# Patient Record
Sex: MALE | Race: WHITE | Employment: FULL TIME | URBAN - METROPOLITAN AREA
[De-identification: names, ages, dates, MRNs, and addresses within clinical notes are randomized per-mention and may not be internally consistent; named-entity substitution may affect disease eponyms.]

---

## 2017-01-03 RX ORDER — LORATADINE AND PSEUDOEPHEDRINE 10; 240 MG/1; MG/1
1 TABLET, EXTENDED RELEASE ORAL DAILY
Qty: 90 TABLET | Refills: 1 | Status: SHIPPED | OUTPATIENT
Start: 2017-01-03 | End: 2017-07-06 | Stop reason: SDUPTHER

## 2017-03-10 ENCOUNTER — OFFICE VISIT (OUTPATIENT)
Dept: FAMILY MEDICINE CLINIC | Age: 62
End: 2017-03-10

## 2017-03-10 VITALS
TEMPERATURE: 100.5 F | OXYGEN SATURATION: 99 % | WEIGHT: 181.25 LBS | BODY MASS INDEX: 29.13 KG/M2 | SYSTOLIC BLOOD PRESSURE: 131 MMHG | DIASTOLIC BLOOD PRESSURE: 79 MMHG | HEART RATE: 102 BPM

## 2017-03-10 DIAGNOSIS — R34 SCANTY URINE: ICD-10-CM

## 2017-03-10 DIAGNOSIS — N30.00 ACUTE CYSTITIS WITHOUT HEMATURIA: Primary | ICD-10-CM

## 2017-03-10 LAB
BILIRUBIN, POC: NEGATIVE
BLOOD URINE, POC: ABNORMAL
CLARITY, POC: ABNORMAL
COLOR, POC: YELLOW
GLUCOSE URINE, POC: NEGATIVE
KETONES, POC: NEGATIVE
LEUKOCYTE EST, POC: ABNORMAL
NITRITE, POC: POSITIVE
PH, POC: >=9
PROTEIN, POC: ABNORMAL
SPECIFIC GRAVITY, POC: 1.01
UROBILINOGEN, POC: 0.2

## 2017-03-10 PROCEDURE — 99213 OFFICE O/P EST LOW 20 MIN: CPT | Performed by: FAMILY MEDICINE

## 2017-03-10 PROCEDURE — 81003 URINALYSIS AUTO W/O SCOPE: CPT | Performed by: FAMILY MEDICINE

## 2017-03-10 RX ORDER — SULFAMETHOXAZOLE AND TRIMETHOPRIM 800; 160 MG/1; MG/1
1 TABLET ORAL 2 TIMES DAILY
Qty: 14 TABLET | Refills: 0 | Status: SHIPPED | OUTPATIENT
Start: 2017-03-10 | End: 2017-03-13 | Stop reason: CLARIF

## 2017-03-12 LAB
ORGANISM: ABNORMAL
URINE CULTURE, ROUTINE: ABNORMAL

## 2017-03-13 RX ORDER — CIPROFLOXACIN 250 MG/1
250 TABLET, FILM COATED ORAL 2 TIMES DAILY
Qty: 14 TABLET | Refills: 0 | Status: SHIPPED | OUTPATIENT
Start: 2017-03-13 | End: 2017-03-16 | Stop reason: ALTCHOICE

## 2017-03-16 ENCOUNTER — TELEPHONE (OUTPATIENT)
Dept: FAMILY MEDICINE CLINIC | Age: 62
End: 2017-03-16

## 2017-03-16 RX ORDER — CIPROFLOXACIN 500 MG/1
500 TABLET, FILM COATED ORAL 2 TIMES DAILY
Qty: 20 TABLET | Refills: 0 | Status: SHIPPED | OUTPATIENT
Start: 2017-03-16 | End: 2017-03-26

## 2017-03-30 ENCOUNTER — HOSPITAL ENCOUNTER (OUTPATIENT)
Dept: OTHER | Age: 62
Discharge: HOME | End: 2017-03-30
Attending: UROLOGY | Admitting: UROLOGY

## 2017-03-30 LAB — PROSTATE SPECIFIC ANTIGEN: <0.01 NG/ML (ref 0–4)

## 2017-04-28 ENCOUNTER — OFFICE VISIT (OUTPATIENT)
Dept: FAMILY MEDICINE CLINIC | Age: 62
End: 2017-04-28

## 2017-04-28 VITALS
HEART RATE: 88 BPM | TEMPERATURE: 97.9 F | WEIGHT: 186 LBS | HEIGHT: 66 IN | DIASTOLIC BLOOD PRESSURE: 80 MMHG | BODY MASS INDEX: 29.89 KG/M2 | OXYGEN SATURATION: 98 % | SYSTOLIC BLOOD PRESSURE: 139 MMHG

## 2017-04-28 DIAGNOSIS — R41.3 MEMORY DISTURBANCE: ICD-10-CM

## 2017-04-28 DIAGNOSIS — C61 PROSTATE CANCER (HCC): Primary | ICD-10-CM

## 2017-04-28 DIAGNOSIS — M19.90 ARTHRITIS: ICD-10-CM

## 2017-04-28 DIAGNOSIS — Z79.818 USE OF LEUPROLIDE ACETATE (LUPRON): ICD-10-CM

## 2017-04-28 DIAGNOSIS — L57.0 AK (ACTINIC KERATOSIS): ICD-10-CM

## 2017-04-28 DIAGNOSIS — Z87.828 HISTORY OF MENISCAL TEAR: ICD-10-CM

## 2017-04-28 PROBLEM — Z82.49 FAMILY HISTORY OF VALVULAR HEART DISEASE: Status: ACTIVE | Noted: 2017-04-28

## 2017-04-28 PROCEDURE — 99214 OFFICE O/P EST MOD 30 MIN: CPT | Performed by: FAMILY MEDICINE

## 2017-04-28 RX ORDER — DONEPEZIL HYDROCHLORIDE 5 MG/1
5 TABLET, FILM COATED ORAL NIGHTLY
Qty: 90 TABLET | Refills: 3 | Status: SHIPPED | OUTPATIENT
Start: 2017-04-28 | End: 2018-04-02 | Stop reason: SDUPTHER

## 2017-04-28 RX ORDER — MELOXICAM 15 MG/1
15 TABLET ORAL DAILY
Qty: 90 TABLET | Refills: 3 | Status: SHIPPED | OUTPATIENT
Start: 2017-04-28 | End: 2018-04-02 | Stop reason: SDUPTHER

## 2017-05-18 ENCOUNTER — OFFICE VISIT (OUTPATIENT)
Dept: FAMILY MEDICINE CLINIC | Age: 62
End: 2017-05-18

## 2017-05-18 VITALS
BODY MASS INDEX: 29.89 KG/M2 | SYSTOLIC BLOOD PRESSURE: 124 MMHG | WEIGHT: 186 LBS | TEMPERATURE: 98.3 F | OXYGEN SATURATION: 98 % | HEART RATE: 101 BPM | HEIGHT: 66 IN | DIASTOLIC BLOOD PRESSURE: 76 MMHG

## 2017-05-18 DIAGNOSIS — R39.15 URGENCY OF URINATION: Primary | ICD-10-CM

## 2017-05-18 LAB
BILIRUBIN, POC: NORMAL
BLOOD URINE, POC: NORMAL
CLARITY, POC: NORMAL
COLOR, POC: YELLOW
GLUCOSE URINE, POC: NORMAL
KETONES, POC: NORMAL
LEUKOCYTE EST, POC: NORMAL
NITRITE, POC: NORMAL
PH, POC: 7
PROTEIN, POC: 30
SPECIFIC GRAVITY, POC: 1.01
UROBILINOGEN, POC: 0.2

## 2017-05-18 PROCEDURE — 99213 OFFICE O/P EST LOW 20 MIN: CPT | Performed by: NURSE PRACTITIONER

## 2017-05-18 PROCEDURE — 81002 URINALYSIS NONAUTO W/O SCOPE: CPT | Performed by: NURSE PRACTITIONER

## 2017-05-18 RX ORDER — CIPROFLOXACIN 500 MG/1
500 TABLET, FILM COATED ORAL 2 TIMES DAILY
Qty: 20 TABLET | Refills: 0 | Status: SHIPPED | OUTPATIENT
Start: 2017-05-18 | End: 2017-05-28

## 2017-05-18 ASSESSMENT — ENCOUNTER SYMPTOMS
EYES NEGATIVE: 1
GASTROINTESTINAL NEGATIVE: 1
RESPIRATORY NEGATIVE: 1
BACK PAIN: 1

## 2017-05-20 LAB
ORGANISM: ABNORMAL
URINE CULTURE, ROUTINE: ABNORMAL

## 2017-05-22 ENCOUNTER — EMPLOYEE WELLNESS (OUTPATIENT)
Dept: OTHER | Age: 62
End: 2017-05-22

## 2017-05-22 LAB
CHOLESTEROL, TOTAL: 178 MG/DL (ref 0–199)
GLUCOSE BLD-MCNC: 100 MG/DL (ref 70–99)
HDLC SERPL-MCNC: 12 MG/DL (ref 40–60)
LDL CHOLESTEROL CALCULATED: 136 MG/DL
TRIGL SERPL-MCNC: 150 MG/DL (ref 0–150)

## 2017-07-06 ENCOUNTER — OFFICE VISIT (OUTPATIENT)
Dept: FAMILY MEDICINE CLINIC | Age: 62
End: 2017-07-06

## 2017-07-06 VITALS
HEART RATE: 76 BPM | DIASTOLIC BLOOD PRESSURE: 82 MMHG | HEIGHT: 66 IN | BODY MASS INDEX: 28.22 KG/M2 | WEIGHT: 175.6 LBS | SYSTOLIC BLOOD PRESSURE: 136 MMHG | OXYGEN SATURATION: 98 %

## 2017-07-06 DIAGNOSIS — C61 PROSTATE CANCER (HCC): Primary | ICD-10-CM

## 2017-07-06 DIAGNOSIS — E78.89 LIPIDS ABNORMAL: ICD-10-CM

## 2017-07-06 DIAGNOSIS — M19.90 ARTHRITIS: ICD-10-CM

## 2017-07-06 DIAGNOSIS — R41.3 MEMORY DISTURBANCE: ICD-10-CM

## 2017-07-06 DIAGNOSIS — Z11.59 NEED FOR HEPATITIS C SCREENING TEST: ICD-10-CM

## 2017-07-06 DIAGNOSIS — J30.1 SEASONAL ALLERGIC RHINITIS DUE TO POLLEN: ICD-10-CM

## 2017-07-06 PROCEDURE — 99214 OFFICE O/P EST MOD 30 MIN: CPT | Performed by: FAMILY MEDICINE

## 2017-07-06 RX ORDER — LORATADINE AND PSEUDOEPHEDRINE 10; 240 MG/1; MG/1
1 TABLET, EXTENDED RELEASE ORAL DAILY
Qty: 90 TABLET | Refills: 3 | Status: SHIPPED | OUTPATIENT
Start: 2017-07-06 | End: 2018-07-05 | Stop reason: SDUPTHER

## 2017-07-06 ASSESSMENT — ENCOUNTER SYMPTOMS
CONSTIPATION: 0
DIARRHEA: 0
BLOOD IN STOOL: 0
SHORTNESS OF BREATH: 0

## 2017-08-03 ENCOUNTER — HOSPITAL ENCOUNTER (OUTPATIENT)
Dept: OTHER | Age: 62
Discharge: HOME | End: 2017-08-03
Attending: FAMILY MEDICINE | Admitting: FAMILY MEDICINE

## 2017-08-03 ENCOUNTER — HOSPITAL ENCOUNTER (OUTPATIENT)
Dept: OTHER | Age: 62
Discharge: HOME | End: 2017-08-03
Attending: UROLOGY | Admitting: UROLOGY

## 2017-08-03 LAB
A/G RATIO: 1.4 (ref 1.1–2.2)
ALBUMIN SERPL-MCNC: 4 G/DL (ref 3.4–5)
ALP BLD-CCNC: 98 U/L (ref 40–129)
ALT SERPL-CCNC: 20 U/L (ref 10–40)
ANION GAP SERPL CALCULATED.3IONS-SCNC: 10 MMOL/L (ref 3–16)
AST SERPL-CCNC: 23 U/L (ref 15–37)
BILIRUB SERPL-MCNC: 0.4 MG/DL (ref 0–1)
BUN BLDV-MCNC: 19 MG/DL (ref 7–20)
CALCIUM SERPL-MCNC: 10 MG/DL (ref 8.3–10.6)
CHLORIDE BLD-SCNC: 105 MMOL/L (ref 99–110)
CHOLESTEROL, FASTING: 189 MG/DL (ref 0–199)
CO2: 26 MMOL/L (ref 21–32)
CREAT SERPL-MCNC: 0.8 MG/DL (ref 0.8–1.3)
GFR AFRICAN AMERICAN: >60
GFR NON-AFRICAN AMERICAN: >60
GLOBULIN: 2.8 G/DL
GLUCOSE FASTING: 118 MG/DL (ref 70–99)
HDLC SERPL-MCNC: 67 MG/DL (ref 40–60)
HEPATITIS C ANTIBODY INTERPRETATION: NORMAL
LDL CHOLESTEROL CALCULATED: 103 MG/DL
POTASSIUM SERPL-SCNC: 4.9 MMOL/L (ref 3.5–5.1)
PROSTATE SPECIFIC ANTIGEN: <0.01 NG/ML (ref 0–4)
SODIUM BLD-SCNC: 141 MMOL/L (ref 136–145)
TOTAL PROTEIN: 6.8 G/DL (ref 6.4–8.2)
TRIGLYCERIDE, FASTING: 94 MG/DL (ref 0–150)
VLDLC SERPL CALC-MCNC: 19 MG/DL

## 2017-11-25 ENCOUNTER — HOSPITAL ENCOUNTER (OUTPATIENT)
Dept: OTHER | Age: 62
Discharge: HOME | End: 2017-11-25
Attending: UROLOGY | Admitting: UROLOGY

## 2017-11-26 LAB — PROSTATE SPECIFIC ANTIGEN: <0.01 NG/ML (ref 0–4)

## 2017-11-28 ENCOUNTER — HOSPITAL ENCOUNTER (OUTPATIENT)
Dept: GENERAL RADIOLOGY | Age: 62
Discharge: HOME | End: 2017-11-28
Attending: UROLOGY | Admitting: UROLOGY

## 2017-11-28 DIAGNOSIS — C61 MALIGNANT NEOPLASM OF PROSTATE (HCC): ICD-10-CM

## 2017-11-28 DIAGNOSIS — C61 PROSTATE CANCER (HCC): ICD-10-CM

## 2018-01-15 ENCOUNTER — OFFICE VISIT (OUTPATIENT)
Dept: DERMATOLOGY | Age: 63
End: 2018-01-15

## 2018-01-15 DIAGNOSIS — D48.5 NEOPLASM OF UNCERTAIN BEHAVIOR OF SKIN: ICD-10-CM

## 2018-01-15 DIAGNOSIS — Z12.83 SCREENING EXAM FOR SKIN CANCER: ICD-10-CM

## 2018-01-15 DIAGNOSIS — L57.0 ACTINIC KERATOSES: Primary | ICD-10-CM

## 2018-01-15 PROCEDURE — 99213 OFFICE O/P EST LOW 20 MIN: CPT | Performed by: DERMATOLOGY

## 2018-01-15 PROCEDURE — 17000 DESTRUCT PREMALG LESION: CPT | Performed by: DERMATOLOGY

## 2018-01-15 PROCEDURE — 11100 PR BIOPSY OF SKIN LESION: CPT | Performed by: DERMATOLOGY

## 2018-01-15 PROCEDURE — 17003 DESTRUCT PREMALG LES 2-14: CPT | Performed by: DERMATOLOGY

## 2018-01-15 NOTE — PROGRESS NOTES
3    meloxicam (MOBIC) 15 MG tablet Take 1 tablet by mouth daily 90 tablet 3    fluticasone (FLONASE) 50 MCG/ACT nasal spray 2 sprays bilaterally qd 3 Bottle 3    Calcium Carb-Cholecalciferol 600-500 MG-UNIT CAPS Take 1,000 Units by mouth daily      Ascorbic Acid (VITAMIN C) 500 MG tablet Take 1,000 mg by mouth daily      leuprolide (LUPRON) 11.25 MG injection Inject 11.25 mg into the muscle once      acetaminophen (TYLENOL) 500 MG tablet Take 1 tablet by mouth every 6 hours as needed for Pain 120 tablet      Social History: Jazmin Quiroga. Wife is RN at [employer]. Physical Examination     The following were examined and determined to be normal: Psych/Neuro, Scalp/hair, Conjunctivae/eyelids, Gums/teeth/lips, Neck, Breast/axilla/chest, Abdomen, Back, RUE, LUE, LLE, Nails/digits and Genitalia/groin/buttocks. The following were examined and determined to be abnormal: Head/face and RLE. -General: Well-appearing, NAD  1. L lateral cheek 2 - ill-defined irregularly-shaped roughly-scaling thin pink macules/papules   2. R mid anterior lower leg - 1.3cm focally eroded dark pink patch       Assessment and Plan     1. Actinic keratosis(es)  -Edu re: relationship with chronic cumulative sun exposure, low premalignant potential.   -2 lesion(s) treated w/ liquid nitrogen x 2 cycles - L cheek. Edu re: risk of blister formation, discomfort, scar, hypopigmentation. Discussed wound care. -Reviewed sun protective behavior -- sun avoidance during the peak hours of the day, sun-protective clothing (including hat and sunglasses), sunscreen use (water resistant, broad spectrum, SPF at least 30, need for reapplication every 2 to 3 hours). -Return for full skin exam in 1 year (sooner if indicated)         2. Neoplasm of uncertain behavior of skin - R/o BCC, R lower leg  -Discussed possible diagnosis. Patient agreeable to biopsy.  Verbal consent obtained after risks (infection, bleeding, scar), benefits and alternatives explained. -Area(s) to be biopsied were marked with a surgical pen. Site(s) were cleansed with alcohol. Local anesthesia achieved with 1% lidocaine with epinephrine/sodium bicarbonate. Shave biopsy performed with a razor blade. Hemostasis was achieved with aluminum chloride. The wound(s) were dressed with petrolatum and covered with a bandage. Specimen(s) sent to pathology. Pt educated re: risk of bleeding, infection, scar and wound care instructions.

## 2018-01-19 ENCOUNTER — TELEPHONE (OUTPATIENT)
Dept: DERMATOLOGY | Age: 63
End: 2018-01-19

## 2018-02-22 ENCOUNTER — PROCEDURE VISIT (OUTPATIENT)
Dept: DERMATOLOGY | Age: 63
End: 2018-02-22

## 2018-02-22 DIAGNOSIS — L57.0 ACTINIC KERATOSES: Primary | ICD-10-CM

## 2018-02-22 PROCEDURE — 17000 DESTRUCT PREMALG LESION: CPT | Performed by: DERMATOLOGY

## 2018-02-22 NOTE — PROGRESS NOTES
[facility]) Dermatology  Daphne Kaur MD  [phone]      Vamsi Gardner  1955    57 y.o. male     Date of Visit: 2/22/2018    Last Visit: 1mo    Chief Complaint: AK    History of Present Illness:  1. Here for treatment of biopsy-confirmed AK of R mid anterior lower leg. No concerns since biopsy. Review of Systems: None    Past Medical History, Surgical History, Medications and Allergies reviewed.      Past Medical History:   Diagnosis Date    Actinic keratoses     Allergic rhinitis     Arthritis     Environmental allergies     Prostate cancer ([facility] [address]     PROSTATE     Past Surgical History:   Procedure Laterality Date    COLONOSCOPY  2-16-16    KNEE ARTHROSCOPY Left 4/26/16    partial medial and lateral meniscectomy, chondroplasty    KNEE SURGERY Right 10/15/13    RT KNEE PARTIAL MEDIAL, LATERAL MENISECTOMY AND CHONDROPLASTY WITH REMOVAL OF LOSSE HARDWARE    KNEE SURGERY Right 10/15/13    RT KNEE ARTHROSCOPY PARTIAL LATERAL AND MEDIAL MENISECTOMY WITH CHONDROPLASTY AND REMOVAL OF LOOSE BODIES    PROSTATECTOMY  07/17/15    DaVinci prostatectomy    SHOULDER SURGERY         Allergies   Allergen Reactions    Cephalexin Hives     Outpatient Prescriptions Marked as Taking for the 2/22/18 encounter (Procedure visit) with Daphne Kaur MD   Medication Sig Dispense Refill    vitamin D 1000 units CAPS Take by mouth      aspirin 81 MG tablet Take 81 mg by mouth daily      Sennosides (SENNA LAXATIVE PO) Take by mouth      MAGNESIUM PO Take by mouth      loratadine-pseudoephedrine (CLARITIN-D 24 HOUR)  MG per extended release tablet Take 1 tablet by mouth daily 90 tablet 3    donepezil (ARICEPT) 5 MG tablet Take 1 tablet by mouth nightly 90 tablet 3    meloxicam (MOBIC) 15 MG tablet Take 1 tablet by mouth daily 90 tablet 3    fluticasone (FLONASE) 50 MCG/ACT nasal spray 2 sprays bilaterally qd 3 Bottle 3    Calcium Carb-Cholecalciferol 600-500 MG-UNIT CAPS Take 1,000 Units by mouth daily      Ascorbic Acid (VITAMIN C) 500 MG tablet Take 1,000 mg by mouth daily      leuprolide (LUPRON) 11.25 MG injection Inject 11.25 mg into the muscle once      acetaminophen (TYLENOL) 500 MG tablet Take 1 tablet by mouth every 6 hours as needed for Pain 120 tablet        Physical Examination     The following were examined and determined to be normal: Psych/Neuro. The following were examined and determined to be abnormal: RLE.     -General: Well-appearing, NAD  1. R mid anterior lower leg 1 - irregularly-shaped roughly-scaling thin pink papule     Assessment and Plan     1. Actinic keratosis(es)  -Edu re: relationship with chronic cumulative sun exposure, low premalignant potential.   -1 lesion(s) treated w/ liquid nitrogen x 2 cycles - R lower leg. Edu re: risk of blister formation, discomfort, scar, hypopigmentation. Discussed wound care.

## 2018-02-26 ENCOUNTER — OFFICE VISIT (OUTPATIENT)
Dept: FAMILY MEDICINE CLINIC | Age: 63
End: 2018-02-26

## 2018-02-26 VITALS
OXYGEN SATURATION: 97 % | SYSTOLIC BLOOD PRESSURE: 140 MMHG | WEIGHT: 185.2 LBS | HEART RATE: 89 BPM | DIASTOLIC BLOOD PRESSURE: 82 MMHG | TEMPERATURE: 97.9 F | HEIGHT: 66 IN | BODY MASS INDEX: 29.77 KG/M2

## 2018-02-26 DIAGNOSIS — N39.0 URINARY TRACT INFECTION WITHOUT HEMATURIA, SITE UNSPECIFIED: Primary | ICD-10-CM

## 2018-02-26 DIAGNOSIS — L08.9 PUSTULE: ICD-10-CM

## 2018-02-26 DIAGNOSIS — R39.15 URINARY URGENCY: ICD-10-CM

## 2018-02-26 DIAGNOSIS — R50.9 FEVER, UNSPECIFIED FEVER CAUSE: ICD-10-CM

## 2018-02-26 LAB
BILIRUBIN, POC: NORMAL
BLOOD URINE, POC: NORMAL
CLARITY, POC: NORMAL
COLOR, POC: YELLOW
GLUCOSE URINE, POC: NORMAL
KETONES, POC: NORMAL
LEUKOCYTE EST, POC: NORMAL
NITRITE, POC: NORMAL
PH, POC: 6.5
PROTEIN, POC: 30
SPECIFIC GRAVITY, POC: 1.01
UROBILINOGEN, POC: 0.2

## 2018-02-26 PROCEDURE — 99214 OFFICE O/P EST MOD 30 MIN: CPT | Performed by: FAMILY MEDICINE

## 2018-02-26 PROCEDURE — 81002 URINALYSIS NONAUTO W/O SCOPE: CPT | Performed by: FAMILY MEDICINE

## 2018-02-26 RX ORDER — CIPROFLOXACIN 500 MG/1
500 TABLET, FILM COATED ORAL 2 TIMES DAILY
Qty: 20 TABLET | Refills: 0 | Status: SHIPPED | OUTPATIENT
Start: 2018-02-26 | End: 2018-03-08

## 2018-02-26 ASSESSMENT — PATIENT HEALTH QUESTIONNAIRE - PHQ9
2. FEELING DOWN, DEPRESSED OR HOPELESS: 0
SUM OF ALL RESPONSES TO PHQ9 QUESTIONS 1 & 2: 0
1. LITTLE INTEREST OR PLEASURE IN DOING THINGS: 0
SUM OF ALL RESPONSES TO PHQ QUESTIONS 1-9: 0

## 2018-02-26 ASSESSMENT — ENCOUNTER SYMPTOMS: ABDOMINAL PAIN: 0

## 2018-02-28 LAB
ORGANISM: ABNORMAL
URINE CULTURE, ROUTINE: ABNORMAL
URINE CULTURE, ROUTINE: ABNORMAL

## 2018-03-20 ENCOUNTER — TELEPHONE (OUTPATIENT)
Dept: FAMILY MEDICINE CLINIC | Age: 63
End: 2018-03-20

## 2018-03-20 VITALS — BODY MASS INDEX: 29.83 KG/M2 | WEIGHT: 182 LBS

## 2018-03-20 DIAGNOSIS — R73.09 ABNORMAL GLUCOSE: Primary | ICD-10-CM

## 2018-03-30 ENCOUNTER — HOSPITAL ENCOUNTER (OUTPATIENT)
Dept: OTHER | Age: 63
Discharge: HOME | End: 2018-03-30
Attending: UROLOGY | Admitting: UROLOGY

## 2018-03-31 LAB — PROSTATE SPECIFIC ANTIGEN: <0.01 NG/ML (ref 0–4)

## 2018-04-02 ENCOUNTER — OFFICE VISIT (OUTPATIENT)
Dept: FAMILY MEDICINE CLINIC | Age: 63
End: 2018-04-02

## 2018-04-02 VITALS
DIASTOLIC BLOOD PRESSURE: 86 MMHG | HEIGHT: 66 IN | WEIGHT: 184.4 LBS | SYSTOLIC BLOOD PRESSURE: 140 MMHG | HEART RATE: 74 BPM | OXYGEN SATURATION: 97 % | BODY MASS INDEX: 29.63 KG/M2

## 2018-04-02 DIAGNOSIS — Z00.00 WELL ADULT EXAM: Primary | ICD-10-CM

## 2018-04-02 DIAGNOSIS — R73.09 ABNORMAL GLUCOSE: ICD-10-CM

## 2018-04-02 DIAGNOSIS — J30.1 CHRONIC SEASONAL ALLERGIC RHINITIS DUE TO POLLEN: ICD-10-CM

## 2018-04-02 DIAGNOSIS — R41.3 MEMORY DISTURBANCE: ICD-10-CM

## 2018-04-02 DIAGNOSIS — C61 PROSTATE CANCER (HCC): ICD-10-CM

## 2018-04-02 DIAGNOSIS — M19.90 ARTHRITIS: ICD-10-CM

## 2018-04-02 LAB
A/G RATIO: 2.3 (ref 1.1–2.2)
ALBUMIN SERPL-MCNC: 4.5 G/DL (ref 3.4–5)
ALP BLD-CCNC: 94 U/L (ref 40–129)
ALT SERPL-CCNC: 17 U/L (ref 10–40)
ANION GAP SERPL CALCULATED.3IONS-SCNC: 13 MMOL/L (ref 3–16)
AST SERPL-CCNC: 20 U/L (ref 15–37)
BILIRUB SERPL-MCNC: 0.3 MG/DL (ref 0–1)
BUN BLDV-MCNC: 21 MG/DL (ref 7–20)
CALCIUM SERPL-MCNC: 9.6 MG/DL (ref 8.3–10.6)
CHLORIDE BLD-SCNC: 102 MMOL/L (ref 99–110)
CHOLESTEROL, TOTAL: 200 MG/DL (ref 0–199)
CO2: 27 MMOL/L (ref 21–32)
CREAT SERPL-MCNC: 0.8 MG/DL (ref 0.8–1.3)
GFR AFRICAN AMERICAN: >60
GFR NON-AFRICAN AMERICAN: >60
GLOBULIN: 2 G/DL
GLUCOSE BLD-MCNC: 117 MG/DL (ref 70–99)
HDLC SERPL-MCNC: 69 MG/DL (ref 40–60)
LDL CHOLESTEROL CALCULATED: 108 MG/DL
POTASSIUM SERPL-SCNC: 4.7 MMOL/L (ref 3.5–5.1)
SODIUM BLD-SCNC: 142 MMOL/L (ref 136–145)
TOTAL PROTEIN: 6.5 G/DL (ref 6.4–8.2)
TRIGL SERPL-MCNC: 116 MG/DL (ref 0–150)
VLDLC SERPL CALC-MCNC: 23 MG/DL

## 2018-04-02 PROCEDURE — 99396 PREV VISIT EST AGE 40-64: CPT | Performed by: FAMILY MEDICINE

## 2018-04-02 RX ORDER — DONEPEZIL HYDROCHLORIDE 5 MG/1
5 TABLET, FILM COATED ORAL NIGHTLY
Qty: 90 TABLET | Refills: 3 | Status: SHIPPED | OUTPATIENT
Start: 2018-04-02 | End: 2018-08-20 | Stop reason: SDUPTHER

## 2018-04-02 RX ORDER — FLUTICASONE PROPIONATE 50 MCG
SPRAY, SUSPENSION (ML) NASAL
Qty: 3 BOTTLE | Refills: 3 | Status: SHIPPED | OUTPATIENT
Start: 2018-04-02

## 2018-04-02 RX ORDER — MELOXICAM 15 MG/1
15 TABLET ORAL DAILY
Qty: 90 TABLET | Refills: 3 | Status: SHIPPED | OUTPATIENT
Start: 2018-04-02 | End: 2018-08-20 | Stop reason: SDUPTHER

## 2018-04-02 ASSESSMENT — ENCOUNTER SYMPTOMS
DIARRHEA: 0
SHORTNESS OF BREATH: 0
NAUSEA: 0
CONSTIPATION: 0
CHEST TIGHTNESS: 0

## 2018-04-03 ENCOUNTER — TELEPHONE (OUTPATIENT)
Dept: FAMILY MEDICINE CLINIC | Age: 63
End: 2018-04-03

## 2018-04-03 LAB
ESTIMATED AVERAGE GLUCOSE: 122.6 MG/DL
HBA1C MFR BLD: 5.9 %

## 2018-04-09 ENCOUNTER — PATIENT MESSAGE (OUTPATIENT)
Dept: FAMILY MEDICINE CLINIC | Age: 63
End: 2018-04-09

## 2018-04-09 DIAGNOSIS — Z86.73 PERSONAL HISTORY OF TIA (TRANSIENT ISCHEMIC ATTACK): Primary | ICD-10-CM

## 2018-04-19 ENCOUNTER — HOSPITAL ENCOUNTER (OUTPATIENT)
Dept: VASCULAR LAB | Age: 63
Discharge: HOME | End: 2018-04-19
Attending: FAMILY MEDICINE | Admitting: FAMILY MEDICINE

## 2018-04-19 DIAGNOSIS — Z86.73 PERSONAL HISTORY OF TRANSIENT ISCHEMIC ATTACK (TIA), AND CEREBRAL INFARCTION WITHOUT RESIDUAL DEFICITS: ICD-10-CM

## 2018-05-11 ENCOUNTER — HOSPITAL ENCOUNTER (OUTPATIENT)
Dept: NON INVASIVE DIAGNOSTICS | Age: 63
Discharge: HOME | End: 2018-05-11
Attending: FAMILY MEDICINE | Admitting: FAMILY MEDICINE

## 2018-05-11 DIAGNOSIS — Z86.73 PERSONAL HISTORY OF TRANSIENT ISCHEMIC ATTACK (TIA), AND CEREBRAL INFARCTION WITHOUT RESIDUAL DEFICITS: ICD-10-CM

## 2018-05-11 LAB
LV EF: 58 %
LVEF MODALITY: NORMAL

## 2018-05-14 ENCOUNTER — OFFICE VISIT (OUTPATIENT)
Dept: FAMILY MEDICINE CLINIC | Age: 63
End: 2018-05-14

## 2018-05-14 VITALS
DIASTOLIC BLOOD PRESSURE: 68 MMHG | SYSTOLIC BLOOD PRESSURE: 120 MMHG | OXYGEN SATURATION: 98 % | HEART RATE: 74 BPM | WEIGHT: 180.8 LBS | HEIGHT: 66 IN | BODY MASS INDEX: 29.06 KG/M2

## 2018-05-14 DIAGNOSIS — N39.0 RECURRENT UTI: ICD-10-CM

## 2018-05-14 DIAGNOSIS — Z01.818 PRE-OP EXAM: Primary | ICD-10-CM

## 2018-05-14 PROCEDURE — G8427 DOCREV CUR MEDS BY ELIG CLIN: HCPCS | Performed by: FAMILY MEDICINE

## 2018-05-14 PROCEDURE — 99213 OFFICE O/P EST LOW 20 MIN: CPT | Performed by: FAMILY MEDICINE

## 2018-05-14 PROCEDURE — 1036F TOBACCO NON-USER: CPT | Performed by: FAMILY MEDICINE

## 2018-05-14 PROCEDURE — 1123F ACP DISCUSS/DSCN MKR DOCD: CPT | Performed by: FAMILY MEDICINE

## 2018-05-14 PROCEDURE — G8417 CALC BMI ABV UP PARAM F/U: HCPCS | Performed by: FAMILY MEDICINE

## 2018-05-14 PROCEDURE — 3017F COLORECTAL CA SCREEN DOC REV: CPT | Performed by: FAMILY MEDICINE

## 2018-05-14 PROCEDURE — 93000 ELECTROCARDIOGRAM COMPLETE: CPT | Performed by: FAMILY MEDICINE

## 2018-05-14 PROCEDURE — 4040F PNEUMOC VAC/ADMIN/RCVD: CPT | Performed by: FAMILY MEDICINE

## 2018-05-14 RX ORDER — CIPROFLOXACIN 500 MG/1
500 TABLET, FILM COATED ORAL 2 TIMES DAILY
COMMUNITY
End: 2019-01-14 | Stop reason: ALTCHOICE

## 2018-05-14 ASSESSMENT — ENCOUNTER SYMPTOMS
NAUSEA: 0
BLOOD IN STOOL: 0
CONSTIPATION: 0
SHORTNESS OF BREATH: 0
CHEST TIGHTNESS: 0
DIARRHEA: 0

## 2018-05-23 ENCOUNTER — HOSPITAL ENCOUNTER (OUTPATIENT)
Dept: OTHER | Age: 63
Discharge: HOME | End: 2018-05-23
Attending: EMERGENCY MEDICINE | Admitting: EMERGENCY MEDICINE

## 2018-05-23 DIAGNOSIS — N20.0 CALCULUS OF KIDNEY: ICD-10-CM

## 2018-07-05 DIAGNOSIS — J30.1 SEASONAL ALLERGIC RHINITIS DUE TO POLLEN: ICD-10-CM

## 2018-07-05 RX ORDER — LORATADINE AND PSEUDOEPHEDRINE SULFATE 10; 240 MG/1; MG/1
TABLET, FILM COATED, EXTENDED RELEASE ORAL
Qty: 90 TABLET | Refills: 3 | Status: SHIPPED | OUTPATIENT
Start: 2018-07-05 | End: 2019-07-03 | Stop reason: SDUPTHER

## 2018-08-01 ENCOUNTER — HOSPITAL ENCOUNTER (OUTPATIENT)
Age: 63
Discharge: HOME | End: 2018-08-01
Payer: MEDICARE

## 2018-08-01 PROCEDURE — 36415 COLL VENOUS BLD VENIPUNCTURE: CPT

## 2018-08-01 PROCEDURE — 84153 ASSAY OF PSA TOTAL: CPT

## 2018-08-02 LAB — PROSTATE SPECIFIC ANTIGEN: <0.01 NG/ML (ref 0–4)

## 2018-08-06 ENCOUNTER — OFFICE VISIT (OUTPATIENT)
Dept: FAMILY MEDICINE CLINIC | Age: 63
End: 2018-08-06

## 2018-08-06 VITALS
BODY MASS INDEX: 30.32 KG/M2 | TEMPERATURE: 98 F | WEIGHT: 182 LBS | OXYGEN SATURATION: 98 % | HEIGHT: 65 IN | SYSTOLIC BLOOD PRESSURE: 136 MMHG | DIASTOLIC BLOOD PRESSURE: 82 MMHG | HEART RATE: 72 BPM

## 2018-08-06 DIAGNOSIS — Z01.818 PRE-OP EXAM: Primary | ICD-10-CM

## 2018-08-06 PROCEDURE — 4040F PNEUMOC VAC/ADMIN/RCVD: CPT | Performed by: NURSE PRACTITIONER

## 2018-08-06 PROCEDURE — G8427 DOCREV CUR MEDS BY ELIG CLIN: HCPCS | Performed by: NURSE PRACTITIONER

## 2018-08-06 PROCEDURE — 99213 OFFICE O/P EST LOW 20 MIN: CPT | Performed by: NURSE PRACTITIONER

## 2018-08-06 PROCEDURE — G8417 CALC BMI ABV UP PARAM F/U: HCPCS | Performed by: NURSE PRACTITIONER

## 2018-08-06 PROCEDURE — 1123F ACP DISCUSS/DSCN MKR DOCD: CPT | Performed by: NURSE PRACTITIONER

## 2018-08-06 PROCEDURE — 3017F COLORECTAL CA SCREEN DOC REV: CPT | Performed by: NURSE PRACTITIONER

## 2018-08-06 PROCEDURE — 1101F PT FALLS ASSESS-DOCD LE1/YR: CPT | Performed by: NURSE PRACTITIONER

## 2018-08-06 PROCEDURE — 93000 ELECTROCARDIOGRAM COMPLETE: CPT | Performed by: NURSE PRACTITIONER

## 2018-08-06 PROCEDURE — 1036F TOBACCO NON-USER: CPT | Performed by: NURSE PRACTITIONER

## 2018-08-06 ASSESSMENT — ENCOUNTER SYMPTOMS
RESPIRATORY NEGATIVE: 1
GASTROINTESTINAL NEGATIVE: 1
EYES NEGATIVE: 1

## 2018-08-06 NOTE — PROGRESS NOTES
Subjective:      Patient ID: Rachel Norman is a 70 y.o. male. HPI  Chief Complaint   Patient presents with   Bobo Macho     Cystoscopy Dr Regino Pack at [facility] 8/9/18     Here for a pre-op physical for a cystoscopy with  Stent placement in the Right kidney for a kidney stone. Currently feeling well, denies any current issues. Review of Systems   Constitutional: Negative. HENT: Negative. Eyes: Negative. Respiratory: Negative. Cardiovascular: Negative. Gastrointestinal: Negative. Endocrine: Negative. Genitourinary: Negative. See HPI   Musculoskeletal: Negative. Skin: Negative. Neurological: Negative. Hematological: Negative. Psychiatric/Behavioral: Negative. Objective:   Physical Exam   Constitutional: He is oriented to person, place, and time. He appears well-developed and well-nourished. No distress. HENT:   Head: Normocephalic and atraumatic. Right Ear: Hearing, tympanic membrane, external ear and ear canal normal.   Left Ear: Hearing, tympanic membrane, external ear and ear canal normal.   Nose: Nose normal.   Mouth/Throat: Oropharynx is clear and moist. No oropharyngeal exudate. Eyes: Conjunctivae and EOM are normal. Pupils are equal, round, and reactive to light. Right eye exhibits no discharge. Left eye exhibits no discharge. No scleral icterus. Fundoscopic exam:       The right eye shows no arteriolar narrowing, no AV nicking, no exudate, no hemorrhage and no papilledema. The left eye shows no arteriolar narrowing, no AV nicking, no exudate, no hemorrhage and no papilledema. Neck: Normal range of motion. Neck supple. Carotid bruit is not present. No thyromegaly present. Cardiovascular: Normal rate, regular rhythm, normal heart sounds and intact distal pulses. No murmur heard. Pulmonary/Chest: Effort normal and breath sounds normal. No respiratory distress. Abdominal: Soft.  Bowel sounds are normal. He exhibits no

## 2018-08-20 DIAGNOSIS — R41.3 MEMORY DISTURBANCE: ICD-10-CM

## 2018-08-20 DIAGNOSIS — M19.90 ARTHRITIS: ICD-10-CM

## 2018-08-20 RX ORDER — DONEPEZIL HYDROCHLORIDE 5 MG/1
5 TABLET, FILM COATED ORAL NIGHTLY
Qty: 90 TABLET | Refills: 3 | Status: SHIPPED | OUTPATIENT
Start: 2018-08-20 | End: 2019-08-26 | Stop reason: SDUPTHER

## 2018-08-20 RX ORDER — MELOXICAM 15 MG/1
15 TABLET ORAL DAILY
Qty: 90 TABLET | Refills: 3 | Status: SHIPPED | OUTPATIENT
Start: 2018-08-20 | End: 2019-08-26 | Stop reason: SDUPTHER

## 2018-09-12 ENCOUNTER — HOSPITAL ENCOUNTER (OUTPATIENT)
Age: 63
Discharge: HOME | End: 2018-09-12
Payer: MEDICARE

## 2018-09-12 ENCOUNTER — HOSPITAL ENCOUNTER (OUTPATIENT)
Dept: GENERAL RADIOLOGY | Age: 63
Discharge: HOME | End: 2018-09-12
Payer: MEDICARE

## 2018-09-12 DIAGNOSIS — N20.0 CALCULUS, RENAL: ICD-10-CM

## 2018-09-12 PROCEDURE — 74018 RADEX ABDOMEN 1 VIEW: CPT

## 2018-12-10 ENCOUNTER — HOSPITAL ENCOUNTER (OUTPATIENT)
Age: 63
Discharge: HOME | End: 2018-12-10
Payer: MEDICARE

## 2018-12-10 ENCOUNTER — HOSPITAL ENCOUNTER (OUTPATIENT)
Dept: GENERAL RADIOLOGY | Age: 63
Discharge: HOME | End: 2018-12-10
Payer: MEDICARE

## 2018-12-10 DIAGNOSIS — N20.0 URIC ACID NEPHROLITHIASIS: ICD-10-CM

## 2018-12-10 PROCEDURE — 36415 COLL VENOUS BLD VENIPUNCTURE: CPT

## 2018-12-10 PROCEDURE — 74018 RADEX ABDOMEN 1 VIEW: CPT

## 2018-12-10 PROCEDURE — 84153 ASSAY OF PSA TOTAL: CPT

## 2018-12-10 PROCEDURE — 84403 ASSAY OF TOTAL TESTOSTERONE: CPT

## 2018-12-11 LAB — PROSTATE SPECIFIC ANTIGEN: <0.01 NG/ML (ref 0–4)

## 2018-12-13 LAB — TESTOSTERONE TOTAL: <3 NG/DL (ref 220–1000)

## 2019-01-09 ENCOUNTER — OFFICE VISIT (OUTPATIENT)
Dept: FAMILY MEDICINE CLINIC | Age: 64
End: 2019-01-09
Payer: MEDICARE

## 2019-01-09 VITALS
WEIGHT: 187.2 LBS | HEIGHT: 66 IN | HEART RATE: 73 BPM | SYSTOLIC BLOOD PRESSURE: 130 MMHG | DIASTOLIC BLOOD PRESSURE: 80 MMHG | BODY MASS INDEX: 30.08 KG/M2 | OXYGEN SATURATION: 96 %

## 2019-01-09 DIAGNOSIS — M79.604 RIGHT LEG PAIN: Primary | ICD-10-CM
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
## 2019-01-09 DIAGNOSIS — I73.9 CLAUDICATION (HCC): ICD-10-CM

## 2019-01-09 LAB
A/G RATIO: 2 (ref 1.1–2.2)
ALBUMIN SERPL-MCNC: 4.5 G/DL (ref 3.4–5)
ALP BLD-CCNC: 102 U/L (ref 40–129)
ALT SERPL-CCNC: 13 U/L (ref 10–40)
ANION GAP SERPL CALCULATED.3IONS-SCNC: 15 MMOL/L (ref 3–16)
AST SERPL-CCNC: 18 U/L (ref 15–37)
BILIRUB SERPL-MCNC: 0.4 MG/DL (ref 0–1)
BUN BLDV-MCNC: 19 MG/DL (ref 7–20)
CALCIUM SERPL-MCNC: 9.7 MG/DL (ref 8.3–10.6)
CHLORIDE BLD-SCNC: 104 MMOL/L (ref 99–110)
CO2: 24 MMOL/L (ref 21–32)
CREAT SERPL-MCNC: 0.9 MG/DL (ref 0.8–1.3)
GFR AFRICAN AMERICAN: >60
GFR NON-AFRICAN AMERICAN: >60
GLOBULIN: 2.3 G/DL
GLUCOSE BLD-MCNC: 107 MG/DL (ref 70–99)
MAGNESIUM: 2.4 MG/DL (ref 1.8–2.4)
POTASSIUM SERPL-SCNC: 5.2 MMOL/L (ref 3.5–5.1)
SODIUM BLD-SCNC: 143 MMOL/L (ref 136–145)
TOTAL PROTEIN: 6.8 G/DL (ref 6.4–8.2)

## 2019-01-09 PROCEDURE — G8417 CALC BMI ABV UP PARAM F/U: HCPCS | Performed by: FAMILY MEDICINE

## 2019-01-09 PROCEDURE — 36415 COLL VENOUS BLD VENIPUNCTURE: CPT | Performed by: FAMILY MEDICINE

## 2019-01-09 PROCEDURE — G8427 DOCREV CUR MEDS BY ELIG CLIN: HCPCS | Performed by: FAMILY MEDICINE

## 2019-01-09 PROCEDURE — 4040F PNEUMOC VAC/ADMIN/RCVD: CPT | Performed by: FAMILY MEDICINE

## 2019-01-09 PROCEDURE — G8482 FLU IMMUNIZE ORDER/ADMIN: HCPCS | Performed by: FAMILY MEDICINE

## 2019-01-09 PROCEDURE — 99214 OFFICE O/P EST MOD 30 MIN: CPT | Performed by: FAMILY MEDICINE

## 2019-01-09 PROCEDURE — 1123F ACP DISCUSS/DSCN MKR DOCD: CPT | Performed by: FAMILY MEDICINE

## 2019-01-09 PROCEDURE — 1036F TOBACCO NON-USER: CPT | Performed by: FAMILY MEDICINE

## 2019-01-09 PROCEDURE — 1101F PT FALLS ASSESS-DOCD LE1/YR: CPT | Performed by: FAMILY MEDICINE

## 2019-01-09 PROCEDURE — 3017F COLORECTAL CA SCREEN DOC REV: CPT | Performed by: FAMILY MEDICINE

## 2019-01-09 ASSESSMENT — ENCOUNTER SYMPTOMS
BLOOD IN STOOL: 0
CONSTIPATION: 0
CHEST TIGHTNESS: 0
SHORTNESS OF BREATH: 0
DIARRHEA: 0

## 2019-01-11 LAB — LYME, EIA: 0.22 LIV (ref 0–1.2)

## 2019-01-14 ENCOUNTER — OFFICE VISIT (OUTPATIENT)
Dept: DERMATOLOGY | Age: 64
End: 2019-01-14
Payer: MEDICARE

## 2019-01-14 DIAGNOSIS — M79.604 PAIN IN BOTH LOWER EXTREMITIES: Primary | ICD-10-CM

## 2019-01-14 DIAGNOSIS — Z12.83 SCREENING EXAM FOR SKIN CANCER: ICD-10-CM

## 2019-01-14 DIAGNOSIS — L57.0 ACTINIC KERATOSES: Primary | ICD-10-CM

## 2019-01-14 DIAGNOSIS — L90.5 SCAR: ICD-10-CM

## 2019-01-14 DIAGNOSIS — I73.9 CLAUDICATION (HCC): ICD-10-CM

## 2019-01-14 DIAGNOSIS — M79.605 PAIN IN BOTH LOWER EXTREMITIES: Primary | ICD-10-CM

## 2019-01-14 PROCEDURE — 99213 OFFICE O/P EST LOW 20 MIN: CPT | Performed by: DERMATOLOGY

## 2019-01-14 PROCEDURE — G8417 CALC BMI ABV UP PARAM F/U: HCPCS | Performed by: DERMATOLOGY

## 2019-01-14 PROCEDURE — 1036F TOBACCO NON-USER: CPT | Performed by: DERMATOLOGY

## 2019-01-14 PROCEDURE — G8482 FLU IMMUNIZE ORDER/ADMIN: HCPCS | Performed by: DERMATOLOGY

## 2019-01-14 PROCEDURE — 1123F ACP DISCUSS/DSCN MKR DOCD: CPT | Performed by: DERMATOLOGY

## 2019-01-14 PROCEDURE — 1101F PT FALLS ASSESS-DOCD LE1/YR: CPT | Performed by: DERMATOLOGY

## 2019-01-14 PROCEDURE — 3017F COLORECTAL CA SCREEN DOC REV: CPT | Performed by: DERMATOLOGY

## 2019-01-14 PROCEDURE — 4040F PNEUMOC VAC/ADMIN/RCVD: CPT | Performed by: DERMATOLOGY

## 2019-01-14 PROCEDURE — 17003 DESTRUCT PREMALG LES 2-14: CPT | Performed by: DERMATOLOGY

## 2019-01-14 PROCEDURE — G8427 DOCREV CUR MEDS BY ELIG CLIN: HCPCS | Performed by: DERMATOLOGY

## 2019-01-14 PROCEDURE — 17000 DESTRUCT PREMALG LESION: CPT | Performed by: DERMATOLOGY

## 2019-01-23 ENCOUNTER — HOSPITAL ENCOUNTER (OUTPATIENT)
Dept: VASCULAR LAB | Age: 64
Discharge: HOME | End: 2019-01-23
Payer: MEDICARE

## 2019-01-23 DIAGNOSIS — M79.604 PAIN IN BOTH LOWER EXTREMITIES: ICD-10-CM

## 2019-01-23 DIAGNOSIS — M79.605 PAIN IN BOTH LOWER EXTREMITIES: ICD-10-CM

## 2019-01-23 DIAGNOSIS — I73.9 CLAUDICATION (HCC): ICD-10-CM

## 2019-01-23 PROCEDURE — 93923 UPR/LXTR ART STDY 3+ LVLS: CPT

## 2019-01-31 ENCOUNTER — OFFICE VISIT (OUTPATIENT)
Dept: ORTHOPEDIC SURGERY | Age: 64
End: 2019-01-31
Payer: MEDICARE

## 2019-01-31 VITALS
WEIGHT: 187.17 LBS | BODY MASS INDEX: 30.08 KG/M2 | HEART RATE: 92 BPM | HEIGHT: 66 IN | DIASTOLIC BLOOD PRESSURE: 78 MMHG | SYSTOLIC BLOOD PRESSURE: 134 MMHG

## 2019-01-31 DIAGNOSIS — M54.16 LUMBAR RADICULITIS: ICD-10-CM

## 2019-01-31 DIAGNOSIS — M54.5 LOW BACK PAIN, UNSPECIFIED BACK PAIN LATERALITY, UNSPECIFIED CHRONICITY, WITH SCIATICA PRESENCE UNSPECIFIED: Primary | ICD-10-CM

## 2019-01-31 DIAGNOSIS — M51.36 DDD (DEGENERATIVE DISC DISEASE), LUMBAR: ICD-10-CM

## 2019-01-31 PROCEDURE — G8427 DOCREV CUR MEDS BY ELIG CLIN: HCPCS | Performed by: PHYSICAL MEDICINE & REHABILITATION

## 2019-01-31 PROCEDURE — G8482 FLU IMMUNIZE ORDER/ADMIN: HCPCS | Performed by: PHYSICAL MEDICINE & REHABILITATION

## 2019-01-31 PROCEDURE — 1101F PT FALLS ASSESS-DOCD LE1/YR: CPT | Performed by: PHYSICAL MEDICINE & REHABILITATION

## 2019-01-31 PROCEDURE — 1036F TOBACCO NON-USER: CPT | Performed by: PHYSICAL MEDICINE & REHABILITATION

## 2019-01-31 PROCEDURE — G8417 CALC BMI ABV UP PARAM F/U: HCPCS | Performed by: PHYSICAL MEDICINE & REHABILITATION

## 2019-01-31 PROCEDURE — 4040F PNEUMOC VAC/ADMIN/RCVD: CPT | Performed by: PHYSICAL MEDICINE & REHABILITATION

## 2019-01-31 PROCEDURE — 3017F COLORECTAL CA SCREEN DOC REV: CPT | Performed by: PHYSICAL MEDICINE & REHABILITATION

## 2019-01-31 PROCEDURE — 99203 OFFICE O/P NEW LOW 30 MIN: CPT | Performed by: PHYSICAL MEDICINE & REHABILITATION

## 2019-01-31 PROCEDURE — 1123F ACP DISCUSS/DSCN MKR DOCD: CPT | Performed by: PHYSICAL MEDICINE & REHABILITATION

## 2019-02-06 ENCOUNTER — HOSPITAL ENCOUNTER (OUTPATIENT)
Dept: MRI IMAGING | Age: 64
Discharge: HOME | End: 2019-02-06
Payer: MEDICARE

## 2019-02-06 DIAGNOSIS — M51.36 DDD (DEGENERATIVE DISC DISEASE), LUMBAR: ICD-10-CM

## 2019-02-06 DIAGNOSIS — M54.5 LOW BACK PAIN, UNSPECIFIED BACK PAIN LATERALITY, UNSPECIFIED CHRONICITY, WITH SCIATICA PRESENCE UNSPECIFIED: ICD-10-CM

## 2019-02-06 DIAGNOSIS — M54.16 LUMBAR RADICULITIS: ICD-10-CM

## 2019-02-06 PROCEDURE — 6360000004 HC RX CONTRAST MEDICATION: Performed by: PHYSICAL MEDICINE & REHABILITATION

## 2019-02-06 PROCEDURE — A9579 GAD-BASE MR CONTRAST NOS,1ML: HCPCS | Performed by: PHYSICAL MEDICINE & REHABILITATION

## 2019-02-06 PROCEDURE — 72158 MRI LUMBAR SPINE W/O & W/DYE: CPT

## 2019-02-06 RX ADMIN — GADOTERIDOL 17 ML: 279.3 INJECTION, SOLUTION INTRAVENOUS at 09:54

## 2019-02-14 ENCOUNTER — OFFICE VISIT (OUTPATIENT)
Dept: ORTHOPEDIC SURGERY | Age: 64
End: 2019-02-14

## 2019-02-14 VITALS — HEIGHT: 66 IN | BODY MASS INDEX: 30.08 KG/M2 | WEIGHT: 187.17 LBS

## 2019-02-14 DIAGNOSIS — M48.062 SPINAL STENOSIS, LUMBAR REGION, WITH NEUROGENIC CLAUDICATION: Primary | ICD-10-CM

## 2019-02-15 ENCOUNTER — TELEPHONE (OUTPATIENT)
Dept: ORTHOPEDIC SURGERY | Age: 64
End: 2019-02-15

## 2019-02-26 ENCOUNTER — HOSPITAL ENCOUNTER (OUTPATIENT)
Age: 64
Setting detail: OUTPATIENT SURGERY
Discharge: HOME | End: 2019-02-26
Attending: PHYSICAL MEDICINE & REHABILITATION | Admitting: PHYSICAL MEDICINE & REHABILITATION
Payer: MEDICARE

## 2019-02-26 VITALS
HEIGHT: 65 IN | OXYGEN SATURATION: 96 % | BODY MASS INDEX: 31.16 KG/M2 | HEART RATE: 71 BPM | RESPIRATION RATE: 16 BRPM | SYSTOLIC BLOOD PRESSURE: 162 MMHG | DIASTOLIC BLOOD PRESSURE: 86 MMHG | TEMPERATURE: 97.6 F | WEIGHT: 187 LBS

## 2019-02-26 PROCEDURE — 2500000003 HC RX 250 WO HCPCS: Performed by: PHYSICAL MEDICINE & REHABILITATION

## 2019-02-26 PROCEDURE — 6360000002 HC RX W HCPCS: Performed by: PHYSICAL MEDICINE & REHABILITATION

## 2019-02-26 PROCEDURE — 7100000010 HC PHASE II RECOVERY - FIRST 15 MIN: Performed by: PHYSICAL MEDICINE & REHABILITATION

## 2019-02-26 PROCEDURE — 6360000004 HC RX CONTRAST MEDICATION: Performed by: PHYSICAL MEDICINE & REHABILITATION

## 2019-02-26 PROCEDURE — 2709999900 HC NON-CHARGEABLE SUPPLY: Performed by: PHYSICAL MEDICINE & REHABILITATION

## 2019-02-26 PROCEDURE — 7100000011 HC PHASE II RECOVERY - ADDTL 15 MIN: Performed by: PHYSICAL MEDICINE & REHABILITATION

## 2019-02-26 PROCEDURE — 3600000012 HC SURGERY LEVEL 2 ADDTL 15MIN: Performed by: PHYSICAL MEDICINE & REHABILITATION

## 2019-02-26 PROCEDURE — 3600000002 HC SURGERY LEVEL 2 BASE: Performed by: PHYSICAL MEDICINE & REHABILITATION

## 2019-02-26 RX ORDER — LIDOCAINE HYDROCHLORIDE 10 MG/ML
INJECTION, SOLUTION EPIDURAL; INFILTRATION; INTRACAUDAL; PERINEURAL PRN
Status: DISCONTINUED | OUTPATIENT
Start: 2019-02-26 | End: 2019-02-26 | Stop reason: ALTCHOICE

## 2019-02-26 ASSESSMENT — PAIN DESCRIPTION - DESCRIPTORS: DESCRIPTORS: SHARP;ACHING

## 2019-02-26 ASSESSMENT — PAIN DESCRIPTION - LOCATION: LOCATION: BACK

## 2019-02-26 ASSESSMENT — PAIN SCALES - GENERAL: PAINLEVEL_OUTOF10: 0

## 2019-02-26 ASSESSMENT — PAIN - FUNCTIONAL ASSESSMENT
PAIN_FUNCTIONAL_ASSESSMENT: 0-10
PAIN_FUNCTIONAL_ASSESSMENT: PREVENTS OR INTERFERES SOME ACTIVE ACTIVITIES AND ADLS

## 2019-02-26 ASSESSMENT — PAIN DESCRIPTION - PAIN TYPE: TYPE: CHRONIC PAIN

## 2019-03-12 ENCOUNTER — OFFICE VISIT (OUTPATIENT)
Dept: ORTHOPEDIC SURGERY | Age: 64
End: 2019-03-12
Payer: MEDICARE

## 2019-03-12 VITALS
SYSTOLIC BLOOD PRESSURE: 161 MMHG | HEIGHT: 65 IN | WEIGHT: 186.95 LBS | BODY MASS INDEX: 31.15 KG/M2 | HEART RATE: 70 BPM | DIASTOLIC BLOOD PRESSURE: 91 MMHG

## 2019-03-12 DIAGNOSIS — M48.061 SPINAL STENOSIS OF LUMBAR REGION, UNSPECIFIED WHETHER NEUROGENIC CLAUDICATION PRESENT: Primary | ICD-10-CM

## 2019-03-12 DIAGNOSIS — M54.16 LUMBAR RADICULITIS: ICD-10-CM

## 2019-03-12 DIAGNOSIS — M51.36 DDD (DEGENERATIVE DISC DISEASE), LUMBAR: ICD-10-CM

## 2019-03-12 PROCEDURE — 1036F TOBACCO NON-USER: CPT | Performed by: PHYSICIAN ASSISTANT

## 2019-03-12 PROCEDURE — G8417 CALC BMI ABV UP PARAM F/U: HCPCS | Performed by: PHYSICIAN ASSISTANT

## 2019-03-12 PROCEDURE — 99213 OFFICE O/P EST LOW 20 MIN: CPT | Performed by: PHYSICIAN ASSISTANT

## 2019-03-12 PROCEDURE — 1101F PT FALLS ASSESS-DOCD LE1/YR: CPT | Performed by: PHYSICIAN ASSISTANT

## 2019-03-12 PROCEDURE — 3017F COLORECTAL CA SCREEN DOC REV: CPT | Performed by: PHYSICIAN ASSISTANT

## 2019-03-12 PROCEDURE — 1123F ACP DISCUSS/DSCN MKR DOCD: CPT | Performed by: PHYSICIAN ASSISTANT

## 2019-03-12 PROCEDURE — G8482 FLU IMMUNIZE ORDER/ADMIN: HCPCS | Performed by: PHYSICIAN ASSISTANT

## 2019-03-12 PROCEDURE — 4040F PNEUMOC VAC/ADMIN/RCVD: CPT | Performed by: PHYSICIAN ASSISTANT

## 2019-03-12 PROCEDURE — G8427 DOCREV CUR MEDS BY ELIG CLIN: HCPCS | Performed by: PHYSICIAN ASSISTANT

## 2019-04-08 ENCOUNTER — OFFICE VISIT (OUTPATIENT)
Dept: FAMILY MEDICINE CLINIC | Age: 64
End: 2019-04-08
Payer: MEDICARE

## 2019-04-08 VITALS
WEIGHT: 188 LBS | DIASTOLIC BLOOD PRESSURE: 90 MMHG | SYSTOLIC BLOOD PRESSURE: 138 MMHG | BODY MASS INDEX: 31.28 KG/M2 | HEART RATE: 68 BPM | OXYGEN SATURATION: 98 %

## 2019-04-08 DIAGNOSIS — Z00.00 ROUTINE GENERAL MEDICAL EXAMINATION AT A HEALTH CARE FACILITY: Primary | ICD-10-CM

## 2019-04-08 DIAGNOSIS — R73.09 ABNORMAL GLUCOSE: ICD-10-CM

## 2019-04-08 DIAGNOSIS — M48.061 SPINAL STENOSIS OF LUMBAR REGION, UNSPECIFIED WHETHER NEUROGENIC CLAUDICATION PRESENT: ICD-10-CM

## 2019-04-08 DIAGNOSIS — C61 PROSTATE CANCER (HCC): ICD-10-CM

## 2019-04-08 PROCEDURE — 4040F PNEUMOC VAC/ADMIN/RCVD: CPT | Performed by: FAMILY MEDICINE

## 2019-04-08 PROCEDURE — G0402 INITIAL PREVENTIVE EXAM: HCPCS | Performed by: FAMILY MEDICINE

## 2019-04-08 ASSESSMENT — ANXIETY QUESTIONNAIRES: GAD7 TOTAL SCORE: 0

## 2019-04-08 ASSESSMENT — LIFESTYLE VARIABLES
HOW OFTEN DO YOU HAVE A DRINK CONTAINING ALCOHOL: 4
HAVE YOU OR SOMEONE ELSE BEEN INJURED AS A RESULT OF YOUR DRINKING: 0
HOW MANY STANDARD DRINKS CONTAINING ALCOHOL DO YOU HAVE ON A TYPICAL DAY: 0
HAS A RELATIVE, FRIEND, DOCTOR, OR ANOTHER HEALTH PROFESSIONAL EXPRESSED CONCERN ABOUT YOUR DRINKING OR SUGGESTED YOU CUT DOWN: 0
HOW OFTEN DURING THE LAST YEAR HAVE YOU FOUND THAT YOU WERE NOT ABLE TO STOP DRINKING ONCE YOU HAD STARTED: 0
HOW OFTEN DURING THE LAST YEAR HAVE YOU FAILED TO DO WHAT WAS NORMALLY EXPECTED FROM YOU BECAUSE OF DRINKING: 0
HOW OFTEN DURING THE LAST YEAR HAVE YOU HAD A FEELING OF GUILT OR REMORSE AFTER DRINKING: 0
HOW OFTEN DURING THE LAST YEAR HAVE YOU NEEDED AN ALCOHOLIC DRINK FIRST THING IN THE MORNING TO GET YOURSELF GOING AFTER A NIGHT OF HEAVY DRINKING: 0
HOW OFTEN DURING THE LAST YEAR HAVE YOU BEEN UNABLE TO REMEMBER WHAT HAPPENED THE NIGHT BEFORE BECAUSE YOU HAD BEEN DRINKING: 0

## 2019-04-08 ASSESSMENT — PATIENT HEALTH QUESTIONNAIRE - PHQ9
SUM OF ALL RESPONSES TO PHQ QUESTIONS 1-9: 0
SUM OF ALL RESPONSES TO PHQ QUESTIONS 1-9: 0

## 2019-04-08 NOTE — PATIENT INSTRUCTIONS
Patient Education        Pneumococcal Conjugate Vaccine (PCV13): What You Need to Know  Why get vaccinated? Vaccination can protect both children and adults from pneumococcal disease. Pneumococcal disease is caused by bacteria that can spread from person to person through close contact. It can cause ear infections, and it can also lead to more serious infections of the:  · Lungs (pneumonia). · Blood (bacteremia). · Covering of the brain and spinal cord (meningitis). Pneumococcal pneumonia is most common among adults. Pneumococcal meningitis can cause deafness and brain damage, and it kills about 1 child in 10 who get it. Anyone can get pneumococcal disease, but children under 3years of age and adults 72 years and older, people with certain medical conditions, and cigarette smokers are at the highest risk. Before there was a vaccine, the [facility] saw the following in children under 5 each year from pneumococcal disease:  · More than 700 cases of meningitis  · About 13,000 blood infections  · About 5 million ear infections  · About 200 deaths  Since the vaccine became available, severe pneumococcal disease in these children has fallen by 88%. About 18,000 older adults die of pneumococcal disease each year in the [place]. Treatment of pneumococcal infections with penicillin and other drugs is not as effective as it used to be, because some strains of the disease have become resistant to these drugs. This makes prevention of the disease through vaccination even more important. PCV13 vaccine  Pneumococcal conjugate vaccine (called PCV13) protects against 13 types of pneumococcal bacteria. PCV13 is routinely given to children at 2, 4, 6, and 1515 months of age. It is also recommended for children and adults 3to 59years of age with certain health conditions, and for all adults 72years of age and older. Your doctor can give you details.   Some people should not get this vaccine  Anyone who has ever had a life-threatening allergic reaction to a dose of this vaccine, to an earlier pneumococcal vaccine called PCV7, or to any vaccine containing diphtheria toxoid (for example, DTaP), should not get PCV13. Anyone with a severe allergy to any component of PCV13 should not get the vaccine. Tell your doctor if the person being vaccinated has any severe allergies. If the person scheduled for vaccination is not feeling well, your healthcare provider might decide to reschedule the shot on another day. Risks of a vaccine reaction  With any medicine, including vaccines, there is a chance of reactions. These are usually mild and go away on their own, but serious reactions are also possible. Problems reported following PCV13 varied by age and dose in the series. The most common problems reported among children were:  · About half became drowsy after the shot, had a temporary loss of appetite, or had redness or tenderness where the shot was given. · About 1 out of 3 had swelling where the shot was given. · About 1 out of 3 had a mild fever, and about 1 in 20 had a fever over 102.2°F.  · Up to about 8 out of 10 became fussy or irritable. Adults have reported pain, redness, and swelling where the shot was given; also mild fever, fatigue, headache, chills, or muscle pain. Amish Martinez children who get PCV13 along with inactivated flu vaccine at the same time may be at increased risk for seizures caused by fever. Ask your doctor for more information. Problems that could happen after any vaccine:  · People sometimes faint after a medical procedure, including vaccination. Sitting or lying down for about 15 minutes can help prevent fainting and the injuries caused by a fall. Tell your doctor if you feel dizzy or have vision changes or ringing in the ears. · Some older children and adults get severe pain in the shoulder and have difficulty moving the arm where a shot was given. This happens very rarely.   · Any medication can cause a severe allergic reaction. Such reactions from a vaccine are very rare, estimated at about 1 in a million doses, and would happen within a few minutes to a few hours after the vaccination. As with any medicine, there is a very small chance of a vaccine causing a serious injury or death. The safety of vaccines is always being monitored. For more information, visit: www.cdc.gov/vaccinesafety. What if there is a serious reaction? What should I look for? · Look for anything that concerns you, such as signs of a severe allergic reaction, very high fever, or unusual behavior. Signs of a severe allergic reaction can include hives, swelling of the face and throat, difficulty breathing, a fast heartbeat, dizziness, and weakness, usually within a few minutes to a few hours after the vaccination. What should I do? · If you think it is a severe allergic reaction or other emergency that can't wait, call 911 or get the person to the nearest hospital. Otherwise, call your doctor. · Reactions should be reported to the Vaccine Adverse Event Reporting System (VAERS). Your doctor should file this report, or you can do it yourself through the VAERS website at www.vaers. [facility].gov, or by calling 9-154.591.6835. [employer] does not give medical advice. The National Vaccine Injury Compensation Program  The National Vaccine Injury Compensation Program (VICP) is a federal program that was created to compensate people who may have been injured by certain vaccines. Persons who believe they may have been injured by a vaccine can learn about the program and about filing a claim by calling 9-834.905.9617 or visiting the [address] website at www.[facility].gov/vaccinecompensation. There is a time limit to file a claim for compensation. How can I learn more? · Ask your healthcare provider. He or she can give you the vaccine package insert or suggest other sources of information. · Call your local or state health department.   · Contact the [facility] Disease Control and Prevention (CDC):  ? Call 2-916.415.3354 (1-800-CDC-INFO) or  ? Visit CDC's website at www.cdc.gov/vaccines  Vaccine Information Statement  PCV13 Vaccine  11/5/2015  42 QIAN [address] [id number]-32  Department of Health and Human Services  Centers for Disease Control and Prevention  Many Vaccine Information Statements are available in [place] and other languages. See www.immunize.org/vis. Muchas hojas de información sobre vacunas están disponibles en español y en otros idiomas. Visite www.immunize.org/vis. Care instructions adapted under license by [facility] ([facility]). If you have questions about a medical condition or this instruction, always ask your healthcare professional. [address] any warranty or liability for your use of this information. Personalized Preventive Plan for Val Rivera - 4/8/2019  Medicare offers a range of preventive health benefits. Some of the tests and screenings are paid in full while other may be subject to a deductible, co-insurance, and/or copay. Some of these benefits include a comprehensive review of your medical history including lifestyle, illnesses that may run in your family, and various assessments and screenings as appropriate. After reviewing your medical record and screening and assessments performed today your provider may have ordered immunizations, labs, imaging, and/or referrals for you. A list of these orders (if applicable) as well as your Preventive Care list are included within your After Visit Summary for your review. Other Preventive Recommendations:    · A preventive eye exam performed by an eye specialist is recommended every 1-2 years to screen for glaucoma; cataracts, macular degeneration, and other eye disorders. · A preventive dental visit is recommended every 6 months. · Try to get at least 150 minutes of exercise per week or 10,000 steps per day on a pedometer .   · Order or download the FREE \"Exercise & Physical Activity: Your Everyday Guide\" from The [employer]. Call 1-774.691.7940 or search The [employer] online. · You need 1236-4261 mg of calcium and 2472-4860 IU of vitamin D per day. It is possible to meet your calcium requirement with diet alone, but a vitamin D supplement is usually necessary to meet this goal.  · When exposed to the sun, use a sunscreen that protects against both UVA and UVB radiation with an SPF of 30 or greater. Reapply every 2 to 3 hours or after sweating, drying off with a towel, or swimming. · Always wear a seat belt when traveling in a car. Always wear a helmet when riding a bicycle or motorcycle.

## 2019-04-10 ENCOUNTER — HOSPITAL ENCOUNTER (OUTPATIENT)
Age: 64
Discharge: HOME | End: 2019-04-10
Payer: MEDICARE

## 2019-04-10 DIAGNOSIS — R73.09 ABNORMAL GLUCOSE: ICD-10-CM

## 2019-04-10 DIAGNOSIS — M54.16 LUMBAR RADICULITIS: ICD-10-CM

## 2019-04-10 DIAGNOSIS — M54.5 LOW BACK PAIN, UNSPECIFIED BACK PAIN LATERALITY, UNSPECIFIED CHRONICITY, WITH SCIATICA PRESENCE UNSPECIFIED: ICD-10-CM

## 2019-04-10 DIAGNOSIS — M51.36 DDD (DEGENERATIVE DISC DISEASE), LUMBAR: ICD-10-CM

## 2019-04-10 DIAGNOSIS — Z00.00 ROUTINE GENERAL MEDICAL EXAMINATION AT A HEALTH CARE FACILITY: ICD-10-CM

## 2019-04-10 LAB
A/G RATIO: 1.7 (ref 1.1–2.2)
ALBUMIN SERPL-MCNC: 4.5 G/DL (ref 3.4–5)
ALP BLD-CCNC: 98 U/L (ref 40–129)
ALT SERPL-CCNC: 17 U/L (ref 10–40)
ANION GAP SERPL CALCULATED.3IONS-SCNC: 11 MMOL/L (ref 3–16)
AST SERPL-CCNC: 21 U/L (ref 15–37)
BILIRUB SERPL-MCNC: 0.3 MG/DL (ref 0–1)
BUN BLDV-MCNC: 22 MG/DL (ref 7–20)
CALCIUM SERPL-MCNC: 10.4 MG/DL (ref 8.3–10.6)
CHLORIDE BLD-SCNC: 101 MMOL/L (ref 99–110)
CHOLESTEROL, TOTAL: 242 MG/DL (ref 0–199)
CO2: 27 MMOL/L (ref 21–32)
CREAT SERPL-MCNC: 1.2 MG/DL (ref 0.8–1.3)
GFR AFRICAN AMERICAN: >60
GFR NON-AFRICAN AMERICAN: >60
GLOBULIN: 2.7 G/DL
GLUCOSE BLD-MCNC: 97 MG/DL (ref 70–99)
HDLC SERPL-MCNC: 70 MG/DL (ref 40–60)
LDL CHOLESTEROL CALCULATED: 133 MG/DL
POTASSIUM SERPL-SCNC: 5.1 MMOL/L (ref 3.5–5.1)
PROSTATE SPECIFIC ANTIGEN: <0.01 NG/ML (ref 0–4)
SODIUM BLD-SCNC: 139 MMOL/L (ref 136–145)
TOTAL PROTEIN: 7.2 G/DL (ref 6.4–8.2)
TRIGL SERPL-MCNC: 197 MG/DL (ref 0–150)
VLDLC SERPL CALC-MCNC: 39 MG/DL

## 2019-04-10 PROCEDURE — 80061 LIPID PANEL: CPT

## 2019-04-10 PROCEDURE — 83036 HEMOGLOBIN GLYCOSYLATED A1C: CPT

## 2019-04-10 PROCEDURE — 36415 COLL VENOUS BLD VENIPUNCTURE: CPT

## 2019-04-10 PROCEDURE — 84153 ASSAY OF PSA TOTAL: CPT

## 2019-04-10 PROCEDURE — 80053 COMPREHEN METABOLIC PANEL: CPT

## 2019-04-10 PROCEDURE — 84403 ASSAY OF TOTAL TESTOSTERONE: CPT

## 2019-04-11 LAB
ESTIMATED AVERAGE GLUCOSE: 131.2 MG/DL
HBA1C MFR BLD: 6.2 %

## 2019-04-13 LAB — TESTOSTERONE TOTAL: <3 NG/DL (ref 220–1000)

## 2019-07-03 DIAGNOSIS — J30.1 SEASONAL ALLERGIC RHINITIS DUE TO POLLEN: ICD-10-CM

## 2019-07-03 RX ORDER — LORATADINE AND PSEUDOEPHEDRINE 10; 240 MG/1; MG/1
TABLET, EXTENDED RELEASE ORAL
Qty: 90 TABLET | Refills: 3 | Status: SHIPPED | OUTPATIENT
Start: 2019-07-03 | End: 2020-07-21 | Stop reason: SDUPTHER

## 2019-07-22 ENCOUNTER — OFFICE VISIT (OUTPATIENT)
Dept: ORTHOPEDIC SURGERY | Age: 64
End: 2019-07-22
Payer: MEDICARE

## 2019-07-22 VITALS
BODY MASS INDEX: 31.33 KG/M2 | DIASTOLIC BLOOD PRESSURE: 84 MMHG | HEIGHT: 65 IN | SYSTOLIC BLOOD PRESSURE: 153 MMHG | TEMPERATURE: 90 F | WEIGHT: 188.05 LBS

## 2019-07-22 DIAGNOSIS — M51.36 DDD (DEGENERATIVE DISC DISEASE), LUMBAR: ICD-10-CM

## 2019-07-22 DIAGNOSIS — M54.16 LUMBAR RADICULITIS: ICD-10-CM

## 2019-07-22 DIAGNOSIS — M48.061 SPINAL STENOSIS OF LUMBAR REGION, UNSPECIFIED WHETHER NEUROGENIC CLAUDICATION PRESENT: Primary | ICD-10-CM

## 2019-07-22 PROCEDURE — G8417 CALC BMI ABV UP PARAM F/U: HCPCS | Performed by: PHYSICIAN ASSISTANT

## 2019-07-22 PROCEDURE — 1123F ACP DISCUSS/DSCN MKR DOCD: CPT | Performed by: PHYSICIAN ASSISTANT

## 2019-07-22 PROCEDURE — 4040F PNEUMOC VAC/ADMIN/RCVD: CPT | Performed by: PHYSICIAN ASSISTANT

## 2019-07-22 PROCEDURE — 3017F COLORECTAL CA SCREEN DOC REV: CPT | Performed by: PHYSICIAN ASSISTANT

## 2019-07-22 PROCEDURE — G8427 DOCREV CUR MEDS BY ELIG CLIN: HCPCS | Performed by: PHYSICIAN ASSISTANT

## 2019-07-22 PROCEDURE — 99214 OFFICE O/P EST MOD 30 MIN: CPT | Performed by: PHYSICIAN ASSISTANT

## 2019-07-22 PROCEDURE — 1036F TOBACCO NON-USER: CPT | Performed by: PHYSICIAN ASSISTANT

## 2019-07-22 NOTE — LETTER
 Sennosides (SENNA LAXATIVE PO) Take by mouth      MAGNESIUM PO Take by mouth      Calcium Carb-Cholecalciferol 600-500 MG-UNIT CAPS Take 1,000 Units by mouth daily      leuprolide (LUPRON) 11.25 MG injection Inject 11.25 mg into the muscle once      acetaminophen (TYLENOL) 500 MG tablet Take 1 tablet by mouth every 6 hours as needed for Pain 120 tablet      No current facility-administered medications for this visit. [address]                     ______________________________________________________________________      [address] [place]      1. Admit to preop. 2. Start IV 1000 ml LR at [facility] or _____ml/hr for planned conscious sedation     3. May inject 1 % Lidocaine 0.1 ml Intradermal to numb IV site     4. Protime/INR if patient is on Coumadin     5. Urine Pregnancy Test (females only) - 12 -50 years     6. Accu Check Glucose if diabetic. Notify physician if <80 or >250.      7. Sedate all neurotomies          ______________________________________________________________________    POST-OPERATIVE ORDERS - DR. CHANG      1. Admit to Post Op Phase 2     2. Implement Standards of Care for Phase 2 Post Op     3. Check Site - May discharge when site is free of bleeding     4. Discharge to home after meets Phase 2 criteria     5. Discharge cervical patients after 30 minutes and when meets Phase 2 criteria. 6. Give discharge instruction sheet     7. For Diabetic patient, if blood sugar less than 80 in preop,          Recheck blood sugar in Post Op. 8. Discontinue IV     9.  For Nausea may give Zofran 4 MG IV/IM/ODT           ______________________________________________________________________    Jose Avalos     1955 7/22/19 3:35 PM

## 2019-07-22 NOTE — PROGRESS NOTES
symmetrically 1+ at the patellar and ankle tendons; exception of right patellar trace with reinforcement. Clonus absent bilaterally at the feet. · Gait & station: Mildly antalgic unassisted  · Additional Examinations:   · RIGHT LOWER EXTREMITY: Inspection/examination of the right lower extremity does not show any tenderness, deformity or injury. Range of motion is full. There is no gross instability. There are no rashes, ulcerations or lesions. Strength and tone are normal. LEFT LOWER EXTREMITY:  Inspection/examination of the left lower extremity does not show any tenderness, deformity or injury. Range of motion is full. There is no gross instability. There are no rashes, ulcerations or lesions. Strength and tone are normal.    Diagnostic Testin-6-19 MRI independently reviewed showing multilevel scoliosis, ddd, spondylosis, varying mostly mild lumbar central narrowing-moderate L3-4. Leg symptoms clinically from lumbar spine. 2 views lumbar spine 2019 shows scoliosis with multilevel at least moderate DDD/spondylosis and facet arthropathy. Lateral view is skewed due to scoliotic curve. Arterial dopplers 19  Conclusions        Summary        1. Normal bilateral lower extremity arterial segmental evaluation. No    evidence for resting arterial insufficiency.    2. The patient's vessels suggest possible non-compliance with    non-compressible areas noted.            Impression:  1) Chronic LBP, bilateral leg pain, neurogenic medication  2) Scoliosis, L3 4 central stenosis  3) H/o prostate cancer  4) Negative vascular eval     Plan:   1) Midline L5-S1 LESI #2, procedure risk and benefits discussed  2) Cont HEP  3) F/u after 19 [address] [facility]

## 2019-07-23 ENCOUNTER — TELEPHONE (OUTPATIENT)
Dept: ORTHOPEDIC SURGERY | Age: 64
End: 2019-07-23

## 2019-07-30 ENCOUNTER — HOSPITAL ENCOUNTER (OUTPATIENT)
Age: 64
Setting detail: OUTPATIENT SURGERY
Discharge: HOME | End: 2019-07-30
Attending: PHYSICAL MEDICINE & REHABILITATION | Admitting: PHYSICAL MEDICINE & REHABILITATION
Payer: MEDICARE

## 2019-07-30 VITALS
TEMPERATURE: 97.3 F | HEART RATE: 72 BPM | BODY MASS INDEX: 30.22 KG/M2 | HEIGHT: 66 IN | SYSTOLIC BLOOD PRESSURE: 175 MMHG | OXYGEN SATURATION: 100 % | DIASTOLIC BLOOD PRESSURE: 104 MMHG | WEIGHT: 188 LBS | RESPIRATION RATE: 16 BRPM

## 2019-07-30 PROCEDURE — 7100000010 HC PHASE II RECOVERY - FIRST 15 MIN: Performed by: PHYSICAL MEDICINE & REHABILITATION

## 2019-07-30 PROCEDURE — 2500000003 HC RX 250 WO HCPCS: Performed by: PHYSICAL MEDICINE & REHABILITATION

## 2019-07-30 PROCEDURE — 6360000002 HC RX W HCPCS: Performed by: PHYSICAL MEDICINE & REHABILITATION

## 2019-07-30 PROCEDURE — 6360000004 HC RX CONTRAST MEDICATION: Performed by: PHYSICAL MEDICINE & REHABILITATION

## 2019-07-30 PROCEDURE — 2709999900 HC NON-CHARGEABLE SUPPLY: Performed by: PHYSICAL MEDICINE & REHABILITATION

## 2019-07-30 PROCEDURE — 3600000002 HC SURGERY LEVEL 2 BASE: Performed by: PHYSICAL MEDICINE & REHABILITATION

## 2019-07-30 RX ORDER — LIDOCAINE HYDROCHLORIDE 10 MG/ML
INJECTION, SOLUTION EPIDURAL; INFILTRATION; INTRACAUDAL; PERINEURAL PRN
Status: DISCONTINUED | OUTPATIENT
Start: 2019-07-30 | End: 2019-07-30 | Stop reason: ALTCHOICE

## 2019-07-30 RX ORDER — TURMERIC ROOT EXTRACT 500 MG
500 TABLET ORAL 2 TIMES DAILY
COMMUNITY

## 2019-07-30 RX ORDER — UBIDECARENONE 50 MG
600 CAPSULE ORAL 2 TIMES DAILY
COMMUNITY

## 2019-07-30 RX ORDER — CHLORAL HYDRATE 500 MG
1000 CAPSULE ORAL 2 TIMES DAILY
COMMUNITY

## 2019-07-30 RX ORDER — AMPICILLIN TRIHYDRATE 250 MG
500 CAPSULE ORAL 2 TIMES DAILY
COMMUNITY

## 2019-07-30 ASSESSMENT — PAIN - FUNCTIONAL ASSESSMENT
PAIN_FUNCTIONAL_ASSESSMENT: PREVENTS OR INTERFERES SOME ACTIVE ACTIVITIES AND ADLS
PAIN_FUNCTIONAL_ASSESSMENT: 0-10

## 2019-07-30 ASSESSMENT — PAIN SCALES - GENERAL: PAINLEVEL_OUTOF10: 0

## 2019-07-30 ASSESSMENT — PAIN DESCRIPTION - DESCRIPTORS: DESCRIPTORS: ACHING

## 2019-08-05 ENCOUNTER — HOSPITAL ENCOUNTER (OUTPATIENT)
Age: 64
Discharge: HOME | End: 2019-08-05
Payer: MEDICARE

## 2019-08-05 PROCEDURE — 84403 ASSAY OF TOTAL TESTOSTERONE: CPT

## 2019-08-05 PROCEDURE — 36415 COLL VENOUS BLD VENIPUNCTURE: CPT

## 2019-08-05 PROCEDURE — 84153 ASSAY OF PSA TOTAL: CPT

## 2019-08-06 LAB — PROSTATE SPECIFIC ANTIGEN: <0.01 NG/ML (ref 0–4)

## 2019-08-08 LAB — TESTOSTERONE TOTAL: <3 NG/DL (ref 220–1000)

## 2019-08-13 ENCOUNTER — OFFICE VISIT (OUTPATIENT)
Dept: ORTHOPEDIC SURGERY | Age: 64
End: 2019-08-13
Payer: MEDICARE

## 2019-08-13 VITALS
BODY MASS INDEX: 30.22 KG/M2 | DIASTOLIC BLOOD PRESSURE: 90 MMHG | HEIGHT: 66 IN | HEART RATE: 80 BPM | WEIGHT: 188.05 LBS | SYSTOLIC BLOOD PRESSURE: 144 MMHG

## 2019-08-13 DIAGNOSIS — M48.061 SPINAL STENOSIS OF LUMBAR REGION, UNSPECIFIED WHETHER NEUROGENIC CLAUDICATION PRESENT: Primary | ICD-10-CM

## 2019-08-13 DIAGNOSIS — M51.36 DDD (DEGENERATIVE DISC DISEASE), LUMBAR: ICD-10-CM

## 2019-08-13 DIAGNOSIS — M54.16 LUMBAR RADICULITIS: ICD-10-CM

## 2019-08-13 PROCEDURE — 1036F TOBACCO NON-USER: CPT | Performed by: PHYSICIAN ASSISTANT

## 2019-08-13 PROCEDURE — 4040F PNEUMOC VAC/ADMIN/RCVD: CPT | Performed by: PHYSICIAN ASSISTANT

## 2019-08-13 PROCEDURE — 99213 OFFICE O/P EST LOW 20 MIN: CPT | Performed by: PHYSICIAN ASSISTANT

## 2019-08-13 PROCEDURE — 1123F ACP DISCUSS/DSCN MKR DOCD: CPT | Performed by: PHYSICIAN ASSISTANT

## 2019-08-13 PROCEDURE — G8417 CALC BMI ABV UP PARAM F/U: HCPCS | Performed by: PHYSICIAN ASSISTANT

## 2019-08-13 PROCEDURE — 3017F COLORECTAL CA SCREEN DOC REV: CPT | Performed by: PHYSICIAN ASSISTANT

## 2019-08-13 PROCEDURE — G8427 DOCREV CUR MEDS BY ELIG CLIN: HCPCS | Performed by: PHYSICIAN ASSISTANT

## 2019-08-13 NOTE — PROGRESS NOTES
tab  Past Medical History:   Diagnosis Date    Actinic keratoses     Allergic rhinitis     Arthritis     Environmental allergies     Prostate cancer ([facility] [address]     PROSTATE      Past Surgical History:     Past Surgical History:   Procedure Laterality Date    COLONOSCOPY  2-16-16    EPIDURAL STEROID INJECTION N/A 2/26/2019    MIDLINE LUMBAR FIVE SACRAL ONE EPIDURAL STEROID INJECTION SITE CONFIRMED BY FLUOROSCOPY performed by Katie Martin MD at [facility] N/A 7/30/2019    MIDLINE LUMBAR FIVE SACRAL ONE EPIDURAL STEROID INJECTION SITE CONFIRMED BY FLUOROSCOPY performed by Katie Martin MD at [facility] [address] ARTHROSCOPY Left 4/26/16    partial medial and lateral meniscectomy, chondroplasty    KNEE SURGERY Right 10/15/13    RT KNEE PARTIAL MEDIAL, LATERAL MENISECTOMY AND CHONDROPLASTY WITH REMOVAL OF LOSSE HARDWARE    KNEE SURGERY Right 10/15/13    RT KNEE ARTHROSCOPY PARTIAL LATERAL AND MEDIAL MENISECTOMY WITH CHONDROPLASTY AND REMOVAL OF LOOSE BODIES    PROSTATECTOMY  07/17/15    DaVinci prostatectomy    SHOULDER SURGERY       Current Medications:     Current Outpatient Medications:     Red Yeast Rice 600 MG TABS, Take 600 mg by mouth 2 times daily, Disp: , Rfl:     Turmeric 500 MG TABS, Take 500 mg by mouth 2 times daily, Disp: , Rfl:     Cinnamon 500 MG CAPS, Take 500 mg by mouth 2 times daily, Disp: , Rfl:     Omega-3 Fatty Acids (FISH OIL) 1000 MG CAPS, Take 2,000 mg by mouth 2 times daily, Disp: , Rfl:     loratadine-pseudoephedrine (ALLERGY RELIEF D-24)  MG per extended release tablet, TAKE ONE TABLET BY MOUTH DAILY, Disp: 90 tablet, Rfl: 3    potassium chloride (KLOR-CON) 20 MEQ packet, Take 20 mEq by mouth 2 times daily, Disp: , Rfl:     meloxicam (MOBIC) 15 MG tablet, Take 1 tablet by mouth daily, Disp: 90 tablet, Rfl: 3    donepezil (ARICEPT) 5 MG tablet, Take 1 tablet by mouth nightly, Disp: 90 tablet, Rfl: 3    fluticasone

## 2019-08-26 DIAGNOSIS — M19.90 ARTHRITIS: ICD-10-CM

## 2019-08-26 DIAGNOSIS — R41.3 MEMORY DISTURBANCE: ICD-10-CM

## 2019-08-26 RX ORDER — MELOXICAM 15 MG/1
TABLET ORAL
Qty: 90 TABLET | Refills: 3 | Status: SHIPPED | OUTPATIENT
Start: 2019-08-26 | End: 2019-10-08 | Stop reason: SDUPTHER

## 2019-08-26 RX ORDER — DONEPEZIL HYDROCHLORIDE 5 MG/1
5 TABLET, FILM COATED ORAL NIGHTLY
Qty: 90 TABLET | Refills: 3 | Status: SHIPPED | OUTPATIENT
Start: 2019-08-26 | End: 2020-04-13 | Stop reason: SDUPTHER

## 2019-09-16 ENCOUNTER — HOSPITAL ENCOUNTER (OUTPATIENT)
Age: 64
Discharge: HOME | End: 2019-09-16
Payer: MEDICARE

## 2019-09-16 ENCOUNTER — OFFICE VISIT (OUTPATIENT)
Dept: FAMILY MEDICINE CLINIC | Age: 64
End: 2019-09-16
Payer: MEDICARE

## 2019-09-16 ENCOUNTER — HOSPITAL ENCOUNTER (OUTPATIENT)
Dept: GENERAL RADIOLOGY | Age: 64
Discharge: HOME | End: 2019-09-16
Payer: MEDICARE

## 2019-09-16 VITALS
DIASTOLIC BLOOD PRESSURE: 88 MMHG | HEART RATE: 72 BPM | SYSTOLIC BLOOD PRESSURE: 136 MMHG | BODY MASS INDEX: 31.34 KG/M2 | OXYGEN SATURATION: 96 % | HEIGHT: 66 IN | WEIGHT: 195 LBS

## 2019-09-16 DIAGNOSIS — R73.09 ABNORMAL GLUCOSE: ICD-10-CM

## 2019-09-16 DIAGNOSIS — E78.5 HYPERLIPIDEMIA, UNSPECIFIED HYPERLIPIDEMIA TYPE: ICD-10-CM

## 2019-09-16 DIAGNOSIS — M25.532 LEFT WRIST PAIN: Primary | ICD-10-CM

## 2019-09-16 DIAGNOSIS — M25.532 LEFT WRIST PAIN: ICD-10-CM

## 2019-09-16 PROCEDURE — G8417 CALC BMI ABV UP PARAM F/U: HCPCS | Performed by: FAMILY MEDICINE

## 2019-09-16 PROCEDURE — 3017F COLORECTAL CA SCREEN DOC REV: CPT | Performed by: FAMILY MEDICINE

## 2019-09-16 PROCEDURE — 1123F ACP DISCUSS/DSCN MKR DOCD: CPT | Performed by: FAMILY MEDICINE

## 2019-09-16 PROCEDURE — 73110 X-RAY EXAM OF WRIST: CPT

## 2019-09-16 PROCEDURE — G8427 DOCREV CUR MEDS BY ELIG CLIN: HCPCS | Performed by: FAMILY MEDICINE

## 2019-09-16 PROCEDURE — 99213 OFFICE O/P EST LOW 20 MIN: CPT | Performed by: FAMILY MEDICINE

## 2019-09-16 PROCEDURE — 1036F TOBACCO NON-USER: CPT | Performed by: FAMILY MEDICINE

## 2019-09-16 PROCEDURE — 4040F PNEUMOC VAC/ADMIN/RCVD: CPT | Performed by: FAMILY MEDICINE

## 2019-09-16 ASSESSMENT — ENCOUNTER SYMPTOMS: SHORTNESS OF BREATH: 0

## 2019-10-08 ENCOUNTER — PATIENT MESSAGE (OUTPATIENT)
Dept: FAMILY MEDICINE CLINIC | Age: 64
End: 2019-10-08

## 2019-10-08 DIAGNOSIS — M19.90 ARTHRITIS: ICD-10-CM

## 2019-10-08 RX ORDER — MELOXICAM 15 MG/1
TABLET ORAL
Qty: 45 TABLET | Refills: 0 | Status: SHIPPED | OUTPATIENT
Start: 2019-10-08 | End: 2020-12-23

## 2019-10-22 ENCOUNTER — HOSPITAL ENCOUNTER (OUTPATIENT)
Age: 64
Setting detail: OUTPATIENT SURGERY
Discharge: HOME | End: 2019-10-22
Attending: PHYSICAL MEDICINE & REHABILITATION | Admitting: PHYSICAL MEDICINE & REHABILITATION
Payer: MEDICARE

## 2019-10-22 VITALS
HEART RATE: 66 BPM | BODY MASS INDEX: 29.57 KG/M2 | HEIGHT: 66 IN | WEIGHT: 184 LBS | TEMPERATURE: 97.6 F | OXYGEN SATURATION: 99 % | RESPIRATION RATE: 16 BRPM | SYSTOLIC BLOOD PRESSURE: 136 MMHG | DIASTOLIC BLOOD PRESSURE: 88 MMHG

## 2019-10-22 PROCEDURE — 3600000002 HC SURGERY LEVEL 2 BASE: Performed by: PHYSICAL MEDICINE & REHABILITATION

## 2019-10-22 PROCEDURE — 7100000010 HC PHASE II RECOVERY - FIRST 15 MIN: Performed by: PHYSICAL MEDICINE & REHABILITATION

## 2019-10-22 PROCEDURE — 6360000004 HC RX CONTRAST MEDICATION: Performed by: PHYSICAL MEDICINE & REHABILITATION

## 2019-10-22 PROCEDURE — 6360000002 HC RX W HCPCS: Performed by: PHYSICAL MEDICINE & REHABILITATION

## 2019-10-22 PROCEDURE — 2709999900 HC NON-CHARGEABLE SUPPLY: Performed by: PHYSICAL MEDICINE & REHABILITATION

## 2019-10-22 PROCEDURE — 2500000003 HC RX 250 WO HCPCS: Performed by: PHYSICAL MEDICINE & REHABILITATION

## 2019-10-22 ASSESSMENT — PAIN DESCRIPTION - DESCRIPTORS: DESCRIPTORS: ACHING;SHARP

## 2019-10-22 ASSESSMENT — PAIN SCALES - GENERAL: PAINLEVEL_OUTOF10: 0

## 2019-11-07 ENCOUNTER — OFFICE VISIT (OUTPATIENT)
Dept: ORTHOPEDIC SURGERY | Age: 64
End: 2019-11-07
Payer: MEDICARE

## 2019-11-07 VITALS — HEIGHT: 66 IN | WEIGHT: 184.08 LBS | BODY MASS INDEX: 29.58 KG/M2

## 2019-11-07 DIAGNOSIS — M51.36 DDD (DEGENERATIVE DISC DISEASE), LUMBAR: ICD-10-CM

## 2019-11-07 DIAGNOSIS — M48.061 SPINAL STENOSIS OF LUMBAR REGION, UNSPECIFIED WHETHER NEUROGENIC CLAUDICATION PRESENT: Primary | ICD-10-CM

## 2019-11-07 DIAGNOSIS — M54.16 LUMBAR RADICULITIS: ICD-10-CM

## 2019-11-07 PROCEDURE — G8484 FLU IMMUNIZE NO ADMIN: HCPCS | Performed by: PHYSICIAN ASSISTANT

## 2019-11-07 PROCEDURE — G8427 DOCREV CUR MEDS BY ELIG CLIN: HCPCS | Performed by: PHYSICIAN ASSISTANT

## 2019-11-07 PROCEDURE — 1036F TOBACCO NON-USER: CPT | Performed by: PHYSICIAN ASSISTANT

## 2019-11-07 PROCEDURE — G8417 CALC BMI ABV UP PARAM F/U: HCPCS | Performed by: PHYSICIAN ASSISTANT

## 2019-11-07 PROCEDURE — 99212 OFFICE O/P EST SF 10 MIN: CPT | Performed by: PHYSICIAN ASSISTANT

## 2019-11-07 PROCEDURE — 1123F ACP DISCUSS/DSCN MKR DOCD: CPT | Performed by: PHYSICIAN ASSISTANT

## 2019-11-07 PROCEDURE — 4040F PNEUMOC VAC/ADMIN/RCVD: CPT | Performed by: PHYSICIAN ASSISTANT

## 2019-11-07 PROCEDURE — 3017F COLORECTAL CA SCREEN DOC REV: CPT | Performed by: PHYSICIAN ASSISTANT

## 2019-12-03 ENCOUNTER — HOSPITAL ENCOUNTER (OUTPATIENT)
Dept: GENERAL RADIOLOGY | Age: 64
Discharge: HOME | End: 2019-12-03
Payer: MEDICARE

## 2019-12-03 DIAGNOSIS — C61 MALIGNANT NEOPLASM OF PROSTATE (HCC): ICD-10-CM

## 2019-12-03 PROCEDURE — 77080 DXA BONE DENSITY AXIAL: CPT

## 2019-12-04 ENCOUNTER — HOSPITAL ENCOUNTER (OUTPATIENT)
Age: 64
Discharge: HOME | End: 2019-12-04
Payer: MEDICARE

## 2019-12-04 DIAGNOSIS — R73.09 ABNORMAL GLUCOSE: ICD-10-CM

## 2019-12-04 DIAGNOSIS — E78.5 HYPERLIPIDEMIA, UNSPECIFIED HYPERLIPIDEMIA TYPE: ICD-10-CM

## 2019-12-04 DIAGNOSIS — M25.532 LEFT WRIST PAIN: ICD-10-CM

## 2019-12-04 LAB
A/G RATIO: 1.4 (ref 1.1–2.2)
ALBUMIN SERPL-MCNC: 4.6 G/DL (ref 3.4–5)
ALP BLD-CCNC: 101 U/L (ref 40–129)
ALT SERPL-CCNC: 17 U/L (ref 10–40)
ANION GAP SERPL CALCULATED.3IONS-SCNC: 14 MMOL/L (ref 3–16)
AST SERPL-CCNC: 22 U/L (ref 15–37)
BILIRUB SERPL-MCNC: 0.4 MG/DL (ref 0–1)
BUN BLDV-MCNC: 21 MG/DL (ref 7–20)
CALCIUM SERPL-MCNC: 9.9 MG/DL (ref 8.3–10.6)
CHLORIDE BLD-SCNC: 104 MMOL/L (ref 99–110)
CHOLESTEROL, TOTAL: 217 MG/DL (ref 0–199)
CO2: 24 MMOL/L (ref 21–32)
CREAT SERPL-MCNC: 1 MG/DL (ref 0.8–1.3)
GFR AFRICAN AMERICAN: >60
GFR NON-AFRICAN AMERICAN: >60
GLOBULIN: 3.2 G/DL
GLUCOSE BLD-MCNC: 106 MG/DL (ref 70–99)
HDLC SERPL-MCNC: 92 MG/DL (ref 40–60)
LDL CHOLESTEROL CALCULATED: 110 MG/DL
POTASSIUM SERPL-SCNC: 4.2 MMOL/L (ref 3.5–5.1)
PROSTATE SPECIFIC ANTIGEN: <0.01 NG/ML (ref 0–4)
SODIUM BLD-SCNC: 142 MMOL/L (ref 136–145)
TOTAL PROTEIN: 7.8 G/DL (ref 6.4–8.2)
TRIGL SERPL-MCNC: 77 MG/DL (ref 0–150)
URIC ACID, SERUM: 5.7 MG/DL (ref 3.5–7.2)
VLDLC SERPL CALC-MCNC: 15 MG/DL

## 2019-12-04 PROCEDURE — 36415 COLL VENOUS BLD VENIPUNCTURE: CPT

## 2019-12-04 PROCEDURE — 84153 ASSAY OF PSA TOTAL: CPT

## 2019-12-04 PROCEDURE — 80061 LIPID PANEL: CPT

## 2019-12-04 PROCEDURE — 84403 ASSAY OF TOTAL TESTOSTERONE: CPT

## 2019-12-04 PROCEDURE — 80053 COMPREHEN METABOLIC PANEL: CPT

## 2019-12-04 PROCEDURE — 84550 ASSAY OF BLOOD/URIC ACID: CPT

## 2019-12-04 PROCEDURE — 83036 HEMOGLOBIN GLYCOSYLATED A1C: CPT

## 2019-12-05 LAB
ESTIMATED AVERAGE GLUCOSE: 122.6 MG/DL
HBA1C MFR BLD: 5.9 %

## 2019-12-07 LAB — TESTOSTERONE TOTAL: <3 NG/DL (ref 220–1000)

## 2020-01-23 NOTE — PROGRESS NOTES
[facility]) Dermatology  Dandre Mon  1955    75 y.o. male     Date of Visit: 1/27/2020    Last Visit: 1yr    Chief Complaint: Skin check    History of Present Illness:  1. Here for skin check. History of actinic keratoses s/p cryotherapy.   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 Always wears jeans.   -Reports rough lesions on L cheek     2. Complains of a pruritic lesion near R ear     3. Concerned about a raised lesion on R lower leg that he believes was that site of a tick bite. Review of Systems:  Constitutional: Reports general sense of well-being. Skin: No interval severe sunburns. Allergies: Reviewed and updated. Past Medical History, Surgical History, Medications and Allergies reviewed.      Past Medical History:   Diagnosis Date    Actinic keratoses     Allergic rhinitis     Arthritis     Environmental allergies     Prostate cancer ([facility] [address]     PROSTATE     Past Surgical History:   Procedure Laterality Date    COLONOSCOPY  2-16-16    EPIDURAL STEROID INJECTION N/A 2/26/2019    MIDLINE LUMBAR FIVE SACRAL ONE EPIDURAL STEROID INJECTION SITE CONFIRMED BY FLUOROSCOPY performed by Hermes Salazar MD at [facility] N/A 7/30/2019    MIDLINE LUMBAR FIVE SACRAL ONE EPIDURAL STEROID INJECTION SITE CONFIRMED BY FLUOROSCOPY performed by Hermes Salazar MD at [facility] N/A 10/22/2019    MIDLINE LUMBAR FIVE SACRAL ONE EPIDURAL STEROID INJECTION SITE CONFIRMED BY FLUOROSCOPY performed by Hermes Salazar MD at . [address] ARTHROSCOPY Left 4/26/16    partial medial and lateral meniscectomy, chondroplasty    KNEE SURGERY Right 10/15/13    RT KNEE PARTIAL MEDIAL, LATERAL MENISECTOMY AND CHONDROPLASTY WITH REMOVAL OF LOSSE HARDWARE    KNEE SURGERY Right 10/15/13    RT KNEE ARTHROSCOPY PARTIAL LATERAL AND MEDIAL MENISECTOMY WITH CHONDROPLASTY AND REMOVAL OF LOOSE BODIES    PROSTATECTOMY  07/17/15    Howieincgill prostatectomy    SHOULDER SURGERY         Allergies   Allergen Reactions    Cephalexin Hives     Outpatient Medications Marked as Taking for the 1/27/20 encounter (Office Visit) with Hector He MD   Medication Sig Dispense Refill    meloxicam (MOBIC) 15 MG tablet TAKE 1 TABLET BY MOUTH ONCE DAILY 45 tablet 0    diclofenac sodium 1 % GEL Apply 2 g topically 4 times daily 2 Tube 1    donepezil (ARICEPT) 5 MG tablet TAKE 1 TABLET BY MOUTH NIGHTLY 90 tablet 3    Red Yeast Rice 600 MG TABS Take 600 mg by mouth 2 times daily      Turmeric 500 MG TABS Take 500 mg by mouth 2 times daily      Cinnamon 500 MG CAPS Take 500 mg by mouth 2 times daily      Omega-3 Fatty Acids (FISH OIL) 1000 MG CAPS Take 2,000 mg by mouth 2 times daily      loratadine-pseudoephedrine (ALLERGY RELIEF D-24)  MG per extended release tablet TAKE ONE TABLET BY MOUTH DAILY 90 tablet 3    potassium chloride (KLOR-CON) 20 MEQ packet Take 20 mEq by mouth 2 times daily      fluticasone (FLONASE) 50 MCG/ACT nasal spray 2 sprays bilaterally qd 3 Bottle 3    vitamin D 1000 units CAPS Take by mouth      aspirin 81 MG tablet Take 81 mg by mouth daily      Sennosides (SENNA LAXATIVE PO) Take by mouth      MAGNESIUM PO Take by mouth      Calcium Carb-Cholecalciferol 600-500 MG-UNIT CAPS Take 1,000 Units by mouth daily      leuprolide (LUPRON) 11.25 MG injection Inject 11.25 mg into the muscle once      acetaminophen (TYLENOL) 500 MG tablet Take 1 tablet by mouth every 6 hours as needed for Pain 120 tablet      Social History: Bee Mancini. Wife is RN at [employer]. Physical Examination     The following were examined and determined to be normal: Psych/Neuro, Scalp/hair, Conjunctivae/eyelids, Gums/teeth/lips, Breast/axilla/chest, Abdomen, Back, RUE, LUE, LLE, Nails/digits and Genitalia/groin/buttocks.     The following were examined and determined to be abnormal: Head/face, Neck, RLE    -General: Well-appearing, NAD  1. R 1 and L 1 helices, L posterior neck 2, L cheek 3, R temple 1 - ill-defined irregularly-shaped roughly-scaling thin pink macule(s)/papule(s)   2. R preauricular 1 - well-defined \"stuck-on\" verrucous tan-brown papule(s) w/ surrounding bright erythema   3. R lower leg - firm round pinkish-brown papule(s)     Assessment and Plan     1. Actinic keratosis(es)  -Edu re: relationship with chronic cumulative sun exposure, low premalignant potential.   -8 lesion(s) treated w/ liquid nitrogen x 2 cycles - R 1 and L 1 helices, L posterior neck 2, L cheek 3, R temple 1. Edu re: risk of blister formation, discomfort, scar, hypopigmentation. Discussed wound care. -Reviewed sun protective behavior -- sun avoidance during the peak hours of the day, sun-protective clothing (including hat and sunglasses), sunscreen use (water resistant, broad spectrum, SPF at least 30, need for reapplication every 2 to 3 hours). -Return for full skin exam in 1 year (sooner if indicated)      2. Inflamed seborrheic keratosis(es)  -Reassurance re: benignity  -1 lesion(s) treated w/ liquid nitrogen x 2 cycles - R preauricular. Edu re: risk of blister formation, discomfort, scar, hypopigmentation. Discussed wound care.        3. Dermatofibroma(s)  -Reassurance re: benignity

## 2020-01-27 ENCOUNTER — OFFICE VISIT (OUTPATIENT)
Dept: DERMATOLOGY | Age: 65
End: 2020-01-27
Payer: MEDICARE

## 2020-01-27 PROCEDURE — G8484 FLU IMMUNIZE NO ADMIN: HCPCS | Performed by: DERMATOLOGY

## 2020-01-27 PROCEDURE — 17110 DESTRUCTION B9 LES UP TO 14: CPT | Performed by: DERMATOLOGY

## 2020-01-27 PROCEDURE — 3017F COLORECTAL CA SCREEN DOC REV: CPT | Performed by: DERMATOLOGY

## 2020-01-27 PROCEDURE — 4040F PNEUMOC VAC/ADMIN/RCVD: CPT | Performed by: DERMATOLOGY

## 2020-01-27 PROCEDURE — G8417 CALC BMI ABV UP PARAM F/U: HCPCS | Performed by: DERMATOLOGY

## 2020-01-27 PROCEDURE — 17003 DESTRUCT PREMALG LES 2-14: CPT | Performed by: DERMATOLOGY

## 2020-01-27 PROCEDURE — 1036F TOBACCO NON-USER: CPT | Performed by: DERMATOLOGY

## 2020-01-27 PROCEDURE — 17000 DESTRUCT PREMALG LESION: CPT | Performed by: DERMATOLOGY

## 2020-01-27 PROCEDURE — 1123F ACP DISCUSS/DSCN MKR DOCD: CPT | Performed by: DERMATOLOGY

## 2020-01-27 PROCEDURE — G8427 DOCREV CUR MEDS BY ELIG CLIN: HCPCS | Performed by: DERMATOLOGY

## 2020-01-27 PROCEDURE — 99213 OFFICE O/P EST LOW 20 MIN: CPT | Performed by: DERMATOLOGY

## 2020-03-06 ENCOUNTER — TELEPHONE (OUTPATIENT)
Dept: ORTHOPEDIC SURGERY | Age: 65
End: 2020-03-06

## 2020-03-06 NOTE — TELEPHONE ENCOUNTER
RETURNED PATIENTS CALL RE NEEDING AN APPT TO DISCUSS REPEAT AMPARO'S.    L/M, TO CALL [phone] AND SHCEDULE A F/U THRU CENTRAL SCHEDULING

## 2020-03-12 ENCOUNTER — OFFICE VISIT (OUTPATIENT)
Dept: ORTHOPEDIC SURGERY | Age: 65
End: 2020-03-12
Payer: MEDICARE

## 2020-03-12 VITALS
BODY MASS INDEX: 29.58 KG/M2 | WEIGHT: 184.08 LBS | DIASTOLIC BLOOD PRESSURE: 74 MMHG | SYSTOLIC BLOOD PRESSURE: 141 MMHG | HEIGHT: 66 IN | HEART RATE: 76 BPM

## 2020-03-12 PROCEDURE — 3017F COLORECTAL CA SCREEN DOC REV: CPT | Performed by: PHYSICIAN ASSISTANT

## 2020-03-12 PROCEDURE — G8484 FLU IMMUNIZE NO ADMIN: HCPCS | Performed by: PHYSICIAN ASSISTANT

## 2020-03-12 PROCEDURE — 4040F PNEUMOC VAC/ADMIN/RCVD: CPT | Performed by: PHYSICIAN ASSISTANT

## 2020-03-12 PROCEDURE — G8417 CALC BMI ABV UP PARAM F/U: HCPCS | Performed by: PHYSICIAN ASSISTANT

## 2020-03-12 PROCEDURE — 1036F TOBACCO NON-USER: CPT | Performed by: PHYSICIAN ASSISTANT

## 2020-03-12 PROCEDURE — 1123F ACP DISCUSS/DSCN MKR DOCD: CPT | Performed by: PHYSICIAN ASSISTANT

## 2020-03-12 PROCEDURE — 99214 OFFICE O/P EST MOD 30 MIN: CPT | Performed by: PHYSICIAN ASSISTANT

## 2020-03-12 PROCEDURE — G8427 DOCREV CUR MEDS BY ELIG CLIN: HCPCS | Performed by: PHYSICIAN ASSISTANT

## 2020-03-12 NOTE — LETTER
 Turmeric 500 MG TABS Take 500 mg by mouth 2 times daily      Cinnamon 500 MG CAPS Take 500 mg by mouth 2 times daily      Omega-3 Fatty Acids (FISH OIL) 1000 MG CAPS Take 2,000 mg by mouth 2 times daily      loratadine-pseudoephedrine (ALLERGY RELIEF D-24)  MG per extended release tablet TAKE ONE TABLET BY MOUTH DAILY 90 tablet 3    potassium chloride (KLOR-CON) 20 MEQ packet Take 20 mEq by mouth 2 times daily      fluticasone (FLONASE) 50 MCG/ACT nasal spray 2 sprays bilaterally qd 3 Bottle 3    vitamin D 1000 units CAPS Take by mouth      aspirin 81 MG tablet Take 81 mg by mouth daily      Sennosides (SENNA LAXATIVE PO) Take by mouth      MAGNESIUM PO Take by mouth      Calcium Carb-Cholecalciferol 600-500 MG-UNIT CAPS Take 1,000 Units by mouth daily      leuprolide (LUPRON) 11.25 MG injection Inject 11.25 mg into the muscle once      acetaminophen (TYLENOL) 500 MG tablet Take 1 tablet by mouth every 6 hours as needed for Pain 120 tablet      No current facility-administered medications for this visit. [address]                     ______________________________________________________________________      [address] [place]      1. Admit to preop. 2. Start IV 1000 ml LR at [facility] or _____ml/hr for planned conscious sedation     3. May inject 1 % Lidocaine 0.1 ml Intradermal to numb IV site     4. Protime/INR if patient is on Coumadin     5. Urine Pregnancy Test (females only) - 12 -50 years     6. Accu Check Glucose if diabetic. Notify physician if <80 or >250.      7. Sedate all neurotomies          ______________________________________________________________________    POST-OPERATIVE ORDERS - DR. CHANG      1. Admit to Post Op Phase 2     2. Implement Standards of Care for Phase 2 Post Op     3. Check Site - May discharge when site is free of bleeding     4.  Discharge to home after meets Phase 2 criteria 5. Discharge cervical patients after 30 minutes and when meets Phase 2 criteria. 6. Give discharge instruction sheet     7. For Diabetic patient, if blood sugar less than 80 in preop,          Recheck blood sugar in Post Op. 8. Discontinue IV     9.  For Nausea may give Zofran 4 MG IV/IM/ODT           ______________________________________________________________________    Linsey Prabhakar     1955        3/12/20 1:25 PM

## 2020-03-12 NOTE — PROGRESS NOTES
loratadine-pseudoephedrine (ALLERGY RELIEF D-24)  MG per extended release tablet, TAKE ONE TABLET BY MOUTH DAILY, Disp: 90 tablet, Rfl: 3    potassium chloride (KLOR-CON) 20 MEQ packet, Take 20 mEq by mouth 2 times daily, Disp: , Rfl:     fluticasone (FLONASE) 50 MCG/ACT nasal spray, 2 sprays bilaterally qd, Disp: 3 Bottle, Rfl: 3    vitamin D 1000 units CAPS, Take by mouth, Disp: , Rfl:     aspirin 81 MG tablet, Take 81 mg by mouth daily, Disp: , Rfl:     Sennosides (SENNA LAXATIVE PO), Take by mouth, Disp: , Rfl:     MAGNESIUM PO, Take by mouth, Disp: , Rfl:     Calcium Carb-Cholecalciferol 600-500 MG-UNIT CAPS, Take 1,000 Units by mouth daily, Disp: , Rfl:     leuprolide (LUPRON) 11.25 MG injection, Inject 11.25 mg into the muscle once, Disp: , Rfl:     acetaminophen (TYLENOL) 500 MG tablet, Take 1 tablet by mouth every 6 hours as needed for Pain, Disp: 120 tablet, Rfl:   Allergies:  Cephalexin  Social History:    reports that he has never smoked. He has never used smokeless tobacco. He reports that he does not drink alcohol or use drugs. Family History:   Family History   Problem Relation Age of Onset    Diabetes Mother     Heart Disease Mother 79        2 vessel bypass    High Blood Pressure Mother     Alzheimer's Disease Mother     Arthritis Father     Heart Disease Father 68        stents    High Blood Pressure Father     Stroke Father     Heart Disease Paternal Uncle     Alzheimer's Disease Maternal Grandmother     Cancer Paternal Grandfather     Arthritis Paternal Grandfather        REVIEW OF SYSTEMS: Full ROS noted & scanned   CONSTITUTIONAL: Denies unexplained weight loss, fevers, chills or fatigue  NEUROLOGICAL: Denies unsteady gait or progressive weakness    PHYSICAL EXAM:    Vitals: Blood pressure (!) 141/74, pulse 76, height 5' 5.98\" (1.676 m), weight 184 lb 1.4 oz (83.5 kg).     GENERAL EXAM:  · General Apparence: Patient is adequately groomed with no evidence of clinically from lumbar spine. 2 views lumbar spine 1/31/2019 shows scoliosis with multilevel at least moderate DDD/spondylosis and facet arthropathy. Lateral view is skewed due to scoliotic curve. Arterial dopplers 1-23-19  Conclusions        Summary        1. Normal bilateral lower extremity arterial segmental evaluation. No    evidence for resting arterial insufficiency.    2. The patient's vessels suggest possible non-compliance with    non-compressible areas noted. Impression:  1) Chronic recurrent LBP, bilateral leg pain, neurogenic claudication   2) Scoliosis, L3 4 central stenosis  3) H/o prostate cancer  4) Negative vascular eval     Plan:   1) He wishes to proceed with midline L5-S1 LESI #1 new series. Procedure risk and benefits were discussed.   2) F/u after above       Leslye Anthony [facility]

## 2020-03-16 ENCOUNTER — TELEPHONE (OUTPATIENT)
Dept: ORTHOPEDIC SURGERY | Age: 65
End: 2020-03-16

## 2020-04-08 ENCOUNTER — TELEPHONE (OUTPATIENT)
Dept: FAMILY MEDICINE CLINIC | Age: 65
End: 2020-04-08

## 2020-04-13 ENCOUNTER — VIRTUAL VISIT (OUTPATIENT)
Dept: FAMILY MEDICINE CLINIC | Age: 65
End: 2020-04-13
Payer: MEDICARE

## 2020-04-13 PROCEDURE — 4040F PNEUMOC VAC/ADMIN/RCVD: CPT | Performed by: FAMILY MEDICINE

## 2020-04-13 PROCEDURE — 1123F ACP DISCUSS/DSCN MKR DOCD: CPT | Performed by: FAMILY MEDICINE

## 2020-04-13 PROCEDURE — 3017F COLORECTAL CA SCREEN DOC REV: CPT | Performed by: FAMILY MEDICINE

## 2020-04-13 PROCEDURE — 99443 PR PHYS/QHP TELEPHONE EVALUATION 21-30 MIN: CPT | Performed by: FAMILY MEDICINE

## 2020-04-13 RX ORDER — DONEPEZIL HYDROCHLORIDE 10 MG/1
10 TABLET, FILM COATED ORAL NIGHTLY
Qty: 90 TABLET | Refills: 3 | Status: SHIPPED | OUTPATIENT
Start: 2020-04-13 | End: 2021-05-10 | Stop reason: SDUPTHER

## 2020-04-13 RX ORDER — MULTIVIT WITH MINERALS/LUTEIN
2000 TABLET ORAL DAILY
COMMUNITY

## 2020-04-13 ASSESSMENT — PATIENT HEALTH QUESTIONNAIRE - PHQ9
SUM OF ALL RESPONSES TO PHQ QUESTIONS 1-9: 0
2. FEELING DOWN, DEPRESSED OR HOPELESS: 0
1. LITTLE INTEREST OR PLEASURE IN DOING THINGS: 0
SUM OF ALL RESPONSES TO PHQ QUESTIONS 1-9: 0
SUM OF ALL RESPONSES TO PHQ9 QUESTIONS 1 & 2: 0

## 2020-04-13 NOTE — PATIENT INSTRUCTIONS
Personalized Preventive Plan for Erick Gagnon - 4/13/2020  Medicare offers a range of preventive health benefits. Some of the tests and screenings are paid in full while other may be subject to a deductible, co-insurance, and/or copay. Some of these benefits include a comprehensive review of your medical history including lifestyle, illnesses that may run in your family, and various assessments and screenings as appropriate. After reviewing your medical record and screening and assessments performed today your provider may have ordered immunizations, labs, imaging, and/or referrals for you. A list of these orders (if applicable) as well as your Preventive Care list are included within your After Visit Summary for your review. Other Preventive Recommendations:    · A preventive eye exam performed by an eye specialist is recommended every 1-2 years to screen for glaucoma; cataracts, macular degeneration, and other eye disorders. · A preventive dental visit is recommended every 6 months. · Try to get at least 150 minutes of exercise per week or 10,000 steps per day on a pedometer . · Order or download the FREE \"Exercise & Physical Activity: Your Everyday Guide\" from [employer]. Call 9-750.468.1357 or search [employer] online. · You need 3389-9523 mg of calcium and 6142-6898 IU of vitamin D per day. It is possible to meet your calcium requirement with diet alone, but a vitamin D supplement is usually necessary to meet this goal.  · When exposed to the sun, use a sunscreen that protects against both UVA and UVB radiation with an SPF of 30 or greater. Reapply every 2 to 3 hours or after sweating, drying off with a towel, or swimming. · Always wear a seat belt when traveling in a car. Always wear a helmet when riding a bicycle or motorcycle.

## 2020-04-13 NOTE — PROGRESS NOTES
vit d level. Karrie Escamilla is a 75 y.o. male evaluated via telephone on 4/13/2020. Consent:  He and/or health care decision maker is aware that that he may receive a bill for this telephone service, depending on his insurance coverage, and has provided verbal consent to proceed: Yes      Documentation:  I communicated with the patient and/or health care decision maker about see above. Details of this discussion including any medical advice provided: see above      I affirm this is a Patient Initiated Episode with an Established Patient who has not had a related appointment within my department in the past 7 days or scheduled within the next 24 hours.     Total Time: minutes: 21-30 minutes    Note: not billable if this call serves to triage the patient into an appointment for the relevant concern      Jass Prajapati

## 2020-05-09 ENCOUNTER — HOSPITAL ENCOUNTER (OUTPATIENT)
Dept: GENERAL RADIOLOGY | Age: 65
Discharge: HOME | End: 2020-05-09
Payer: MEDICARE

## 2020-05-09 ENCOUNTER — HOSPITAL ENCOUNTER (OUTPATIENT)
Age: 65
Discharge: HOME | End: 2020-05-09
Payer: MEDICARE

## 2020-05-09 PROCEDURE — 74018 RADEX ABDOMEN 1 VIEW: CPT

## 2020-05-15 LAB — SARS-COV-2, PCR: NOT DETECTED

## 2020-05-15 NOTE — H&P
HISTORY AND PHYSICAL/PRE-SEDATION ASSESSMENT    Patient:  Greg Tracy   :  1955  Medical Record No.:  [id number]   Date:  2020  Physician:  Norman Jeffrey M.D. Facility: [facility]     Nursing History and Physical reviewed and agreed upon. Additional findings:    Allergies:  Cephalexin    Home Medications:    Prior to Admission medications    Medication Sig Start Date End Date Taking?  Authorizing Provider   Ascorbic Acid (VITAMIN C) 1000 MG tablet Take 1,000 mg by mouth daily    Historical Provider, MD   Denosumab (PROLIA SC) Inject into the skin every 6 months    Historical Provider, MD   donepezil (ARICEPT) 10 MG tablet Take 1 tablet by mouth nightly 20   Anali Portillo MD   meloxicam (MOBIC) 15 MG tablet TAKE 1 TABLET BY MOUTH ONCE DAILY 10/8/19   Anali Portillo MD   diclofenac sodium 1 % GEL Apply 2 g topically 4 times daily 19   Anali Portillo MD   Red Yeast Rice 600 MG TABS Take 600 mg by mouth 2 times daily    Historical Provider, MD   Turmeric 500 MG TABS Take 500 mg by mouth 2 times daily    Historical Provider, MD   Cinnamon 500 MG CAPS Take 500 mg by mouth 2 times daily    Historical Provider, MD   Omega-3 Fatty Acids (FISH OIL) 1000 MG CAPS Take 2,000 mg by mouth 2 times daily    Historical Provider, MD   loratadine-pseudoephedrine (ALLERGY RELIEF D-24)  MG per extended release tablet TAKE ONE TABLET BY MOUTH DAILY 7/3/19   Anali Portillo MD   potassium chloride (KLOR-CON) 20 MEQ packet Take 20 mEq by mouth 2 times daily    Historical Provider, MD   fluticasone [facility]) 50 MCG/ACT nasal spray 2 sprays bilaterally qd 18   Anali Portillo MD   vitamin D 1000 units CAPS Take by mouth    Historical Provider, MD   aspirin 81 MG tablet Take 81 mg by mouth daily    Historical Provider, MD   Sennosides (SENNA LAXATIVE PO) Take by mouth    Historical Provider, MD   MAGNESIUM PO Take by mouth    Historical Provider, MD   Calcium Carb-Cholecalciferol 600-500 MG-UNIT CAPS Take 1,000 Units by mouth daily 4/4/16   Alecia Hernandez MD   leuprolide (LUPRON) 11.25 MG injection Inject 11.25 mg into the muscle once    Historical Provider, MD   acetaminophen (TYLENOL) 500 MG tablet Take 1 tablet by mouth every 6 hours as needed for Pain 7/9/15   Alecia Hernandez MD       Vitals: Stable     PHYSICAL EXAM:  HENT: Airway patent and reviewed  Cardiovascular: Normal rate, regular rhythm, normal heart sounds. Pulmonary/Chest: No wheezes. No rhonchi. No rales. Abdominal: Soft. Bowel sounds are normal. No distension. Mallampati: 2      MALLAMPATI:           []   I. Complete visualization of the soft palate           [x]   II. Complete visualization of the uvula            []   III. Visualization of only the base of the uvula           []   IV. Soft palate is not visible     ASA CLASS:         []   I. Normal, healthy adult           [x]   II.  Mild systemic disease            []   III. Severe systemic disease      Sedation plan:   [x]  Local              []  Minimal                  []  General anesthesia    Patient's condition acceptable for planned procedure/sedation. Post Procedure Plan   Return to same level of care   ______________________     The risks and benefits as well as alternatives to the procedure have been discussed with the patient and or family. The patient and or next of kin understands and agrees to proceed.     Analilia Clay M.D.

## 2020-05-18 ENCOUNTER — HOSPITAL ENCOUNTER (OUTPATIENT)
Age: 65
Setting detail: OUTPATIENT SURGERY
Discharge: HOME | End: 2020-05-18
Attending: PHYSICAL MEDICINE & REHABILITATION | Admitting: PHYSICAL MEDICINE & REHABILITATION
Payer: MEDICARE

## 2020-05-18 VITALS
BODY MASS INDEX: 30.82 KG/M2 | SYSTOLIC BLOOD PRESSURE: 158 MMHG | RESPIRATION RATE: 16 BRPM | OXYGEN SATURATION: 100 % | HEIGHT: 65 IN | WEIGHT: 185 LBS | HEART RATE: 62 BPM | DIASTOLIC BLOOD PRESSURE: 99 MMHG | TEMPERATURE: 97.5 F

## 2020-05-18 PROCEDURE — 2709999900 HC NON-CHARGEABLE SUPPLY: Performed by: PHYSICAL MEDICINE & REHABILITATION

## 2020-05-18 PROCEDURE — 6360000002 HC RX W HCPCS: Performed by: PHYSICAL MEDICINE & REHABILITATION

## 2020-05-18 PROCEDURE — 2500000003 HC RX 250 WO HCPCS: Performed by: PHYSICAL MEDICINE & REHABILITATION

## 2020-05-18 PROCEDURE — 7100000011 HC PHASE II RECOVERY - ADDTL 15 MIN: Performed by: PHYSICAL MEDICINE & REHABILITATION

## 2020-05-18 PROCEDURE — 3600000002 HC SURGERY LEVEL 2 BASE: Performed by: PHYSICAL MEDICINE & REHABILITATION

## 2020-05-18 PROCEDURE — 7100000010 HC PHASE II RECOVERY - FIRST 15 MIN: Performed by: PHYSICAL MEDICINE & REHABILITATION

## 2020-05-18 PROCEDURE — 6360000004 HC RX CONTRAST MEDICATION: Performed by: PHYSICAL MEDICINE & REHABILITATION

## 2020-05-18 RX ORDER — METHYLPREDNISOLONE ACETATE 40 MG/ML
INJECTION, SUSPENSION INTRA-ARTICULAR; INTRALESIONAL; INTRAMUSCULAR; SOFT TISSUE
Status: DISCONTINUED
Start: 2020-05-18 | End: 2020-05-18 | Stop reason: HOSPADM

## 2020-05-18 RX ORDER — NITROFURANTOIN MACROCRYSTALS 100 MG/1
100 CAPSULE ORAL DAILY
COMMUNITY
End: 2020-12-23

## 2020-05-18 RX ORDER — LIDOCAINE HYDROCHLORIDE 10 MG/ML
INJECTION, SOLUTION EPIDURAL; INFILTRATION; INTRACAUDAL; PERINEURAL PRN
Status: DISCONTINUED | OUTPATIENT
Start: 2020-05-18 | End: 2020-05-18 | Stop reason: ALTCHOICE

## 2020-05-18 ASSESSMENT — PAIN DESCRIPTION - DESCRIPTORS: DESCRIPTORS: SHARP

## 2020-05-18 NOTE — OP NOTE
Patient:  Amaris Haas   Medical Record #:  [id number]   Date:  5/18/2020  Physician:  Oren Man M.D. Facility: [facility]     Pre-op diagnosis: Lumbar spondylosis, lumbar radiculitis  Post-op diagnosis:  same  Procedure: Right L5/S1 interlaminar epidural injection with flouroscopic guidance  Anesthesia: Local  Procedure Note:    The patient was admitted through pre-op and written consent was obtained. The patient was advised of the risks and benefits of the procedure, including but not limited to the following: bleeding, pain, infection, temporary paralysis, nerve damage and spinal headache. The patient was given the opportunity to ask questions. There were no contraindications for this procedure. The appropriate area was prepped and draped in a sterile fashion. Landmarks were identified and marked. The skin and soft tissues were anesthetized with 1% lidocaine. A 20G Touhy needle was advanced to the right L5/S1 interlaminar space using fluoroscopic guidance with ideal needle tip placement confirmed by multiple views. Injection of contrast showed epidural flow. There were no signs of intravascular or intrathecal injection. 80 mg depomedrol and 1cc 1% lidocaine were then injected. There were no complications and the patient tolerated the procedure well. The patient was transferred to the recovery area and monitored. Discharge instructions were given. The patient is to contact me for any post-procedure concerns. The patient is to follow up as scheduled.     LYNN: 2cm     Estimated blood loss: none    F Denette Homans, MD

## 2020-05-20 ENCOUNTER — HOSPITAL ENCOUNTER (OUTPATIENT)
Dept: CT IMAGING | Age: 65
Discharge: HOME | End: 2020-05-20
Payer: MEDICARE

## 2020-05-20 ENCOUNTER — HOSPITAL ENCOUNTER (OUTPATIENT)
Age: 65
Discharge: HOME | End: 2020-05-20
Payer: MEDICARE

## 2020-05-20 LAB
A/G RATIO: 1.5 (ref 1.1–2.2)
ALBUMIN SERPL-MCNC: 4.8 G/DL (ref 3.4–5)
ALP BLD-CCNC: 88 U/L (ref 40–129)
ALT SERPL-CCNC: 16 U/L (ref 10–40)
ANION GAP SERPL CALCULATED.3IONS-SCNC: 14 MMOL/L (ref 3–16)
AST SERPL-CCNC: 19 U/L (ref 15–37)
BILIRUB SERPL-MCNC: 0.3 MG/DL (ref 0–1)
BUN BLDV-MCNC: 27 MG/DL (ref 7–20)
CALCIUM SERPL-MCNC: 10.1 MG/DL (ref 8.3–10.6)
CHLORIDE BLD-SCNC: 100 MMOL/L (ref 99–110)
CO2: 24 MMOL/L (ref 21–32)
CREAT SERPL-MCNC: 1.3 MG/DL (ref 0.8–1.3)
GFR AFRICAN AMERICAN: >60
GFR NON-AFRICAN AMERICAN: 55
GLOBULIN: 3.3 G/DL
GLUCOSE BLD-MCNC: 115 MG/DL (ref 70–99)
POTASSIUM SERPL-SCNC: 4.5 MMOL/L (ref 3.5–5.1)
SODIUM BLD-SCNC: 138 MMOL/L (ref 136–145)
TOTAL PROTEIN: 8.1 G/DL (ref 6.4–8.2)

## 2020-05-20 PROCEDURE — 84403 ASSAY OF TOTAL TESTOSTERONE: CPT

## 2020-05-20 PROCEDURE — 83036 HEMOGLOBIN GLYCOSYLATED A1C: CPT

## 2020-05-20 PROCEDURE — 6360000004 HC RX CONTRAST MEDICATION: Performed by: UROLOGY

## 2020-05-20 PROCEDURE — 84153 ASSAY OF PSA TOTAL: CPT

## 2020-05-20 PROCEDURE — 80053 COMPREHEN METABOLIC PANEL: CPT

## 2020-05-20 PROCEDURE — 74178 CT ABD&PLV WO CNTR FLWD CNTR: CPT

## 2020-05-20 PROCEDURE — 84270 ASSAY OF SEX HORMONE GLOBUL: CPT

## 2020-05-20 PROCEDURE — 82306 VITAMIN D 25 HYDROXY: CPT

## 2020-05-20 RX ADMIN — IOPAMIDOL 75 ML: 755 INJECTION, SOLUTION INTRAVENOUS at 23:08

## 2020-05-21 LAB
ESTIMATED AVERAGE GLUCOSE: 128.4 MG/DL
HBA1C MFR BLD: 6.1 %
PROSTATE SPECIFIC ANTIGEN: <0.01 NG/ML (ref 0–4)
VITAMIN D 25-HYDROXY: 44.6 NG/ML

## 2020-05-24 ENCOUNTER — HOSPITAL ENCOUNTER (OUTPATIENT)
Age: 65
Discharge: HOME | End: 2020-05-24
Payer: MEDICARE

## 2020-05-24 LAB
CHOLESTEROL, TOTAL: 209 MG/DL (ref 0–199)
HDLC SERPL-MCNC: 84 MG/DL (ref 40–60)
LDL CHOLESTEROL CALCULATED: 111 MG/DL
SEX HORMONE BINDING GLOBULIN: 45 NMOL/L (ref 11–80)
TESTOSTERONE FREE-NONMALE: ABNORMAL PG/ML (ref 47–244)
TESTOSTERONE TOTAL: <3 NG/DL (ref 220–1000)
TRIGL SERPL-MCNC: 72 MG/DL (ref 0–150)
VLDLC SERPL CALC-MCNC: 14 MG/DL

## 2020-05-24 PROCEDURE — 36415 COLL VENOUS BLD VENIPUNCTURE: CPT

## 2020-05-24 PROCEDURE — 80061 LIPID PANEL: CPT

## 2020-06-02 ENCOUNTER — VIRTUAL VISIT (OUTPATIENT)
Dept: ORTHOPEDIC SURGERY | Age: 65
End: 2020-06-02
Payer: MEDICARE

## 2020-06-02 ENCOUNTER — TELEPHONE (OUTPATIENT)
Dept: ORTHOPEDIC SURGERY | Age: 65
End: 2020-06-02

## 2020-06-02 VITALS — WEIGHT: 184.97 LBS | HEIGHT: 65 IN | BODY MASS INDEX: 30.82 KG/M2

## 2020-06-02 PROCEDURE — 1036F TOBACCO NON-USER: CPT | Performed by: PHYSICIAN ASSISTANT

## 2020-06-02 PROCEDURE — G8427 DOCREV CUR MEDS BY ELIG CLIN: HCPCS | Performed by: PHYSICIAN ASSISTANT

## 2020-06-02 PROCEDURE — 4040F PNEUMOC VAC/ADMIN/RCVD: CPT | Performed by: PHYSICIAN ASSISTANT

## 2020-06-02 PROCEDURE — 1123F ACP DISCUSS/DSCN MKR DOCD: CPT | Performed by: PHYSICIAN ASSISTANT

## 2020-06-02 PROCEDURE — 3017F COLORECTAL CA SCREEN DOC REV: CPT | Performed by: PHYSICIAN ASSISTANT

## 2020-06-02 PROCEDURE — 99213 OFFICE O/P EST LOW 20 MIN: CPT | Performed by: PHYSICIAN ASSISTANT

## 2020-06-02 PROCEDURE — G8417 CALC BMI ABV UP PARAM F/U: HCPCS | Performed by: PHYSICIAN ASSISTANT

## 2020-06-02 NOTE — PROGRESS NOTES
SURGERY Right 10/15/13    RT KNEE PARTIAL MEDIAL, LATERAL MENISECTOMY AND CHONDROPLASTY WITH REMOVAL OF LOSSE HARDWARE    KNEE SURGERY Right 10/15/13    RT KNEE ARTHROSCOPY PARTIAL LATERAL AND MEDIAL MENISECTOMY WITH CHONDROPLASTY AND REMOVAL OF LOOSE BODIES    PAIN MANAGEMENT PROCEDURE N/A 5/18/2020    MIDLINE LUMBAR FIVE SACRAL ONE EPIDURAL STEROID INJECTION SITE CONFIRMED BY FLUOROSCOPY performed by Chapis Victor MD at [facility]  07/17/15    DaVinci prostatectomy    SHOULDER SURGERY       Current Medications:     Current Outpatient Medications:     nitrofurantoin (MACRODANTIN) 100 MG capsule, Take 100 mg by mouth daily, Disp: , Rfl:     Ascorbic Acid (VITAMIN C) 1000 MG tablet, Take 1,000 mg by mouth daily, Disp: , Rfl:     Denosumab (PROLIA SC), Inject into the skin every 6 months, Disp: , Rfl:     donepezil (ARICEPT) 10 MG tablet, Take 1 tablet by mouth nightly, Disp: 90 tablet, Rfl: 3    meloxicam (MOBIC) 15 MG tablet, TAKE 1 TABLET BY MOUTH ONCE DAILY, Disp: 45 tablet, Rfl: 0    diclofenac sodium 1 % GEL, Apply 2 g topically 4 times daily, Disp: 2 Tube, Rfl: 1    Red Yeast Rice 600 MG TABS, Take 600 mg by mouth 2 times daily, Disp: , Rfl:     Turmeric 500 MG TABS, Take 500 mg by mouth 2 times daily, Disp: , Rfl:     Cinnamon 500 MG CAPS, Take 500 mg by mouth 2 times daily, Disp: , Rfl:     Omega-3 Fatty Acids (FISH OIL) 1000 MG CAPS, Take 2,000 mg by mouth 2 times daily, Disp: , Rfl:     loratadine-pseudoephedrine (ALLERGY RELIEF D-24)  MG per extended release tablet, TAKE ONE TABLET BY MOUTH DAILY, Disp: 90 tablet, Rfl: 3    potassium chloride (KLOR-CON) 20 MEQ packet, Take 20 mEq by mouth 2 times daily, Disp: , Rfl:     fluticasone (FLONASE) 50 MCG/ACT nasal spray, 2 sprays bilaterally qd, Disp: 3 Bottle, Rfl: 3    vitamin D 1000 units CAPS, Take by mouth, Disp: , Rfl:     aspirin 81 MG tablet, Take 81 mg by mouth daily, Disp: , Rfl:     Sennosides

## 2020-07-20 ENCOUNTER — APPOINTMENT (OUTPATIENT)
Dept: GENERAL RADIOLOGY | Age: 65
End: 2020-07-20
Payer: MEDICARE

## 2020-07-20 ENCOUNTER — HOSPITAL ENCOUNTER (EMERGENCY)
Age: 65
Discharge: HOME | End: 2020-07-20
Attending: EMERGENCY MEDICINE
Payer: MEDICARE

## 2020-07-20 VITALS
SYSTOLIC BLOOD PRESSURE: 128 MMHG | WEIGHT: 185 LBS | HEART RATE: 82 BPM | HEIGHT: 65 IN | BODY MASS INDEX: 30.82 KG/M2 | TEMPERATURE: 98.7 F | DIASTOLIC BLOOD PRESSURE: 88 MMHG | RESPIRATION RATE: 16 BRPM | OXYGEN SATURATION: 99 %

## 2020-07-20 LAB
A/G RATIO: 1.7 (ref 1.1–2.2)
ALBUMIN SERPL-MCNC: 4.8 G/DL (ref 3.4–5)
ALP BLD-CCNC: 81 U/L (ref 40–129)
ALT SERPL-CCNC: 24 U/L (ref 10–40)
ANION GAP SERPL CALCULATED.3IONS-SCNC: 12 MMOL/L (ref 3–16)
AST SERPL-CCNC: 24 U/L (ref 15–37)
BASOPHILS ABSOLUTE: 0.1 K/UL (ref 0–0.2)
BASOPHILS RELATIVE PERCENT: 0.8 %
BILIRUB SERPL-MCNC: 0.5 MG/DL (ref 0–1)
BUN BLDV-MCNC: 21 MG/DL (ref 7–20)
CALCIUM SERPL-MCNC: 10.4 MG/DL (ref 8.3–10.6)
CHLORIDE BLD-SCNC: 105 MMOL/L (ref 99–110)
CO2: 22 MMOL/L (ref 21–32)
CREAT SERPL-MCNC: 1.1 MG/DL (ref 0.8–1.3)
EKG ATRIAL RATE: 70 BPM
EKG DIAGNOSIS: NORMAL
EKG P AXIS: 48 DEGREES
EKG P-R INTERVAL: 140 MS
EKG Q-T INTERVAL: 432 MS
EKG QRS DURATION: 94 MS
EKG QTC CALCULATION (BAZETT): 466 MS
EKG R AXIS: 5 DEGREES
EKG T AXIS: 27 DEGREES
EKG VENTRICULAR RATE: 70 BPM
EOSINOPHILS ABSOLUTE: 0.4 K/UL (ref 0–0.6)
EOSINOPHILS RELATIVE PERCENT: 4.6 %
GFR AFRICAN AMERICAN: >60
GFR NON-AFRICAN AMERICAN: >60
GLOBULIN: 2.8 G/DL
GLUCOSE BLD-MCNC: 104 MG/DL (ref 70–99)
HCT VFR BLD CALC: 47.1 % (ref 40.5–52.5)
HEMOGLOBIN: 16 G/DL (ref 13.5–17.5)
LYMPHOCYTES ABSOLUTE: 1.9 K/UL (ref 1–5.1)
LYMPHOCYTES RELATIVE PERCENT: 20.4 %
MCH RBC QN AUTO: 31.9 PG (ref 26–34)
MCHC RBC AUTO-ENTMCNC: 34 G/DL (ref 31–36)
MCV RBC AUTO: 93.9 FL (ref 80–100)
MONOCYTES ABSOLUTE: 0.8 K/UL (ref 0–1.3)
MONOCYTES RELATIVE PERCENT: 8.5 %
NEUTROPHILS ABSOLUTE: 6.1 K/UL (ref 1.7–7.7)
NEUTROPHILS RELATIVE PERCENT: 65.7 %
PDW BLD-RTO: 13.3 % (ref 12.4–15.4)
PLATELET # BLD: 235 K/UL (ref 135–450)
PMV BLD AUTO: 7.6 FL (ref 5–10.5)
POTASSIUM SERPL-SCNC: 4 MMOL/L (ref 3.5–5.1)
RBC # BLD: 5.01 M/UL (ref 4.2–5.9)
SODIUM BLD-SCNC: 139 MMOL/L (ref 136–145)
TOTAL CK: 353 U/L (ref 39–308)
TOTAL PROTEIN: 7.6 G/DL (ref 6.4–8.2)
TROPONIN: <0.01 NG/ML
TROPONIN: <0.01 NG/ML
WBC # BLD: 9.3 K/UL (ref 4–11)

## 2020-07-20 PROCEDURE — 82550 ASSAY OF CK (CPK): CPT

## 2020-07-20 PROCEDURE — 2500000003 HC RX 250 WO HCPCS: Performed by: EMERGENCY MEDICINE

## 2020-07-20 PROCEDURE — 85025 COMPLETE CBC W/AUTO DIFF WBC: CPT

## 2020-07-20 PROCEDURE — 99284 EMERGENCY DEPT VISIT MOD MDM: CPT

## 2020-07-20 PROCEDURE — 84484 ASSAY OF TROPONIN QUANT: CPT

## 2020-07-20 PROCEDURE — 6360000002 HC RX W HCPCS: Performed by: EMERGENCY MEDICINE

## 2020-07-20 PROCEDURE — 96375 TX/PRO/DX INJ NEW DRUG ADDON: CPT

## 2020-07-20 PROCEDURE — 36415 COLL VENOUS BLD VENIPUNCTURE: CPT

## 2020-07-20 PROCEDURE — 80053 COMPREHEN METABOLIC PANEL: CPT

## 2020-07-20 PROCEDURE — 2580000003 HC RX 258: Performed by: EMERGENCY MEDICINE

## 2020-07-20 PROCEDURE — 93005 ELECTROCARDIOGRAM TRACING: CPT | Performed by: EMERGENCY MEDICINE

## 2020-07-20 PROCEDURE — 93010 ELECTROCARDIOGRAM REPORT: CPT | Performed by: INTERNAL MEDICINE

## 2020-07-20 PROCEDURE — 71045 X-RAY EXAM CHEST 1 VIEW: CPT

## 2020-07-20 PROCEDURE — 6370000000 HC RX 637 (ALT 250 FOR IP): Performed by: EMERGENCY MEDICINE

## 2020-07-20 PROCEDURE — 96374 THER/PROPH/DIAG INJ IV PUSH: CPT

## 2020-07-20 PROCEDURE — 73030 X-RAY EXAM OF SHOULDER: CPT

## 2020-07-20 RX ORDER — ASPIRIN 81 MG/1
324 TABLET, CHEWABLE ORAL ONCE
Status: COMPLETED | OUTPATIENT
Start: 2020-07-20 | End: 2020-07-20

## 2020-07-20 RX ORDER — NITROGLYCERIN 0.4 MG/1
0.4 TABLET SUBLINGUAL EVERY 5 MIN PRN
Status: DISCONTINUED | OUTPATIENT
Start: 2020-07-20 | End: 2020-07-20 | Stop reason: HOSPADM

## 2020-07-20 RX ORDER — OXYCODONE HYDROCHLORIDE AND ACETAMINOPHEN 5; 325 MG/1; MG/1
1 TABLET ORAL EVERY 6 HOURS PRN
Qty: 12 TABLET | Refills: 0 | Status: SHIPPED | OUTPATIENT
Start: 2020-07-20 | End: 2020-07-23

## 2020-07-20 RX ORDER — KETOROLAC TROMETHAMINE 30 MG/ML
15 INJECTION, SOLUTION INTRAMUSCULAR; INTRAVENOUS ONCE
Status: COMPLETED | OUTPATIENT
Start: 2020-07-20 | End: 2020-07-20

## 2020-07-20 RX ORDER — ONDANSETRON 2 MG/ML
4 INJECTION INTRAMUSCULAR; INTRAVENOUS ONCE
Status: COMPLETED | OUTPATIENT
Start: 2020-07-20 | End: 2020-07-20

## 2020-07-20 RX ORDER — 0.9 % SODIUM CHLORIDE 0.9 %
500 INTRAVENOUS SOLUTION INTRAVENOUS ONCE
Status: COMPLETED | OUTPATIENT
Start: 2020-07-20 | End: 2020-07-20

## 2020-07-20 RX ORDER — MORPHINE SULFATE 2 MG/ML
2 INJECTION, SOLUTION INTRAMUSCULAR; INTRAVENOUS ONCE
Status: COMPLETED | OUTPATIENT
Start: 2020-07-20 | End: 2020-07-20

## 2020-07-20 RX ADMIN — ONDANSETRON 4 MG: 2 INJECTION INTRAMUSCULAR; INTRAVENOUS at 17:03

## 2020-07-20 RX ADMIN — ASPIRIN 81 MG 324 MG: 81 TABLET ORAL at 16:39

## 2020-07-20 RX ADMIN — NITROGLYCERIN 0.4 MG: 0.4 TABLET SUBLINGUAL at 16:39

## 2020-07-20 RX ADMIN — NITROGLYCERIN 0.5 INCH: 20 OINTMENT TOPICAL at 17:03

## 2020-07-20 RX ADMIN — NITROGLYCERIN 0.4 MG: 0.4 TABLET SUBLINGUAL at 16:45

## 2020-07-20 RX ADMIN — SODIUM CHLORIDE 500 ML: 9 INJECTION, SOLUTION INTRAVENOUS at 16:40

## 2020-07-20 RX ADMIN — HYDROMORPHONE HYDROCHLORIDE 0.5 MG: 1 INJECTION, SOLUTION INTRAMUSCULAR; INTRAVENOUS; SUBCUTANEOUS at 17:57

## 2020-07-20 RX ADMIN — MORPHINE SULFATE 2 MG: 2 INJECTION, SOLUTION INTRAMUSCULAR; INTRAVENOUS at 17:03

## 2020-07-20 RX ADMIN — KETOROLAC TROMETHAMINE 15 MG: 30 INJECTION, SOLUTION INTRAMUSCULAR at 17:52

## 2020-07-20 ASSESSMENT — PAIN SCALES - GENERAL
PAINLEVEL_OUTOF10: 8
PAINLEVEL_OUTOF10: 2
PAINLEVEL_OUTOF10: 7
PAINLEVEL_OUTOF10: 6
PAINLEVEL_OUTOF10: 6

## 2020-07-20 ASSESSMENT — ENCOUNTER SYMPTOMS
BACK PAIN: 1
VOMITING: 0
NAUSEA: 0
DIARRHEA: 0
SHORTNESS OF BREATH: 0
SORE THROAT: 0
ABDOMINAL PAIN: 0
COUGH: 0

## 2020-07-20 ASSESSMENT — PAIN DESCRIPTION - ORIENTATION
ORIENTATION: LEFT
ORIENTATION: LEFT;UPPER

## 2020-07-20 ASSESSMENT — PAIN DESCRIPTION - PAIN TYPE
TYPE: ACUTE PAIN

## 2020-07-20 ASSESSMENT — PAIN DESCRIPTION - LOCATION
LOCATION: ARM
LOCATION: ARM;SHOULDER
LOCATION: ARM
LOCATION: ARM

## 2020-07-20 ASSESSMENT — PAIN DESCRIPTION - DIRECTION: RADIATING_TOWARDS: SHOULDER

## 2020-07-20 ASSESSMENT — PAIN DESCRIPTION - DESCRIPTORS: DESCRIPTORS: OTHER (COMMENT)

## 2020-07-20 ASSESSMENT — PAIN DESCRIPTION - FREQUENCY: FREQUENCY: CONTINUOUS

## 2020-07-20 ASSESSMENT — PAIN DESCRIPTION - PROGRESSION: CLINICAL_PROGRESSION: GRADUALLY WORSENING

## 2020-07-20 NOTE — ED NOTES
Bed: 04  Expected date:   Expected time:   Means of arrival:   Comments:  shoaib Stewart RN  07/20/20 [id number]

## 2020-07-21 RX ORDER — LORATADINE AND PSEUDOEPHEDRINE SULFATE 10; 240 MG/1; MG/1
TABLET, FILM COATED, EXTENDED RELEASE ORAL
Qty: 90 TABLET | Refills: 3 | Status: SHIPPED | OUTPATIENT
Start: 2020-07-21 | End: 2021-07-26 | Stop reason: SDUPTHER

## 2020-07-21 NOTE — TELEPHONE ENCOUNTER
Pt calling he says he was in the ER last night for side effects from Prolia injection. Does he need to come in for an appt? He said you talked to the ER doc last night about this. Also pt needs refill on pending medication.

## 2020-07-21 NOTE — TELEPHONE ENCOUNTER
----- Message from [employer] sent at 7/21/2020  8:22 AM EDT -----  Subject: Appointment Request    QUESTIONS  Reason for appointment request? Available appointments did not meet   patient need  ---------------------------------------------------------------------------  --------------  CALL BACK INFO  What is the best way for the office to contact you? OK to leave message on   voicemail  Preferred Call Back Phone Number? [phone]  ---------------------------------------------------------------------------  --------------  SCRIPT ANSWERS  Relationship to Patient? Self  Appointment reason? Well Care/Follow Ups  Select a Well Care/Follow Ups appointment reason? Adult Hospital Follow Up   [facility] Discharge]  (Patient requests to see provider urgently. )? No  (Has the patient been discharged from the hospital within 2 business days   AND does not have a Telephone Encounter  Follow Up From [address] [place] [address] in [employer]?)? No  Do you have any questions for your primary care provider that need to be   answered prior to your appointment? (Use RN Triage if question pertains to   anything on the red flag list)? No  (Patient needs follow up visit after hospital discharge) Book first   available appointment within 7 days OF DISCHARGE   if no appt   proceed to book the next available time slot within 14 days RAFAEL Candelaria 9 Message to Provider. 32-[facility] Follow Up appointment cannot be   booked beyond 14 Days and should result in a Message to Provider. ? Yes  Have you been diagnosed with   tested for   or told that you are suspected of having COVID-19 (Coronavirus)? No  Have you had a fever or taken medication to treat a fever within the past   3 days? No  Have you had a cough   shortness of breath or flu-like symptoms within the past 3 days?  No  Do you currently have flu-like symptoms including fever or chills   cough   shortness of breath   or difficulty breathing   or new loss of taste or smell? No  (Service Expert  click yes below to proceed with [employer] As Usual   Scheduling)?  Yes

## 2020-07-21 NOTE — PROGRESS NOTES
I spoke w/ ER doc yesterday.   Will likely need f/u later this week w/ me or navneet if still w/ pain

## 2020-07-24 ENCOUNTER — OFFICE VISIT (OUTPATIENT)
Dept: FAMILY MEDICINE CLINIC | Age: 65
End: 2020-07-24
Payer: MEDICARE

## 2020-07-24 VITALS
HEART RATE: 82 BPM | HEIGHT: 65 IN | TEMPERATURE: 97.2 F | DIASTOLIC BLOOD PRESSURE: 98 MMHG | SYSTOLIC BLOOD PRESSURE: 150 MMHG | BODY MASS INDEX: 31.89 KG/M2 | OXYGEN SATURATION: 96 % | WEIGHT: 191.4 LBS

## 2020-07-24 PROCEDURE — 3017F COLORECTAL CA SCREEN DOC REV: CPT | Performed by: FAMILY MEDICINE

## 2020-07-24 PROCEDURE — 1036F TOBACCO NON-USER: CPT | Performed by: FAMILY MEDICINE

## 2020-07-24 PROCEDURE — 4040F PNEUMOC VAC/ADMIN/RCVD: CPT | Performed by: FAMILY MEDICINE

## 2020-07-24 PROCEDURE — 99213 OFFICE O/P EST LOW 20 MIN: CPT | Performed by: FAMILY MEDICINE

## 2020-07-24 PROCEDURE — G8417 CALC BMI ABV UP PARAM F/U: HCPCS | Performed by: FAMILY MEDICINE

## 2020-07-24 PROCEDURE — G8427 DOCREV CUR MEDS BY ELIG CLIN: HCPCS | Performed by: FAMILY MEDICINE

## 2020-07-24 PROCEDURE — 1123F ACP DISCUSS/DSCN MKR DOCD: CPT | Performed by: FAMILY MEDICINE

## 2020-07-24 NOTE — PROGRESS NOTES
Socioeconomic History    Marital status:      Spouse name: Not on file    Number of children: Not on file    Years of education: Not on file    Highest education level: Not on file   Occupational History    Not on file   Social Needs    Financial resource strain: Not on file    Food insecurity     Worry: Not on file     Inability: Not on file    Transportation needs     Medical: Not on file     Non-medical: Not on file   Tobacco Use    Smoking status: Never Smoker    Smokeless tobacco: Never Used    Tobacco comment: no history smoking, used to raise a lot of tobacco   Substance and Sexual Activity    Alcohol use: Yes     Alcohol/week: 1.0 standard drinks     Types: 1 Shots of liquor per week     Comment: nightly    Drug use: No    Sexual activity: Yes     Partners: Female   Lifestyle    Physical activity     Days per week: Not on file     Minutes per session: Not on file    Stress: Not on file   Relationships    Social connections     Talks on phone: Not on file     Gets together: Not on file     Attends Muslim service: Not on file     Active member of club or organization: Not on file     Attends meetings of clubs or organizations: Not on file     Relationship status: Not on file    Intimate partner violence     Fear of current or ex partner: Not on file     Emotionally abused: Not on file     Physically abused: Not on file     Forced sexual activity: Not on file   Other Topics Concern    Not on file   Social History Narrative    Not on file       Prior to Visit Medications    Medication Sig Taking?  Authorizing Provider   loratadine-pseudoephedrine (ALLERGY RELIEF D-24)  MG per extended release tablet TAKE ONE TABLET BY MOUTH DAILY Yes Tiago Arreola MD   nitrofurantoin (MACRODANTIN) 100 MG capsule Take 100 mg by mouth daily Yes Historical Provider, MD   Ascorbic Acid (VITAMIN C) 1000 MG tablet Take 1,000 mg by mouth daily Yes Historical Provider, MD   Denosumab (PROLIA SC) Inject into the skin every 6 months Yes Historical Provider, MD   donepezil (ARICEPT) 10 MG tablet Take 1 tablet by mouth nightly Yes Pal Solares MD   meloxicam (MOBIC) 15 MG tablet TAKE 1 TABLET BY MOUTH ONCE DAILY Yes Pal Solares MD   diclofenac sodium 1 % GEL Apply 2 g topically 4 times daily Yes Pal Solares MD   Red Yeast Rice 600 MG TABS Take 600 mg by mouth 2 times daily Yes Historical Provider, MD   Turmeric 500 MG TABS Take 500 mg by mouth 2 times daily Yes Historical Provider, MD   Cinnamon 500 MG CAPS Take 500 mg by mouth 2 times daily Yes Historical Provider, MD   Omega-3 Fatty Acids (FISH OIL) 1000 MG CAPS Take 1,000 mg by mouth 2 times daily  Yes Historical Provider, MD   POTASSIUM CHLORIDE PO Take by mouth daily 99 mg takes 1/2 tab unless having cramps then takes whole tab.  Yes Historical Provider, MD   fluticasone (FLONASE) 50 MCG/ACT nasal spray 2 sprays bilaterally qd Yes Pal Solares MD   vitamin D 1000 units CAPS Take by mouth Yes Historical Provider, MD   Sennosides (SENNA LAXATIVE PO) Take by mouth Yes Historical Provider, MD   MAGNESIUM PO Take by mouth 2 times daily  Yes Historical Provider, MD   Calcium Carb-Cholecalciferol 600-500 MG-UNIT CAPS Take 1,000 Units by mouth daily Yes Concepcion Doe MD   leuprolide (LUPRON) 11.25 MG injection Inject 11.25 mg into the muscle every 6 months  Yes Historical Provider, MD   acetaminophen (TYLENOL) 500 MG tablet Take 1 tablet by mouth every 6 hours as needed for Pain Yes Concepcion Doe MD       Allergies   Allergen Reactions    Cephalexin Hives       OBJECTIVE:    BP (!) 150/98   Pulse 82   Temp 97.2 °F (36.2 °C)   Ht 5' 5\" (1.651 m)   Wt 191 lb 6.4 oz (86.8 kg)   SpO2 96%   BMI 31.85 kg/m²     BP Readings from Last 2 Encounters:   07/24/20 (!) 150/98   07/20/20 128/88       Wt Readings from Last 3 Encounters:   07/24/20 191 lb 6.4 oz (86.8 kg)   07/20/20 185 lb (83.9 kg)   06/02/20 184 lb 15.5 oz (83.9 kg)       Physical Exam  Constitutional:       Appearance: Normal appearance. HENT:      Head: Normocephalic and atraumatic. Eyes:      Extraocular Movements: Extraocular movements intact. Neck:     Cardiovascular:      Rate and Rhythm: Normal rate and regular rhythm. Pulmonary:      Effort: Pulmonary effort is normal.      Breath sounds: Normal breath sounds. Neurological:      General: No focal deficit present. Mental Status: He is alert and oriented to person, place, and time. Psychiatric:         Mood and Affect: Mood normal.         Behavior: Behavior normal.           ASSESSMENT/PLAN:    1. Left arm pain  Pain has significantly improved with chiropractic treatment. He is not required any additional medication. This seems to be more of a musculoskeletal issue and may be related to position changes, recent activity, and possibly some underlying neck issues. Continue chiropractic care at this time. Should his symptoms recur, consider MRI of the cervical spine given the radicular symptoms down the left arm. This does not seem to be related to the Prolia given the rapid improvement with chiropractic care    2. Spinal stenosis of lumbar region, unspecified whether neurogenic claudication present  Follow-up with specialist as scheduled    3. Chronic pain of both knees  Considering orthopedic referral.  Patient will let us know when he is ready for that. This document was prepared by a combination of typing and transcription through a voice recognition software.

## 2020-08-19 ENCOUNTER — TELEPHONE (OUTPATIENT)
Dept: ORTHOPEDIC SURGERY | Age: 65
End: 2020-08-19

## 2020-08-19 ENCOUNTER — OFFICE VISIT (OUTPATIENT)
Dept: ORTHOPEDIC SURGERY | Age: 65
End: 2020-08-19
Payer: MEDICARE

## 2020-08-19 VITALS — BODY MASS INDEX: 31.82 KG/M2 | HEIGHT: 65 IN | WEIGHT: 191 LBS

## 2020-08-19 PROCEDURE — 99214 OFFICE O/P EST MOD 30 MIN: CPT | Performed by: PHYSICAL MEDICINE & REHABILITATION

## 2020-08-19 PROCEDURE — 1036F TOBACCO NON-USER: CPT | Performed by: PHYSICAL MEDICINE & REHABILITATION

## 2020-08-19 PROCEDURE — G8417 CALC BMI ABV UP PARAM F/U: HCPCS | Performed by: PHYSICAL MEDICINE & REHABILITATION

## 2020-08-19 PROCEDURE — 1123F ACP DISCUSS/DSCN MKR DOCD: CPT | Performed by: PHYSICAL MEDICINE & REHABILITATION

## 2020-08-19 PROCEDURE — 3017F COLORECTAL CA SCREEN DOC REV: CPT | Performed by: PHYSICAL MEDICINE & REHABILITATION

## 2020-08-19 PROCEDURE — 4040F PNEUMOC VAC/ADMIN/RCVD: CPT | Performed by: PHYSICAL MEDICINE & REHABILITATION

## 2020-08-19 PROCEDURE — G8427 DOCREV CUR MEDS BY ELIG CLIN: HCPCS | Performed by: PHYSICAL MEDICINE & REHABILITATION

## 2020-08-19 NOTE — LETTER
[address] [facility]    Please Schedule the following with: Dr. Casimiro Segovia    Date:  8/19/20     Patient: Francesco Robledo     YOB: 1955    Patient Home Phone: [phone] (home)    Diagnosis: Lumbar spondylosis M47.816,  Stenosis M48.062    []LT     []RT     [x]RADHA     []Midline    Levels: Bilateral L3-4-5 medial branch blocks #1 of 2    []Cervical AMPARO 46308, 24605  [x]L-MBB 90650, 18577  []SI Joint 43444   []C-FACET 25122, 30994, 07557  []L-FACET 40456, 50020  []Interlaminar AMPARO 37943     []HIP 98251    []C-MBB  []Transforaminal AMPARO 59069  []Neurotomy 31049, 51230, 68206    Attending Physician: Nessa Riojas    Injection Schedule for: 8/24/2020 AT 845AM     At: [facility]    First Insurance: MEDICARE                                    Pre-cert #:  Second Insurance:                 Pre-cert #:    [id number]:9/18/14 Midline L5/S1 interlaminar epidural injection #2--95%  2/26/19 Midline L5/S1 interlaminar epidural injection #1--90% IMPROVED   10/22/19 Midline L5/S1 interlaminar epidural injection #3--95% IMPROVED     SEDATION:       [] IV           [] ORAL    [] Blood Thinner:                 []Diabetic           []Antibiotic:               []Glaucoma:    [] Pacemaker/defib       [] Current Open Wounds, Lacerations or Sores     Allergies:    Allergies   Allergen Reactions    Cephalexin Hives       Past Medical History:   Diagnosis Date    Actinic keratoses     Allergic rhinitis     Arthritis     Environmental allergies     Prostate cancer ([facility] [address]     PROSTATE        Current Outpatient Medications   Medication Sig Dispense Refill    loratadine-pseudoephedrine (ALLERGY RELIEF D-24)  MG per extended release tablet TAKE ONE TABLET BY MOUTH DAILY 90 tablet 3    nitrofurantoin (MACRODANTIN) 100 MG capsule Take 100 mg by mouth daily      Ascorbic Acid (VITAMIN C) 1000 MG tablet Take 1,000 mg by mouth daily  Denosumab (PROLIA SC) Inject into the skin every 6 months      donepezil (ARICEPT) 10 MG tablet Take 1 tablet by mouth nightly 90 tablet 3    meloxicam (MOBIC) 15 MG tablet TAKE 1 TABLET BY MOUTH ONCE DAILY 45 tablet 0    diclofenac sodium 1 % GEL Apply 2 g topically 4 times daily 2 Tube 1    Red Yeast Rice 600 MG TABS Take 600 mg by mouth 2 times daily      Turmeric 500 MG TABS Take 500 mg by mouth 2 times daily      Cinnamon 500 MG CAPS Take 500 mg by mouth 2 times daily      Omega-3 Fatty Acids (FISH OIL) 1000 MG CAPS Take 1,000 mg by mouth 2 times daily       POTASSIUM CHLORIDE PO Take by mouth daily 99 mg takes 1/2 tab unless having cramps then takes whole tab.  fluticasone (FLONASE) 50 MCG/ACT nasal spray 2 sprays bilaterally qd 3 Bottle 3    vitamin D 1000 units CAPS Take by mouth      Sennosides (SENNA LAXATIVE PO) Take by mouth      MAGNESIUM PO Take by mouth 2 times daily       Calcium Carb-Cholecalciferol 600-500 MG-UNIT CAPS Take 1,000 Units by mouth daily      leuprolide (LUPRON) 11.25 MG injection Inject 11.25 mg into the muscle every 6 months       acetaminophen (TYLENOL) 500 MG tablet Take 1 tablet by mouth every 6 hours as needed for Pain 120 tablet      No current facility-administered medications for this visit. [facility]                     ______________________________________________________________________      [address] [place]      1. Admit to preop. 2. Start IV 1000 ml LR at [facility] or _____ml/hr for planned conscious sedation     3. May inject 1 % Lidocaine 0.1 ml Intradermal to numb IV site     4. Protime/INR if patient is on Coumadin     5. Urine Pregnancy Test (females only) - 12 -50 years     6. Accu Check Glucose if diabetic.   Notify physician if <80 or >250.      7. Sedate all neurotomies          ______________________________________________________________________ POST-OPERATIVE ORDERS - DR. CHANG      1. Admit to Post Op Phase 2     2. Implement Standards of Care for Phase 2 Post Op     3. Check Site - May discharge when site is free of bleeding     4. Discharge to home after meets Phase 2 criteria     5. Discharge cervical patients after 30 minutes and when meets Phase 2 criteria. 6. Give discharge instruction sheet     7. For Diabetic patient, if blood sugar less than 80 in preop,          Recheck blood sugar in Post Op. 8. Discontinue IV     9.  For Nausea may give Zofran 4 MG IV/IM/ODT           ______________________________________________________________________    Florina Carreon     1955 8/19/20 8:19 AM

## 2020-08-19 NOTE — LETTER
[facility]  Surgery Precert & Billing Form:    DEMOGRAPHICS:                                                                                                       Patient Name:  Joanne Bourne  Patient :  1955   Patient SS#:      Patient Phone:  [phone] (home)  Alt.  Patient Phone:    Patient Address:  [address] [place] [address]    PCP:  Natividad Jaramillo MD  Insurance: MEDICARE    DIAGNOSIS & PROCEDURE:                                                                                      Diagnosis: M47.816, M32.015  Operation: bilateral L3, L4, L5 MBB #1 OF 2    SURGERY  INFORMATION  Date of Surgery:   2020  Location:   [facility]  Type:    OUTPATIENT  23 hour hold:  NO  Surgeon:          Adelso Pettit MD  20     BILLING INFORMATION:                                                                                                Physician Procedure                                            CPT Codes        BILATERAL L3, L4, L5 MEDIAL BRANCH  [address], [facility] #1 OF2                PA, or Fellow Procedure                                      CPT Codes             3

## 2020-08-19 NOTE — TELEPHONE ENCOUNTER
Auth: # NPR    Date: 08/24/2020  Type of SX:  OP  [facility]  CPT: 22494  82434   DX Code: M47.816  M48.062   SX area: Bilateral L3  L4  L5  Medial Branch Blocks  Insurance: [employer]

## 2020-08-19 NOTE — PROGRESS NOTES
Follow up: SPINE    CHIEF COMPLAINT:    Chief Complaint   Patient presents with    Back Pain     fu back, has questions about MBB       HISTORY OF PRESENT ILLNESS:                The patient is a 77 y.o. male well-known to me with lumbar spondylo-scoliosis and neurogenic claudication. He typically gets 90 days relief from epidurals. He has 50-50 back pain and radiating leg pain. Last epidural May 2020. 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 He is asking about radiofrequency neurotomy.       Pain Assessment  Location of Pain: Back  Severity of Pain: 7  Quality of Pain: Aching  Duration of Pain: Persistent  Frequency of Pain: Intermittent  Aggravating Factors: Standing, Walking  Limiting Behavior: Yes  Relieving Factors: Rest  Result of Injury: No  Work-Related Injury: No  Are there other pain locations you wish to document?: No      Current/Past Treatment:   · Physical Therapy: [facility]  · Chiropractic: YES  · Injection:    7/30/19 Midline L5/S1 interlaminar epidural injection #2--95%  2/26/19 Midline L5/S1 interlaminar epidural injection #1--90% IMPROVED   10/22/19 Midline L5/S1 interlaminar epidural injection #3--95% IMPROVED      5/18/2020 Right L5/S1 interlaminar epidural injection--100%  Medications:            NSAIDS: Mobic            Muscle relaxer:              Steriods:              Neuropathic medications:              Opioids:            Other:   · Surgery/Consult: no     Work Status/Functionality: [employer]  Past Medical History: Medical history form was reviewed and scanned into the Media tab  Past Medical History:   Diagnosis Date    Actinic keratoses     Allergic rhinitis     Arthritis     Environmental allergies     Prostate cancer ([facility] [address]     PROSTATE        REVIEW OF SYSTEMS:   CONSTITUTIONAL: Denies unexplained weight loss, fevers, chills or fatigue  NEUROLOGIC: Denies tremors or seizures         PHYSICAL EXAM:    Vitals: Height 5' 5\" (1.651 m), weight 191 lb (86.6 kg). GENERAL EXAM:  · General Apparence: Patient is adequately groomed with no evidence of malnutrition. · Orientation: The patient is oriented to time, place and person. · Mood & Affect:The patient's mood and affect are appropriate   · Vascular: Examination reveals no swelling tenderness in upper or lower extremities. · Lymphatic: The lymphatic examination bilaterally reveals all areas to be without enlargement or induration  · Sensation: Sensation is intact without deficit  · Coordination/Balance: Good coordination   ·   LUMBAR/SACRAL EXAMINATION:  · Inspection: Local inspection shows no step-off or bruising. Lumbar alignment is normal.  Sagittal and Coronal balance is neutral.      · Palpation:   No evidence of tenderness at the midline. No tenderness bilaterally at the paraspinal or trochanters. There is no step-off or paraspinal spasm. · Range of Motion:   Mod loss flex/ext  · Strength:   Strength testing is 5/5 in all muscle groups tested. · Special Tests:   Straight leg raise and crossed SLR negative. Leg length and pelvis level.  0 out of 5 Allison's signs. · Skin: There are no rashes, ulcerations or lesions. · Reflexes: Reflexes are symmetrically 2+ at the patellar and ankle tendons. Clonus absent bilaterally at the feet. · Gait & station: normal gait  · Additional Examinations:   ·   · RIGHT LOWER EXTREMITY: Inspection/examination of the right lower extremity does not show any tenderness, deformity or injury. Range of motion is full. There is no gross instability. There are no rashes, ulcerations or lesions. Strength and tone are normal.  · LEFT LOWER EXTREMITY:  Inspection/examination of the left lower extremity does not show any tenderness, deformity or injury. Range of motion is full. There is no gross instability. There are no rashes, ulcerations or lesions.   Strength and tone are normal.    2 views lumbar spine 3/12/2020 show scoliosis with multilevel severe

## 2020-08-24 ENCOUNTER — HOSPITAL ENCOUNTER (OUTPATIENT)
Age: 65
Setting detail: OUTPATIENT SURGERY
Discharge: HOME | End: 2020-08-24
Attending: PHYSICAL MEDICINE & REHABILITATION | Admitting: PHYSICAL MEDICINE & REHABILITATION
Payer: MEDICARE

## 2020-08-24 VITALS
TEMPERATURE: 97 F | HEART RATE: 68 BPM | DIASTOLIC BLOOD PRESSURE: 100 MMHG | HEIGHT: 65 IN | WEIGHT: 185 LBS | RESPIRATION RATE: 12 BRPM | BODY MASS INDEX: 30.82 KG/M2 | OXYGEN SATURATION: 98 % | SYSTOLIC BLOOD PRESSURE: 168 MMHG

## 2020-08-24 PROCEDURE — 2709999900 HC NON-CHARGEABLE SUPPLY: Performed by: PHYSICAL MEDICINE & REHABILITATION

## 2020-08-24 PROCEDURE — 3600000002 HC SURGERY LEVEL 2 BASE: Performed by: PHYSICAL MEDICINE & REHABILITATION

## 2020-08-24 PROCEDURE — 3600000012 HC SURGERY LEVEL 2 ADDTL 15MIN: Performed by: PHYSICAL MEDICINE & REHABILITATION

## 2020-08-24 PROCEDURE — 7100000010 HC PHASE II RECOVERY - FIRST 15 MIN: Performed by: PHYSICAL MEDICINE & REHABILITATION

## 2020-08-24 PROCEDURE — 2500000003 HC RX 250 WO HCPCS: Performed by: PHYSICAL MEDICINE & REHABILITATION

## 2020-08-24 RX ORDER — BUPIVACAINE HYDROCHLORIDE 7.5 MG/ML
INJECTION, SOLUTION EPIDURAL; RETROBULBAR PRN
Status: DISCONTINUED | OUTPATIENT
Start: 2020-08-24 | End: 2020-08-24 | Stop reason: ALTCHOICE

## 2020-08-24 RX ORDER — BUPIVACAINE HYDROCHLORIDE 7.5 MG/ML
INJECTION, SOLUTION EPIDURAL; RETROBULBAR
Status: DISCONTINUED
Start: 2020-08-24 | End: 2020-08-24 | Stop reason: HOSPADM

## 2020-08-24 ASSESSMENT — PAIN - FUNCTIONAL ASSESSMENT: PAIN_FUNCTIONAL_ASSESSMENT: 0-10

## 2020-08-24 ASSESSMENT — PAIN DESCRIPTION - DESCRIPTORS: DESCRIPTORS: ACHING

## 2020-08-24 NOTE — PROGRESS NOTES
Discharge instructions given to pt and verbalized understanding, states pain is at a tolerable level, no numbness or weakness noted,vitals stable, pt ambulated out to car without complications, son driving home

## 2020-08-25 NOTE — OP NOTE
Patient:  Ayaan Woodard   Medical Record #:  [id number]   Date:  8-  Physician:  Nando Garcia M.D. Facility: [facility]     Pre-op diagnosis:  Lumbar facet pain syndrome, facet arthropathy, lumbar spondylosis  Post-op diagnosis:  same  Procedure: Bilateral L3, L4, and L5 medial branch blocks #1 with fluoroscopic guidance  Anesthesia: none  Procedure Note:    The patient was admitted through pre-op and written consent was obtained. The patient was advised of the risks and benefits of the procedure, including but not limited to the following: bleeding, pain, infection, temporary paralysis, nerve damage and spinal headache. The patient was given the opportunity to ask questions. There were no contraindications for this procedure. The appropriate area was prepped and draped in a sterile fashion. Landmarks were identified and marked. Three 25G spinal needle were directed to the right L3, L4, and L5 medial medial branches using fluoroscopic guidance with ideal needle tip confirmed by multiple views. There were no signs of intravascular or intrathecal injection. 0.5cc of 0.75% marcaine were injected at each site. There were no complications and the patient tolerated the procedure well. The patient was transferred to the recovery area and monitored. Discharge instructions were given. The patient is to contact me for any post-procedure concerns. The patient is to follow up as scheduled. The same procedure was repeated on the contralateral side. Pain diary was discussed and provided.     Estimated blood loss: none    F Vlad Nolan MD

## 2020-08-31 ENCOUNTER — VIRTUAL VISIT (OUTPATIENT)
Dept: ORTHOPEDIC SURGERY | Age: 65
End: 2020-08-31
Payer: MEDICARE

## 2020-08-31 ENCOUNTER — TELEPHONE (OUTPATIENT)
Dept: ORTHOPEDIC SURGERY | Age: 65
End: 2020-08-31

## 2020-08-31 PROCEDURE — 99442 PR PHYS/QHP TELEPHONE EVALUATION 11-20 MIN: CPT | Performed by: PHYSICIAN ASSISTANT

## 2020-08-31 NOTE — TELEPHONE ENCOUNTER
Auth: # NPR    Date: 09/01/2020     Type of SX:  OP  Location: [facility]  CPT: 53564  06394   DX Code: K18.846   M46.56  SX area: Bilateral L3  L4  L5 Medial branch block  Insurance: Medicare

## 2020-08-31 NOTE — LETTER
[place] [facility]    Please Schedule the following with: Dr. Idania Vaughn    Date:  8/31/20     Patient: Mendel Neither     YOB: 1955    Patient Home Phone: [phone] (home)    Diagnosis: Lumbar spondylosis M47.816, facet arthropathy M46.96    []LT     []RT     [x]RADHA     []Midline    Levels: L3-L4-L5 #2    []Cervical AMPARO 99961, 96422  [x]L-MBB 44327, 56669  []SI Joint 88356   []C-FACET 86954, 61930, 04397  []L-FACET [id number], [id number]  []Interlaminar AMPARO 44599     []HIP 97826    []C-MBB  []Transforaminal AMPARO 36206  []Neurotomy 20123, 89102, 50515    Attending Physician: Jimmy Durant    Injection Schedule for:  9/1/2020 AT 12 NOON    At: [facility]    First Insurance: MEDICARE                                   Pre-cert #:  [facility]  Second Insurance:                 Pre-cert #:    Comments:  2/26/19 Midline L5/S1 interlaminar epidural injection #1  7/30/19 Midline L5/S1 interlaminar epidural injection #2  10/22/19 Midline L5/S1 interlaminar epidural injection #3    5/18/2020 Right L5/S1 interlaminar epidural injection  8/24/2020 Bilateral L3, L4, and L5 medial branch blocks #1 of 2--diagnostic benefit    SEDATION:       [] IV           [] ORAL    [] Blood Thinner:                 []Diabetic           [x]Antibiotic: Prophylactic, no active infection              []Glaucoma:    [] Pacemaker/defib       [] Current Open Wounds, Lacerations or Sores     Allergies:    Allergies   Allergen Reactions    Cephalexin Hives       Past Medical History:   Diagnosis Date    Actinic keratoses     Allergic rhinitis     Arthritis     Environmental allergies     Prostate cancer ([address]     PROSTATE        Current Outpatient Medications   Medication Sig Dispense Refill    loratadine-pseudoephedrine (ALLERGY RELIEF D-24)  MG per extended release tablet TAKE ONE TABLET BY MOUTH DAILY 90 tablet 3  nitrofurantoin (MACRODANTIN) 100 MG capsule Take 100 mg by mouth daily      Ascorbic Acid (VITAMIN C) 1000 MG tablet Take 1,000 mg by mouth daily      Denosumab (PROLIA SC) Inject into the skin every 6 months      donepezil (ARICEPT) 10 MG tablet Take 1 tablet by mouth nightly 90 tablet 3    meloxicam (MOBIC) 15 MG tablet TAKE 1 TABLET BY MOUTH ONCE DAILY 45 tablet 0    diclofenac sodium 1 % GEL Apply 2 g topically 4 times daily 2 Tube 1    Red Yeast Rice 600 MG TABS Take 600 mg by mouth 2 times daily      Turmeric 500 MG TABS Take 500 mg by mouth 2 times daily      Cinnamon 500 MG CAPS Take 500 mg by mouth 2 times daily      Omega-3 Fatty Acids (FISH OIL) 1000 MG CAPS Take 1,000 mg by mouth 2 times daily       POTASSIUM CHLORIDE PO Take by mouth daily 99 mg takes 1/2 tab unless having cramps then takes whole tab.  fluticasone (FLONASE) 50 MCG/ACT nasal spray 2 sprays bilaterally qd 3 Bottle 3    vitamin D 1000 units CAPS Take by mouth      Sennosides (SENNA LAXATIVE PO) Take by mouth      MAGNESIUM PO Take by mouth 2 times daily       Calcium Carb-Cholecalciferol 600-500 MG-UNIT CAPS Take 1,000 Units by mouth daily      leuprolide (LUPRON) 11.25 MG injection Inject 11.25 mg into the muscle every 6 months       acetaminophen (TYLENOL) 500 MG tablet Take 1 tablet by mouth every 6 hours as needed for Pain 120 tablet      No current facility-administered medications for this visit. [facility]                     ______________________________________________________________________      [address] [place]      1. Admit to preop. 2. Start IV 1000 ml LR at [facility] or _____ml/hr for planned conscious sedation     3. May inject 1 % Lidocaine 0.1 ml Intradermal to numb IV site     4. Protime/INR if patient is on Coumadin     5.  Urine Pregnancy Test (females only) - 12 -50 years 6. Accu Check Glucose if diabetic. Notify physician if <80 or >250.      7. Sedate all neurotomies          ______________________________________________________________________    POST-OPERATIVE ORDERS - DR. CHANG      1. Admit to Post Op Phase 2     2. Implement Standards of Care for Phase 2 Post Op     3. Check Site - May discharge when site is free of bleeding     4. Discharge to home after meets Phase 2 criteria     5. Discharge cervical patients after 30 minutes and when meets Phase 2 criteria. 6. Give discharge instruction sheet     7. For Diabetic patient, if blood sugar less than 80 in preop,          Recheck blood sugar in Post Op. 8. Discontinue IV     9.  For Nausea may give Zofran 4 MG IV/IM/ODT           ______________________________________________________________________    Jake Cifuentes     1955 8/31/20 8:36 AM

## 2020-08-31 NOTE — PROGRESS NOTES
TELEPHONE VISIT: SPINE    CHIEF COMPLAINT:    Chief Complaint   Patient presents with    Neck Pain       Patient provided verbal consent to proceed with telephone visit; aware he/she may receive a bill for this telephone service, depending on insurance coverage. The patient is being evaluated by a telephone encounter due to the restrictions of the COVID-19 pandemic. A caregiver was present when appropriate. All issues as below were discussed and addressed, but no physical exam was performed unless allowed by visual confirmation. If it was felt that patient should be evaluated in clinic then they were directed there. Due to this being a TeleHealth encounter (During PYCUS-78 public health emergency), evaluation of the following organ systems was limited to: spine. Pursuant to the emergency declaration under the [facility], [facility] waiver authority and the [employer] Act, this Virtual Visit was conducted with patient's verbal consent, to reduce the risk of exposure to COVID-19 and provide necessary medical care. The patient (and/or legal guardian) has also been advised to contact this office for worsening conditions or problems, and seek emergency medical treatment and/or call 911 if deemed necessary. Individuals present during telemedicine consultation-Candy Ayala [facility] & the patient     HISTORY OF PRESENT ILLNESS:                The patient is a 77 y.o. male history of prostate cancer, consent granted for telephone visit to follow-up after bilateral L3-L4-L5 MBB #1 from 8/24/2020 for chronic aching low back pain. Symptoms are increased with any prolonged activity walking standing or driving. Relief with resting. He reports 80% diagnostic benefit with MBB. He states he was able to be more functional the day of the procedure. His symptoms are now back to baseline at this time.   Other conservative care includes PT, chiropractics, inversion table, NSAIDs, Tylenol, intermittent AMPARO's. He denies any recent bowel or bladder dysfunction or recent trauma. No recent fevers chills infections. No side effects to the procedure.      Current/Past Treatment:   · Physical Therapy: HEP  · Chiropractic: YES  · Injection:    7/30/19 Midline L5/S1 interlaminar epidural injection #2--95%  2/26/19 Midline L5/S1 interlaminar epidural injection #1--90% IMPROVED   10/22/19 Midline L5/S1 interlaminar epidural injection #3--95% IMPROVED      5/18/2020 Right L5/S1 interlaminar epidural injection--100%  8/24/2020 Bilateral L3, L4, and L5 medial branch blocks #1 of 2--80% DX relief   Medications:            NSAIDS: Mobic            Muscle relaxer:              Steriods:              Neuropathic medications:              Opioids:            Other:   · Surgery/Consult: no     Work Status/Functionality: kelly and farmer    Medical history:  Past Medical History:   Diagnosis Date    Actinic keratoses     Allergic rhinitis     Arthritis     Environmental allergies     Prostate cancer ([facility] [address]     PROSTATE       Past Surgical History:     Past Surgical History:   Procedure Laterality Date    COLONOSCOPY  2-16-16    EPIDURAL STEROID INJECTION N/A 2/26/2019    MIDLINE LUMBAR FIVE SACRAL ONE EPIDURAL STEROID INJECTION SITE CONFIRMED BY FLUOROSCOPY performed by Julissa Bojorquez MD at [facility] N/A 7/30/2019    MIDLINE LUMBAR FIVE SACRAL ONE EPIDURAL STEROID INJECTION SITE CONFIRMED BY FLUOROSCOPY performed by Julissa Bojorquez MD at [facility] N/A 10/22/2019    MIDLINE LUMBAR FIVE SACRAL ONE EPIDURAL STEROID INJECTION SITE CONFIRMED BY FLUOROSCOPY performed by Julissa Bojorquez MD at . [address] ARTHROSCOPY Left 4/26/16    partial medial and lateral meniscectomy, chondroplasty    KNEE SURGERY Right 10/15/13    RT KNEE PARTIAL MEDIAL, LATERAL MENISECTOMY AND CHONDROPLASTY WITH REMOVAL OF LOSSE HARDWARE    KNEE SURGERY Right 10/15/13    RT KNEE ARTHROSCOPY PARTIAL LATERAL AND MEDIAL MENISECTOMY WITH CHONDROPLASTY AND REMOVAL OF LOOSE BODIES    PAIN MANAGEMENT PROCEDURE N/A 5/18/2020    MIDLINE LUMBAR FIVE SACRAL ONE EPIDURAL STEROID INJECTION SITE CONFIRMED BY FLUOROSCOPY performed by Jessica Mcdonald MD at [address] [facility] Bilateral 8/24/2020    BILATERAL LUMBAR THREE, FOUR, FIVE MEDIAL BRANCH BLOCK SITE CONFIRMED BY FLUOROSCOPY performed by Jessica Mcdonald MD at [facility]  07/17/15    DaVinci prostatectomy    SHOULDER SURGERY       Current Medications:     Current Outpatient Medications:     loratadine-pseudoephedrine (ALLERGY RELIEF D-24)  MG per extended release tablet, TAKE ONE TABLET BY MOUTH DAILY, Disp: 90 tablet, Rfl: 3    nitrofurantoin (MACRODANTIN) 100 MG capsule, Take 100 mg by mouth daily, Disp: , Rfl:     Ascorbic Acid (VITAMIN C) 1000 MG tablet, Take 1,000 mg by mouth daily, Disp: , Rfl:     Denosumab (PROLIA SC), Inject into the skin every 6 months, Disp: , Rfl:     donepezil (ARICEPT) 10 MG tablet, Take 1 tablet by mouth nightly, Disp: 90 tablet, Rfl: 3    meloxicam (MOBIC) 15 MG tablet, TAKE 1 TABLET BY MOUTH ONCE DAILY, Disp: 45 tablet, Rfl: 0    diclofenac sodium 1 % GEL, Apply 2 g topically 4 times daily, Disp: 2 Tube, Rfl: 1    Red Yeast Rice 600 MG TABS, Take 600 mg by mouth 2 times daily, Disp: , Rfl:     Turmeric 500 MG TABS, Take 500 mg by mouth 2 times daily, Disp: , Rfl:     Cinnamon 500 MG CAPS, Take 500 mg by mouth 2 times daily, Disp: , Rfl:     Omega-3 Fatty Acids (FISH OIL) 1000 MG CAPS, Take 1,000 mg by mouth 2 times daily , Disp: , Rfl:     POTASSIUM CHLORIDE PO, Take by mouth daily 99 mg takes 1/2 tab unless having cramps then takes whole tab., Disp: , Rfl:     fluticasone (FLONASE) 50 MCG/ACT nasal spray, 2 sprays bilaterally qd, Disp: 3 Bottle, Rfl: 3    vitamin D 1000 units CAPS, Take by mouth, Disp: , Rfl:     Sennosides (SENNA LAXATIVE PO), Take by mouth, Disp: , Rfl:     MAGNESIUM PO, Take by mouth 2 times daily , Disp: , Rfl:     Calcium Carb-Cholecalciferol 600-500 MG-UNIT CAPS, Take 1,000 Units by mouth daily, Disp: , Rfl:     leuprolide (LUPRON) 11.25 MG injection, Inject 11.25 mg into the muscle every 6 months , Disp: , Rfl:     acetaminophen (TYLENOL) 500 MG tablet, Take 1 tablet by mouth every 6 hours as needed for Pain, Disp: 120 tablet, Rfl:   Allergies:  Cephalexin  Social History:    reports that he has never smoked. He has never used smokeless tobacco. He reports current alcohol use of about 1.0 standard drinks of alcohol per week. He reports that he does not use drugs. Family History:   Family History   Problem Relation Age of Onset    Diabetes Mother     Heart Disease Mother 79        2 vessel bypass    High Blood Pressure Mother     Alzheimer's Disease Mother     Arthritis Father     Heart Disease Father 68        stents    High Blood Pressure Father     Stroke Father     Heart Disease Paternal Uncle     Alzheimer's Disease Maternal Grandmother     Cancer Paternal Grandfather     Arthritis Paternal Grandfather        REVIEW OF SYSTEMS:   CONSTITUTIONAL: Denies unexplained weight loss, fevers, chills or fatigue  NEUROLOGICAL: Denies unsteady gait or progressive weakness  MUSCULOSKELETAL: Denies joint swelling or redness  GI: Denies nausea, vomiting, diarrhea   : Denies bowel or bladder issues         Vitals: Height 5' 5\" (1.651 m), weight 191 lb (86.6 kg).     Diagnostic Testin views lumbar spine 3/12/2020 show scoliosis with multilevel severe DDD/spondylosis and likely degenerative endplate changes L6-V3.  Lateral view is skewed due to scoliotic curve.  No significant changes compared to prior     2--19 MRI independently reviewed showing multilevel scoliosis, ddd, spondylosis, varying mostly mild lumbar central narrowing-moderate L3-4.  Leg symptoms clinically from lumbar spine.      2 views lumbar spine 1/31/2019 shows scoliosis with multilevel at least moderate DDD/spondylosis and facet arthropathy.  Lateral view is skewed due to scoliotic curve.     Arterial dopplers 1-23-19  Conclusions        Summary        1. Normal bilateral lower extremity arterial segmental evaluation. No    evidence for resting arterial insufficiency.    2. The patient's vessels suggest possible non-compliance with    non-compressible areas noted.               Impression:  1) Chronic recurrent LBP--contributing facet pain  2) bilateral leg pain, neurogenic claudication--improved since AMPARO  2) Scoliosis, L3 4 central stenosis  3) H/o prostate cancer  4) Negative vascular eval          Plan:  1) Bilateral L3, L4, L5 MBB #2. Procedure risk and benefits were discussed.   2) We discussed subsequent radiofrequency neurotomy if concordant    Present during telephone call: Matteo Villegas [facility]    Total time spent on medical discussion/telephone visit: Torin Stokes [facility]

## 2020-09-01 ENCOUNTER — HOSPITAL ENCOUNTER (OUTPATIENT)
Age: 65
Setting detail: OUTPATIENT SURGERY
Discharge: HOME | End: 2020-09-01
Attending: PHYSICAL MEDICINE & REHABILITATION | Admitting: PHYSICAL MEDICINE & REHABILITATION
Payer: MEDICARE

## 2020-09-01 VITALS
HEART RATE: 73 BPM | RESPIRATION RATE: 15 BRPM | DIASTOLIC BLOOD PRESSURE: 93 MMHG | HEIGHT: 65 IN | BODY MASS INDEX: 30.82 KG/M2 | SYSTOLIC BLOOD PRESSURE: 159 MMHG | OXYGEN SATURATION: 97 % | WEIGHT: 185 LBS | TEMPERATURE: 96.3 F

## 2020-09-01 PROCEDURE — 2500000003 HC RX 250 WO HCPCS: Performed by: PHYSICAL MEDICINE & REHABILITATION

## 2020-09-01 PROCEDURE — 7100000010 HC PHASE II RECOVERY - FIRST 15 MIN: Performed by: PHYSICAL MEDICINE & REHABILITATION

## 2020-09-01 PROCEDURE — 2709999900 HC NON-CHARGEABLE SUPPLY: Performed by: PHYSICAL MEDICINE & REHABILITATION

## 2020-09-01 PROCEDURE — 3600000002 HC SURGERY LEVEL 2 BASE: Performed by: PHYSICAL MEDICINE & REHABILITATION

## 2020-09-01 PROCEDURE — 3600000012 HC SURGERY LEVEL 2 ADDTL 15MIN: Performed by: PHYSICAL MEDICINE & REHABILITATION

## 2020-09-01 RX ORDER — ASPIRIN 81 MG/1
81 TABLET, CHEWABLE ORAL DAILY
COMMUNITY
End: 2020-12-23

## 2020-09-01 RX ORDER — BUPIVACAINE HYDROCHLORIDE 7.5 MG/ML
INJECTION, SOLUTION EPIDURAL; RETROBULBAR
Status: DISCONTINUED
Start: 2020-09-01 | End: 2020-09-01 | Stop reason: HOSPADM

## 2020-09-01 RX ORDER — LIDOCAINE HYDROCHLORIDE 10 MG/ML
INJECTION, SOLUTION EPIDURAL; INFILTRATION; INTRACAUDAL; PERINEURAL PRN
Status: DISCONTINUED | OUTPATIENT
Start: 2020-09-01 | End: 2020-09-01 | Stop reason: ALTCHOICE

## 2020-09-01 RX ORDER — BUPIVACAINE HYDROCHLORIDE 7.5 MG/ML
INJECTION, SOLUTION EPIDURAL; RETROBULBAR PRN
Status: DISCONTINUED | OUTPATIENT
Start: 2020-09-01 | End: 2020-09-01 | Stop reason: ALTCHOICE

## 2020-09-01 ASSESSMENT — PAIN DESCRIPTION - DESCRIPTORS: DESCRIPTORS: ACHING;CONSTANT

## 2020-09-01 NOTE — OP NOTE
Patient:  Ivette Paul   Medical Record #:  [id number]   Date:  9/1/2020  Physician:  Giovani Lorenzo M.D. Facility: [facility]     Pre-op diagnosis:  Lumbar facet pain syndrome, facet arthropathy, lumbar spondylosis  Post-op diagnosis:  same  Procedure: Bilateral L3, L4, and L5 medial branch blocks #2 with fluoroscopic guidance  Anesthesia: none  Procedure Note:    The patient was admitted through pre-op and written consent was obtained. The patient was advised of the risks and benefits of the procedure, including but not limited to the following: bleeding, pain, infection, temporary paralysis, nerve damage and spinal headache. The patient was given the opportunity to ask questions. There were no contraindications for this procedure. The appropriate area was prepped and draped in a sterile fashion. Landmarks were identified and marked. Three 25G spinal needle were directed to the right L3, L4, and L5 medial medial branches using fluoroscopic guidance with ideal needle tip confirmed by multiple views. There were no signs of intravascular or intrathecal injection. 0.5cc of 0.75% marcaine were injected at each site. There were no complications and the patient tolerated the procedure well. The patient was transferred to the recovery area and monitored. Discharge instructions were given. The patient is to contact me for any post-procedure concerns. The patient is to follow up as scheduled. The same procedure was repeated on the contralateral side. Pain diary was discussed and provided.     Estimated blood loss: none    VIVINAA Bhagat MD

## 2020-09-03 NOTE — H&P
by mouth nightly 4/13/20   Delano Carrion MD   meloxicam (MOBIC) 15 MG tablet TAKE 1 TABLET BY MOUTH ONCE DAILY 10/8/19   Delano Carrion MD   diclofenac sodium 1 % GEL Apply 2 g topically 4 times daily 9/16/19   Delano Carrion MD   fluticasone [facility]) 50 MCG/ACT nasal spray 2 sprays bilaterally qd 4/2/18   Delano Carrion MD   Sennosides (SENNA LAXATIVE PO) Take by mouth    Historical Provider, MD   leuprolide (LUPRON) 11.25 MG injection Inject 11.25 mg into the muscle every 6 months     Historical Provider, MD       Vitals: Stable     PHYSICAL EXAM:  HENT: Airway patent and reviewed  Cardiovascular: Normal rate, regular rhythm, normal heart sounds. Pulmonary/Chest: No wheezes. No rhonchi. No rales. Abdominal: Soft. Bowel sounds are normal. No distension. Mallampati: 2      MALLAMPATI:           []   I. Complete visualization of the soft palate           [x]   II. Complete visualization of the uvula            []   III. Visualization of only the base of the uvula           []   IV. Soft palate is not visible     ASA CLASS:         []   I. Normal, healthy adult           [x]   II.  Mild systemic disease            []   III. Severe systemic disease      Sedation plan:   [x]  Local              []  Minimal                  []  General anesthesia    Patient's condition acceptable for planned procedure/sedation. Post Procedure Plan   Return to same level of care   ______________________     The risks and benefits as well as alternatives to the procedure have been discussed with the patient and or family. The patient and or next of kin understands and agrees to proceed.     Neal Lewis M.D.

## 2020-09-08 ENCOUNTER — VIRTUAL VISIT (OUTPATIENT)
Dept: ORTHOPEDIC SURGERY | Age: 65
End: 2020-09-08
Payer: MEDICARE

## 2020-09-08 PROCEDURE — 99442 PR PHYS/QHP TELEPHONE EVALUATION 11-20 MIN: CPT | Performed by: PHYSICIAN ASSISTANT

## 2020-09-08 NOTE — PROGRESS NOTES
TELEPHONE VISIT: SPINE    CHIEF COMPLAINT:    Chief Complaint   Patient presents with    Back Pain       Patient provided verbal consent to proceed with telephone visit; aware he/she may receive a bill for this telephone service, depending on insurance coverage. The patient is being evaluated by a telephone encounter due to the restrictions of the COVID-19 pandemic. A caregiver was present when appropriate. All issues as below were discussed and addressed, but no physical exam was performed unless allowed by visual confirmation. If it was felt that patient should be evaluated in clinic then they were directed there. Due to this being a TeleHealth encounter ([facility] public health [facility]), evaluation of the following organ systems was limited to: spine. Pursuant to the emergency declaration under the [facility]5 waiver authority and the [employer] Act, this Virtual Visit was conducted with patient's verbal consent, to reduce the risk of exposure to COVID-19 and provide necessary medical care. The patient (and/or legal guardian) has also been advised to contact this office for worsening conditions or problems, and seek emergency medical treatment and/or call 911 if deemed necessary. Individuals present during telemedicine consultation-[facility]     HISTORY OF PRESENT ILLNESS:                The patient is a 77 y.o. male history of prostate cancer, consent granted for telephone visit to follow-up after bilateral L3-L4-L5 MBB #2 from 9-1-2020 for chronic aching low back pain. Symptoms are increased with any prolonged activity walking standing or driving. Relief with resting. He reports 100% diagnostic benefit with MBB x8hrs. He states he was able to be more functional the day of the procedure--walked his dog. His symptoms are now back to baseline at this time.   Other conservative care includes PT, chiropractics, inversion table, NSAIDs, Tylenol, intermittent AMPARO's. He denies any recent bowel or bladder dysfunction or recent trauma. No recent fevers chills infections. No side effects to the procedure.      Current/Past Treatment:   · Physical Therapy: HEP  · Chiropractic: YES  · Injection:    7/30/19 Midline L5/S1 interlaminar epidural injection #2--95%  2/26/19 Midline L5/S1 interlaminar epidural injection #1--90% IMPROVED   10/22/19 Midline L5/S1 interlaminar epidural injection #3--95% IMPROVED      5/18/2020 Right L5/S1 interlaminar epidural injection--100%    8/24/2020 Bilateral L3, L4, and L5 medial branch blocks #1 of 2--80% DX relief     9/1/2020 Bilateral L3, L4, and L5 medial branch blocks #2--100% DX relied     Medications:            NSAIDS: Mobic            Muscle relaxer:              Steriods:              Neuropathic medications:              Opioids:            Other:   · Surgery/Consult: no     Work Status/Functionality: kelly and farmer    Medical history:  Past Medical History:   Diagnosis Date    Actinic keratoses     Allergic rhinitis     Arthritis     Environmental allergies     Prostate cancer ([facility] [address]     PROSTATE       Past Surgical History:     Past Surgical History:   Procedure Laterality Date    COLONOSCOPY  2-16-16    EPIDURAL STEROID INJECTION N/A 2/26/2019    MIDLINE LUMBAR FIVE SACRAL ONE EPIDURAL STEROID INJECTION SITE CONFIRMED BY FLUOROSCOPY performed by Natanael Jett MD at [facility] N/A 7/30/2019    MIDLINE LUMBAR FIVE SACRAL ONE EPIDURAL STEROID INJECTION SITE CONFIRMED BY FLUOROSCOPY performed by Natanael Jett MD at [facility] N/A 10/22/2019    MIDLINE LUMBAR FIVE SACRAL ONE EPIDURAL STEROID INJECTION SITE CONFIRMED BY FLUOROSCOPY performed by Natanael Jett MD at . [address] ARTHROSCOPY Left 4/26/16    partial medial and lateral Rfl:     Cinnamon 500 MG CAPS, Take 500 mg by mouth 2 times daily, Disp: , Rfl:     Omega-3 Fatty Acids (FISH OIL) 1000 MG CAPS, Take 1,000 mg by mouth 2 times daily , Disp: , Rfl:     POTASSIUM CHLORIDE PO, Take by mouth daily 99 mg takes 1/2 tab unless having cramps then takes whole tab., Disp: , Rfl:     fluticasone (FLONASE) 50 MCG/ACT nasal spray, 2 sprays bilaterally qd, Disp: 3 Bottle, Rfl: 3    vitamin D 1000 units CAPS, Take by mouth, Disp: , Rfl:     Sennosides (SENNA LAXATIVE PO), Take by mouth, Disp: , Rfl:     MAGNESIUM PO, Take by mouth 2 times daily , Disp: , Rfl:     Calcium Carb-Cholecalciferol 600-500 MG-UNIT CAPS, Take 1,000 Units by mouth daily, Disp: , Rfl:     leuprolide (LUPRON) 11.25 MG injection, Inject 11.25 mg into the muscle every 6 months , Disp: , Rfl:     acetaminophen (TYLENOL) 500 MG tablet, Take 1 tablet by mouth every 6 hours as needed for Pain, Disp: 120 tablet, Rfl:   Allergies:  Cephalexin  Social History:    reports that he has never smoked. He has never used smokeless tobacco. He reports current alcohol use of about 1.0 standard drinks of alcohol per week. He reports that he does not use drugs.   Family History:   Family History   Problem Relation Age of Onset    Diabetes Mother     Heart Disease Mother 79        2 vessel bypass    High Blood Pressure Mother     Alzheimer's Disease Mother     Arthritis Father     Heart Disease Father 68        stents    High Blood Pressure Father     Stroke Father     Heart Disease Paternal Uncle     Alzheimer's Disease Maternal Grandmother     Cancer Paternal Grandfather     Arthritis Paternal Grandfather        REVIEW OF SYSTEMS:   CONSTITUTIONAL: Denies unexplained weight loss, fevers, chills or fatigue  NEUROLOGICAL: Denies unsteady gait or progressive weakness  MUSCULOSKELETAL: Denies joint swelling or redness  GI: Denies nausea, vomiting, diarrhea   : Denies bowel or bladder issues         Vitals: Height 5' 5\" (1.651 m), weight 191 lb (86.6 kg). Diagnostic Testin views lumbar spine 3/12/2020 show scoliosis with multilevel severe DDD/spondylosis and likely degenerative endplate changes W7-V5.  Lateral view is skewed due to scoliotic curve.  No significant changes compared to prior     19 MRI independently reviewed showing multilevel scoliosis, ddd, spondylosis, varying mostly mild lumbar central narrowing-moderate L3-4.  Leg symptoms clinically from lumbar spine.      2 views lumbar spine 2019 shows scoliosis with multilevel at least moderate DDD/spondylosis and facet arthropathy.  Lateral view is skewed due to scoliotic curve.     Arterial dopplers 19  Conclusions        Summary        1. Normal bilateral lower extremity arterial segmental evaluation. No    evidence for resting arterial insufficiency.    2. The patient's vessels suggest possible non-compliance with    non-compressible areas noted.               Impression:  1) Chronic recurrent LBP--contributing facet pain  2) bilateral leg pain, neurogenic claudication--improved since AMPARO  2) Scoliosis, L3 4 central stenosis  3) H/o prostate cancer  4) Negative vascular eval          Plan:  1) Bilateral L3, L4, L5 radiofrequency neurotomy with IV sedation.   Procedure risk and benefits were discussed  2) follow-up after above    Present during telephone call: Dev Rocha, [facility]    Total time spent on medical discussion/telephone visit: June Tipton [facility]

## 2020-09-08 NOTE — LETTER
[facility]    Please Schedule the following with: Dr. Robbie Ferrer    Date:  9/8/20     Patient: David العراقي     YOB: 1955    Patient Home Phone: [phone] (home)    Diagnosis: Lumbar spondylosis M47.816, facet arthropathy M46.96, facet synovitis M65.9    []LT     []RT     [x]RADHA     []Midline    Levels: L3-L4-L5    []Cervical AMPARO 02537, 70454  []L-MBB 31835, 73377  []SI Joint 66243   []C-FACET 30356, 05273, [id number]  []L-FACET O442858, [id number]  []Interlaminar AMPARO 86446     []HIP 18980    []C-MBB  []Transforaminal AMPARO 32369  [x]Neurotomy 91119, 14079, 86641    Attending Physician: Roxana Lorenzana    Injection Schedule for: At: [facility]    First Insurance: MEDICARE                                   Pre-cert #: Onur [place]  Second Insurance:                 Pre-cert #:    Comments:  2/26/19 Midline L5/S1 interlaminar epidural injection #1  7/30/19 Midline L5/S1 interlaminar epidural injection #2  10/22/19 Midline L5/S1 interlaminar epidural injection #3    5/18/2020 Right L5/S1 interlaminar epidural injection  8/24/2020 Bilateral L3, L4, and L5 medial branch blocks #1 of 2  9/1/2020 Bilateral L3, L4, and L5 medial branch blocks #2--Dx relief   SEDATION:       [x] IV           [] ORAL    [] Blood Thinner:                 []Diabetic           [x]Antibiotic: Prophylactic, no active infection              []Glaucoma:    [] Pacemaker/defib       [] Current Open Wounds, Lacerations or Sores     Allergies:    Allergies   Allergen Reactions    Cephalexin Hives       Past Medical History:   Diagnosis Date    Actinic keratoses     Allergic rhinitis     Arthritis     Environmental allergies     Prostate cancer ([facility] [address]     PROSTATE        Current Outpatient Medications   Medication Sig Dispense Refill    aspirin 81 MG chewable tablet Take 81 mg by mouth daily      loratadine-pseudoephedrine (ALLERGY RELIEF D-24)  MG per extended release tablet TAKE ONE TABLET BY MOUTH DAILY 90 tablet 3    nitrofurantoin (MACRODANTIN) 100 MG capsule Take 100 mg by mouth daily      Ascorbic Acid (VITAMIN C) 1000 MG tablet Take 1,000 mg by mouth daily      Denosumab (PROLIA SC) Inject into the skin every 6 months      donepezil (ARICEPT) 10 MG tablet Take 1 tablet by mouth nightly 90 tablet 3    meloxicam (MOBIC) 15 MG tablet TAKE 1 TABLET BY MOUTH ONCE DAILY 45 tablet 0    diclofenac sodium 1 % GEL Apply 2 g topically 4 times daily 2 Tube 1    Red Yeast Rice 600 MG TABS Take 600 mg by mouth 2 times daily      Turmeric 500 MG TABS Take 500 mg by mouth 2 times daily      Cinnamon 500 MG CAPS Take 500 mg by mouth 2 times daily      Omega-3 Fatty Acids (FISH OIL) 1000 MG CAPS Take 1,000 mg by mouth 2 times daily       POTASSIUM CHLORIDE PO Take by mouth daily 99 mg takes 1/2 tab unless having cramps then takes whole tab.  fluticasone (FLONASE) 50 MCG/ACT nasal spray 2 sprays bilaterally qd 3 Bottle 3    vitamin D 1000 units CAPS Take by mouth      Sennosides (SENNA LAXATIVE PO) Take by mouth      MAGNESIUM PO Take by mouth 2 times daily       Calcium Carb-Cholecalciferol 600-500 MG-UNIT CAPS Take 1,000 Units by mouth daily      leuprolide (LUPRON) 11.25 MG injection Inject 11.25 mg into the muscle every 6 months       acetaminophen (TYLENOL) 500 MG tablet Take 1 tablet by mouth every 6 hours as needed for Pain 120 tablet      No current facility-administered medications for this visit. [facility]                     ______________________________________________________________________      [address] [place]      1. Admit to preop. 2. Start IV 1000 ml LR at [facility] or _____ml/hr for planned conscious sedation     3. May inject 1 % Lidocaine 0.1 ml Intradermal to numb IV site     4.  Protime/INR if patient is on Coumadin 5. Urine Pregnancy Test (females only) - 12 -50 years     6. Accu Check Glucose if diabetic. Notify physician if <80 or >250.      7. Sedate all neurotomies          ______________________________________________________________________    POST-OPERATIVE ORDERS - DR. CHANG      1. Admit to Post Op Phase 2     2. Implement Standards of Care for Phase 2 Post Op     3. Check Site - May discharge when site is free of bleeding     4. Discharge to home after meets Phase 2 criteria     5. Discharge cervical patients after 30 minutes and when meets Phase 2 criteria. 6. Give discharge instruction sheet     7. For Diabetic patient, if blood sugar less than 80 in preop,          Recheck blood sugar in Post Op. 8. Discontinue IV     9.  For Nausea may give Zofran 4 MG IV/IM/ODT           ______________________________________________________________________    Jonathan Park     1955 9/8/20 1:57 PM

## 2020-09-10 ENCOUNTER — TELEPHONE (OUTPATIENT)
Dept: ORTHOPEDIC SURGERY | Age: 65
End: 2020-09-10

## 2020-09-17 ENCOUNTER — OFFICE VISIT (OUTPATIENT)
Dept: ORTHOPEDIC SURGERY | Age: 65
End: 2020-09-17
Payer: MEDICARE

## 2020-09-17 VITALS — BODY MASS INDEX: 30.82 KG/M2 | WEIGHT: 185 LBS | HEIGHT: 65 IN

## 2020-09-17 PROBLEM — M17.0 PRIMARY OSTEOARTHRITIS OF BOTH KNEES: Status: ACTIVE | Noted: 2020-09-17

## 2020-09-17 PROCEDURE — 20610 DRAIN/INJ JOINT/BURSA W/O US: CPT | Performed by: ORTHOPAEDIC SURGERY

## 2020-09-17 PROCEDURE — 1123F ACP DISCUSS/DSCN MKR DOCD: CPT | Performed by: ORTHOPAEDIC SURGERY

## 2020-09-17 PROCEDURE — 4040F PNEUMOC VAC/ADMIN/RCVD: CPT | Performed by: ORTHOPAEDIC SURGERY

## 2020-09-17 PROCEDURE — 99213 OFFICE O/P EST LOW 20 MIN: CPT | Performed by: ORTHOPAEDIC SURGERY

## 2020-09-17 PROCEDURE — 1036F TOBACCO NON-USER: CPT | Performed by: ORTHOPAEDIC SURGERY

## 2020-09-17 PROCEDURE — G8417 CALC BMI ABV UP PARAM F/U: HCPCS | Performed by: ORTHOPAEDIC SURGERY

## 2020-09-17 PROCEDURE — 3017F COLORECTAL CA SCREEN DOC REV: CPT | Performed by: ORTHOPAEDIC SURGERY

## 2020-09-17 PROCEDURE — G8427 DOCREV CUR MEDS BY ELIG CLIN: HCPCS | Performed by: ORTHOPAEDIC SURGERY

## 2020-09-17 RX ORDER — DICLOFENAC SODIUM 75 MG/1
75 TABLET, DELAYED RELEASE ORAL 2 TIMES DAILY
Qty: 60 TABLET | Refills: 3 | Status: SHIPPED | OUTPATIENT
Start: 2020-09-17

## 2020-09-17 RX ORDER — METHYLPREDNISOLONE ACETATE 40 MG/ML
80 INJECTION, SUSPENSION INTRA-ARTICULAR; INTRALESIONAL; INTRAMUSCULAR; SOFT TISSUE ONCE
Status: COMPLETED | OUTPATIENT
Start: 2020-09-17 | End: 2020-09-17

## 2020-09-17 RX ORDER — LIDOCAINE HYDROCHLORIDE 10 MG/ML
4 INJECTION, SOLUTION INFILTRATION; PERINEURAL ONCE
Status: COMPLETED | OUTPATIENT
Start: 2020-09-17 | End: 2020-09-17

## 2020-09-17 RX ORDER — BUPIVACAINE HYDROCHLORIDE 2.5 MG/ML
4 INJECTION, SOLUTION INFILTRATION; PERINEURAL ONCE
Status: COMPLETED | OUTPATIENT
Start: 2020-09-17 | End: 2020-09-17

## 2020-09-17 RX ADMIN — METHYLPREDNISOLONE ACETATE 80 MG: 40 INJECTION, SUSPENSION INTRA-ARTICULAR; INTRALESIONAL; INTRAMUSCULAR; SOFT TISSUE at 10:08

## 2020-09-17 RX ADMIN — BUPIVACAINE HYDROCHLORIDE 10 MG: 2.5 INJECTION, SOLUTION INFILTRATION; PERINEURAL at 10:12

## 2020-09-17 RX ADMIN — LIDOCAINE HYDROCHLORIDE 4 ML: 10 INJECTION, SOLUTION INFILTRATION; PERINEURAL at 10:05

## 2020-09-17 RX ADMIN — BUPIVACAINE HYDROCHLORIDE 10 MG: 2.5 INJECTION, SOLUTION INFILTRATION; PERINEURAL at 10:13

## 2020-09-17 RX ADMIN — LIDOCAINE HYDROCHLORIDE 4 ML: 10 INJECTION, SOLUTION INFILTRATION; PERINEURAL at 10:06

## 2020-09-17 RX ADMIN — METHYLPREDNISOLONE ACETATE 80 MG: 40 INJECTION, SUSPENSION INTRA-ARTICULAR; INTRALESIONAL; INTRAMUSCULAR; SOFT TISSUE at 10:06

## 2020-09-17 NOTE — H&P
HISTORY AND PHYSICAL/PRE-SEDATION ASSESSMENT    Patient:  Maeve Miranda   :   1955  Medical Record No.:  [id number]   Date:   2020  Physician:  Faith Robertson M.D. Facility: [facility]    Chief Complaint:  Chronic back pain      History of Present Illness: The patient is a 77 y.o. male who presents with chronic achy low back pain. Pain worsen with prolonged activity and extension. Relief with resting. History of diagnostic MBB. Denies any lower extremity radiating pain, no progressive numbness tingling weakness.     Past Surgical History:    Past Surgical History:   Procedure Laterality Date    COLONOSCOPY  16    EPIDURAL STEROID INJECTION N/A 2019    MIDLINE LUMBAR FIVE SACRAL ONE EPIDURAL STEROID INJECTION SITE CONFIRMED BY FLUOROSCOPY performed by Faith Robertson MD at [facility] N/A 2019    MIDLINE LUMBAR FIVE SACRAL ONE EPIDURAL STEROID INJECTION SITE CONFIRMED BY FLUOROSCOPY performed by Faith Robertson MD at [facility] N/A 10/22/2019    MIDLINE LUMBAR FIVE SACRAL ONE EPIDURAL STEROID INJECTION SITE CONFIRMED BY FLUOROSCOPY performed by Faith Robertson MD at . [address] ARTHROSCOPY Left 16    partial medial and lateral meniscectomy, chondroplasty    KNEE SURGERY Right 10/15/13    RT KNEE PARTIAL MEDIAL, LATERAL MENISECTOMY AND CHONDROPLASTY WITH REMOVAL OF LOSSE HARDWARE    KNEE SURGERY Right 10/15/13    RT KNEE ARTHROSCOPY PARTIAL LATERAL AND MEDIAL MENISECTOMY WITH CHONDROPLASTY AND REMOVAL OF LOOSE BODIES    PAIN MANAGEMENT PROCEDURE N/A 2020    MIDLINE LUMBAR FIVE SACRAL ONE EPIDURAL STEROID INJECTION SITE CONFIRMED BY FLUOROSCOPY performed by Faith Robertson MD at [address] [place] [address] Bilateral 2020    BILATERAL LUMBAR THREE, FOUR, FIVE MEDIAL BRANCH BLOCK SITE CONFIRMED BY FLUOROSCOPY performed by Noemi Estrada MD at [address] Bilateral 9/1/2020    BILATERAL LUMBAR THREE LUMBAR FOUR LUMBAR FIVE LUMBAR MEDIAL BRANCH BLOCK SITE CONFIRMED BY FLUOROSCOPY performed by Noemi Estrada MD at [facility]  07/17/15    [facility]    SHOULDER SURGERY         Past Medical History:  Past Medical History:   Diagnosis Date    Actinic keratoses     Allergic rhinitis     Arthritis     Environmental allergies     Prostate cancer ([address]     PROSTATE       Allergies: Allergies   Allergen Reactions    Cephalexin Hives       Home Medications:    Prior to Admission medications    Medication Sig Start Date End Date Taking?  Authorizing Provider   diclofenac (VOLTAREN) 75 MG EC tablet Take 1 tablet by mouth 2 times daily 9/17/20   Malik Wills,    aspirin 81 MG chewable tablet Take 81 mg by mouth daily    Historical Provider, MD   loratadine-pseudoephedrine (ALLERGY RELIEF D-24)  MG per extended release tablet TAKE ONE TABLET BY MOUTH DAILY 7/21/20   Rashard Rangel MD   nitrofurantoin (MACRODANTIN) 100 MG capsule Take 100 mg by mouth daily    Historical Provider, MD   Ascorbic Acid (VITAMIN C) 1000 MG tablet Take 1,000 mg by mouth daily    Historical Provider, MD   Denosumab (PROLIA SC) Inject into the skin every 6 months    Historical Provider, MD   donepezil (ARICEPT) 10 MG tablet Take 1 tablet by mouth nightly 4/13/20   Rashard Rangel MD   meloxicam (MOBIC) 15 MG tablet TAKE 1 TABLET BY MOUTH ONCE DAILY 10/8/19   Rashard Rangel MD   diclofenac sodium 1 % GEL Apply 2 g topically 4 times daily 9/16/19   Rashard Rangel MD   Red Yeast Rice 600 MG TABS Take 600 mg by mouth 2 times daily    Historical Provider, MD   Turmeric 500 MG TABS Take 500 mg by mouth 2 times daily    Historical Provider, MD   Cinnamon 500 MG CAPS Take 500 mg by mouth 2 times daily    Historical Provider, MD   Omega-3 Fatty Acids (FISH OIL) 1000 MG CAPS Take 1,000 mg by mouth 2 times daily     Historical Provider, MD   POTASSIUM CHLORIDE PO Take by mouth daily 99 mg takes 1/2 tab unless having cramps then takes whole tab. Historical Provider, MD   fluticasone [facility]) 50 MCG/ACT nasal spray 2 sprays bilaterally qd 4/2/18   Dayton Gregorio MD   vitamin D 1000 units CAPS Take by mouth    Historical Provider, MD   Sennosides (SENNA LAXATIVE PO) Take by mouth    Historical Provider, MD   MAGNESIUM PO Take by mouth 2 times daily     Historical Provider, MD   Calcium Carb-Cholecalciferol 600-500 MG-UNIT CAPS Take 1,000 Units by mouth daily 4/4/16   Kaila Coker MD   leuprolide (LUPRON) 11.25 MG injection Inject 11.25 mg into the muscle every 6 months     Historical Provider, MD   acetaminophen (TYLENOL) 500 MG tablet Take 1 tablet by mouth every 6 hours as needed for Pain 7/9/15   Kaila Coker MD       Vitals: noted in chart    PHYSICAL EXAM:  HENT: Airway patent and reviewed  Cardiovascular: Normal rate, regular rhythm, normal heart sounds. Pulmonary/Chest: No wheezes. No rhonchi. No rales. Abdominal: Soft. Bowel sounds are normal. No distension. MALLAMPATI:           []   I. Complete visualization of the soft palate           [x]   II. Complete visualization of the uvula            []   III. Visualization of only the base of the uvula           []   IV. Soft palate is not visible     ASA CLASS:         []   I. Normal, healthy adult           [x]   II.  Mild systemic disease            []   III. Severe systemic disease    Impression:  1) Chronic low back pain, lumbar facet pain  2) Facet arthropathy/spondylosis    Plan:  1) Radiofrequency neurotomy with IV sedation. Procedure risk and benefits discussed in detail. Sedation plan:   [x]  Local              [x]  Minimal                  []  General anesthesia    Patient's condition acceptable for planned procedure/sedation.          Post Procedure Plan              Return to same level of

## 2020-09-17 NOTE — PROGRESS NOTES
I am evaluating this patient as a consult at the request of No referring provider defined for this encounter. Chief Complaint:  New Patient (joao knee pain, right worse than left, over a year. )      History of Present Illness:  Ramon Lopez is a 77 y.o. male here regarding bilateral knee pain, he has had progressive pain and stiffness over the years. He has trouble with stairs with bending. Has discomfort when walking more than 30 minutes this is becoming worse over the last 6 months. 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 He does take meloxicam for arthritis in the hands as well as the knees, this does offer some relief but he has been taking the meloxicam for years and seeing less and less improvement. He is prediabetic, does have a history of prostate cancer and just started Prolia for osteopenia.     Pain Assessment:       Medical History:  Past Medical History:   Diagnosis Date    Actinic keratoses     Allergic rhinitis     Arthritis     Environmental allergies     Prostate cancer ([facility] [address]     PROSTATE     Past Surgical History:   Procedure Laterality Date    COLONOSCOPY  2-16-16    EPIDURAL STEROID INJECTION N/A 2/26/2019    MIDLINE LUMBAR FIVE SACRAL ONE EPIDURAL STEROID INJECTION SITE CONFIRMED BY FLUOROSCOPY performed by Bobby Littlejohn MD at [facility] N/A 7/30/2019    MIDLINE LUMBAR FIVE SACRAL ONE EPIDURAL STEROID INJECTION SITE CONFIRMED BY FLUOROSCOPY performed by Bobby Littlejohn MD at [facility] N/A 10/22/2019    MIDLINE LUMBAR FIVE SACRAL ONE EPIDURAL STEROID INJECTION SITE CONFIRMED BY FLUOROSCOPY performed by Bobby Littlejohn MD at . [address] ARTHROSCOPY Left 4/26/16    partial medial and lateral meniscectomy, chondroplasty    KNEE SURGERY Right 10/15/13    RT KNEE PARTIAL MEDIAL, LATERAL MENISECTOMY AND CHONDROPLASTY WITH REMOVAL OF LOSSE HARDWARE    KNEE SURGERY Right 10/15/13    RT KNEE ARTHROSCOPY PARTIAL LATERAL AND MEDIAL MENISECTOMY WITH CHONDROPLASTY AND REMOVAL OF LOOSE BODIES    PAIN MANAGEMENT PROCEDURE N/A 5/18/2020    MIDLINE LUMBAR FIVE SACRAL ONE EPIDURAL STEROID INJECTION SITE CONFIRMED BY FLUOROSCOPY performed by Kassi Elmore MD at [facility] Bilateral 8/24/2020    BILATERAL LUMBAR THREE, FOUR, FIVE MEDIAL BRANCH BLOCK SITE CONFIRMED BY FLUOROSCOPY performed by Kassi Elmore MD at 0 [facility] Bilateral 9/1/2020    BILATERAL LUMBAR THREE LUMBAR FOUR LUMBAR FIVE LUMBAR MEDIAL BRANCH BLOCK SITE CONFIRMED BY FLUOROSCOPY performed by Kassi [facility]more MD at [facility]  07/17/15    DaVinci prostatectomy    SHOULDER SURGERY       Social History     Socioeconomic History    Marital status:      Spouse name: Not on file    Number of children: Not on file    Years of education: Not on file    Highest education level: Not on file   Occupational History    Not on file   Social Needs    Financial resource strain: Not on file    Food insecurity     Worry: Not on file     Inability: Not on file   [employer] needs     Medical: Not on file     Non-medical: Not on file   Tobacco Use    Smoking status: Never Smoker    Smokeless tobacco: Never Used    Tobacco comment: no history smoking, used to raise a lot of tobacco   Substance and Sexual Activity    Alcohol use:  Yes     Alcohol/week: 1.0 standard drinks     Types: 1 Shots of liquor per week     Comment: nightly    Drug use: No    Sexual activity: Yes     Partners: Female   Lifestyle    Physical activity     Days per week: Not on file     Minutes per session: Not on file    Stress: Not on file   Relationships    Social connections     Talks on phone: Not on file     Gets together: Not on file Attends Latter day service: Not on file     Active member of club or organization: Not on file     Attends meetings of clubs or organizations: Not on file     Relationship status: Not on file    Intimate partner violence     Fear of current or ex partner: Not on file     Emotionally abused: Not on file     Physically abused: Not on file     Forced sexual activity: Not on file   Other Topics Concern    Not on file   Social History Narrative    Not on file     Allergies   Allergen Reactions    Cephalexin Hives       Review of Systems:  Constitutional: negative  Respiratory: negative  Cardiovascular: negative  Musculoskeletal:negative except for New Patient (joao knee pain, right worse than left, over a year. )    Relevant review of systems reviewed and available in the patient's chart in media tab    Vital Signs:  Vitals:    09/17/20 0932   Weight: 185 lb (83.9 kg)   Height: 5' 5\" (1.651 m)         General Exam:   Constitutional: Patient is adequately groomed with no evidence of malnutrition  Mental Status: The patient is oriented to time, place and person. The patient's mood and affect are appropriate. Vascular: Examination reveals no swelling or calf tenderness. Peripheral pulses are palpable and 2+.    bilateral Knee Examination  Inspection:   No gross deformities noted. mild swelling noted. No erythema or ecchymosis. Skin is intact. Palpation: positive Tenderness to palpation along the medial joint line, positive Tenderness to palpation along lateral joint line, negative Tenderness to palpation along medial and lateral facets of undersurface of the patella    Range of Motion:  0-135    Strength:  Able to do SLR    Special Tests:  ACL, MCL, PCL, LCL are intact with stress exam.  There positive crepitus noted with range of motion under the patella. A positive grind test.  positive Jacy's and Apley compression test.       Skin: There are no rashes, ulcerations or lesions.     Gait: Normal gait, no assistive devices    Reflex: not tested    Additional Examinations:    LUMBAR SPINE: The skin is warm and dry. There is no swelling, warmth, or erythema. Range of motion is within normal limits. There is no paraspinal or spinous process tenderness. Ipsilateral and contralateral straight leg raising tests are negative. The distal neurovascular exam is grossly intact and symmetric. X-RAYS: 4 views weightbearing AP, lateral. PA and sunrise of the bilateral knee were obtained and reviewed, they show no periosteal reaction, medullary lesions, or osteopenia. Tricompartmental arthritic changes with sclerosis, joint space narrowing laterally, subchondral cysts and marginal osteophytes noted. No evidence of fracture or dislocation. Assessment : Bilateral knee osteoarthritis    Impression:  Encounter Diagnoses   Name Primary?  Pain in both knees, unspecified chronicity Yes    Primary osteoarthritis of both knees        Office Procedures:  Orders Placed This Encounter   Procedures    XR KNEE LEFT (MIN 4 VIEWS)     Order Specific Question:   Reason for exam:     Answer:   LEFT KNEE PAIN    XR KNEE RIGHT (MIN 4 VIEWS)     Order Specific Question:   Reason for exam:     Answer:   RIGHT KNEE PAIN     No orders of the defined types were placed in this encounter. Treatment Plan: I had a lengthy discussion with the patient about the pathophysiology of stage knee osteoarthritis and their treatment options. The 1st option would be to pursue no further treatment and continue living with their knee pain and dysfunction. The 2nd treatment option would be to pursue conservative treatment, including physical therapy, injections and oral medications. The 3rd option would be to pursue surgical intervention including total knee arthroplasty  Risks and benefits of each option were discussed in great detail with the patient, at this time the patient would like to pursue conservative treatment.   We will discontinue the meloxicam I started him on diclofenac twice a day. Also provided cortisone injection today. We discussed the use of Visco supplementation injections in the near future. The risks and benefits of an injection were discussed with the patient. The patient had full opportunity to ask questions and all were answered. The patient then provided verbal informed consent. The skin was then prepped with betadine solution and alcohol. Under aseptic conditions, the  bilateral knees were injected with 4cc of 1% lidocaine, 4cc of 0.25% marcaine and 80 mg of Depomedrol. There were no immediate complications following the injection. The patient was advised of the possibility of injection site reaction and instructed to apply ice to the area.           Jonah Hills

## 2020-09-21 ENCOUNTER — HOSPITAL ENCOUNTER (OUTPATIENT)
Age: 65
Setting detail: OUTPATIENT SURGERY
Discharge: HOME | End: 2020-09-21
Attending: PHYSICAL MEDICINE & REHABILITATION | Admitting: PHYSICAL MEDICINE & REHABILITATION
Payer: MEDICARE

## 2020-09-21 VITALS
RESPIRATION RATE: 16 BRPM | DIASTOLIC BLOOD PRESSURE: 85 MMHG | OXYGEN SATURATION: 97 % | TEMPERATURE: 97.1 F | SYSTOLIC BLOOD PRESSURE: 146 MMHG | WEIGHT: 185 LBS | HEIGHT: 65 IN | HEART RATE: 80 BPM | BODY MASS INDEX: 30.82 KG/M2

## 2020-09-21 PROCEDURE — 99152 MOD SED SAME PHYS/QHP 5/>YRS: CPT | Performed by: PHYSICAL MEDICINE & REHABILITATION

## 2020-09-21 PROCEDURE — 2500000003 HC RX 250 WO HCPCS: Performed by: PHYSICAL MEDICINE & REHABILITATION

## 2020-09-21 PROCEDURE — 99153 MOD SED SAME PHYS/QHP EA: CPT | Performed by: PHYSICAL MEDICINE & REHABILITATION

## 2020-09-21 PROCEDURE — 7100000010 HC PHASE II RECOVERY - FIRST 15 MIN: Performed by: PHYSICAL MEDICINE & REHABILITATION

## 2020-09-21 PROCEDURE — 3600000012 HC SURGERY LEVEL 2 ADDTL 15MIN: Performed by: PHYSICAL MEDICINE & REHABILITATION

## 2020-09-21 PROCEDURE — 3600000002 HC SURGERY LEVEL 2 BASE: Performed by: PHYSICAL MEDICINE & REHABILITATION

## 2020-09-21 PROCEDURE — 7100000011 HC PHASE II RECOVERY - ADDTL 15 MIN: Performed by: PHYSICAL MEDICINE & REHABILITATION

## 2020-09-21 PROCEDURE — 6360000002 HC RX W HCPCS: Performed by: PHYSICAL MEDICINE & REHABILITATION

## 2020-09-21 PROCEDURE — 2580000003 HC RX 258: Performed by: PHYSICAL MEDICINE & REHABILITATION

## 2020-09-21 PROCEDURE — 2709999900 HC NON-CHARGEABLE SUPPLY: Performed by: PHYSICAL MEDICINE & REHABILITATION

## 2020-09-21 RX ORDER — SODIUM CHLORIDE, SODIUM LACTATE, POTASSIUM CHLORIDE, CALCIUM CHLORIDE 600; 310; 30; 20 MG/100ML; MG/100ML; MG/100ML; MG/100ML
INJECTION, SOLUTION INTRAVENOUS CONTINUOUS
Status: DISCONTINUED | OUTPATIENT
Start: 2020-09-21 | End: 2020-09-21 | Stop reason: HOSPADM

## 2020-09-21 RX ORDER — FENTANYL CITRATE 50 UG/ML
INJECTION, SOLUTION INTRAMUSCULAR; INTRAVENOUS
Status: DISCONTINUED
Start: 2020-09-21 | End: 2020-09-21 | Stop reason: HOSPADM

## 2020-09-21 RX ORDER — MIDAZOLAM HYDROCHLORIDE 1 MG/ML
INJECTION INTRAMUSCULAR; INTRAVENOUS PRN
Status: DISCONTINUED | OUTPATIENT
Start: 2020-09-21 | End: 2020-09-21 | Stop reason: ALTCHOICE

## 2020-09-21 RX ORDER — MIDAZOLAM HYDROCHLORIDE 1 MG/ML
INJECTION INTRAMUSCULAR; INTRAVENOUS
Status: DISCONTINUED
Start: 2020-09-21 | End: 2020-09-21 | Stop reason: HOSPADM

## 2020-09-21 RX ORDER — FENTANYL CITRATE 50 UG/ML
INJECTION, SOLUTION INTRAMUSCULAR; INTRAVENOUS PRN
Status: DISCONTINUED | OUTPATIENT
Start: 2020-09-21 | End: 2020-09-21 | Stop reason: ALTCHOICE

## 2020-09-21 RX ORDER — LIDOCAINE HYDROCHLORIDE 20 MG/ML
INJECTION, SOLUTION EPIDURAL; INFILTRATION; INTRACAUDAL; PERINEURAL PRN
Status: DISCONTINUED | OUTPATIENT
Start: 2020-09-21 | End: 2020-09-21 | Stop reason: ALTCHOICE

## 2020-09-21 RX ADMIN — SODIUM CHLORIDE, POTASSIUM CHLORIDE, SODIUM LACTATE AND CALCIUM CHLORIDE: 600; 310; 30; 20 INJECTION, SOLUTION INTRAVENOUS at 08:44

## 2020-09-21 ASSESSMENT — PAIN DESCRIPTION - DESCRIPTORS: DESCRIPTORS: ACHING

## 2020-09-21 NOTE — OP NOTE
POST SEDATION ASSESSMENT      Patient:  Ivette Paul   :  1955  Medical Record No.:  [id number]   Date:  2020  Physician:  Giovani Lorenzo M.D.       Patient location: Recovery  Level of consciousness: Awake, Alert, Oriented  Pain Control: Good  Respiration: Adequate  Post-op assessment: No sedation complications    Last Vitals:   Vitals:    20 0915   BP: (!) 144/88   Pulse: 86   Resp: 16   Temp:    SpO2: 95%     Post-op Vitals: Stable    F Dakota Arshad  9:29 AM

## 2020-09-21 NOTE — OP NOTE
Patient:  Luis M Villalpando   Medical Record #:  [id number]   Date:  9/21/2020  Physician:  Noemi Estrada M.D. Facility: [facility]     Pre-op diagnosis: Lumbar spondylosis, Facet pain syndrome, facet arthropathy, facet synovitis  Post-op diagnosis:  same  Procedure: Bilateral L3, L4, L5 radiofrequency neurotomy   Anesthesia: Conscious sedation with 2mg Versed and 50 µg fentanyl     Procedure Note:  Risk and benefits were explained. Informed consent was obtained. The patient was placed in the prone position and prepped in the normal sterile manner with Betadine. The superficial skin was anesthetized with 1% Lidocaine. With fluoroscopic guidance, two individual 20 gauge curved tipped radiofrequency needles were directed at the junctions of the left L4 and L5 transverse processes and superior articulating processes. A third needle was then directed to the left junction of the superior articulating process and the sacral ala of S1.  Multiplane imaging confirmed appropriate placement. Lateral view confirmed the needle tips were posterior and inferior  To the neural foramen. Motor stimulation at 2 hertz at 2 volt produced no contractions in the left leg. Each site was then anesthetized with .5 cc 1% Lidocaine. Then radiofrequency lesioning was performed for 60 seconds at 90 degrees Celsius at each level. The patient tolerated the procedure well. Discharge instructions were given. Follow up is arranged. The same procedure was repeated on the contralateral side.      Estimated blood loss: none    VIVIANA Colon M.D.

## 2020-10-12 ENCOUNTER — VIRTUAL VISIT (OUTPATIENT)
Dept: ORTHOPEDIC SURGERY | Age: 65
End: 2020-10-12
Payer: MEDICARE

## 2020-10-12 PROCEDURE — 99441 PR PHYS/QHP TELEPHONE EVALUATION 5-10 MIN: CPT | Performed by: PHYSICIAN ASSISTANT

## 2020-10-12 NOTE — PROGRESS NOTES
TELEPHONE VISIT: SPINE    CHIEF COMPLAINT:    Chief Complaint   Patient presents with    Back Pain       Patient provided verbal consent to proceed with telephone visit; aware he/she may receive a bill for this telephone service, depending on insurance coverage. The patient is being evaluated by a telephone encounter due to the restrictions of the COVID-19 pandemic. A caregiver was present when appropriate. All issues as below were discussed and addressed, but no physical exam was performed unless allowed by visual confirmation. If it was felt that patient should be evaluated in clinic then they were directed there. Due to this being a TeleHealth encounter (During WGJEE-23 public health emergency), evaluation of the following organ systems was limited to: spine. Pursuant to the emergency declaration under the [facility], 1135 waiver authority and the [employer] Act, this Virtual Visit was conducted with patient's verbal consent, to reduce the risk of exposure to COVID-19 and provide necessary medical care. The patient (and/or legal guardian) has also been advised to contact this office for worsening conditions or problems, and seek emergency medical treatment and/or call 911 if deemed necessary. Individuals present during telemedicine consultation-[facility] & the patient     HISTORY OF PRESENT ILLNESS:                The patient is a 77 y.o. male history of prostate cancer, consent granted for telephone visit to follow-up after bilateral L3-L4-L5 radiofrequency neurotomy from 9/21/20 for chronic aching low back pain. Symptoms are increased with any prolonged activity walking standing or driving. Relief with resting. He reports >90% improvement since the procedure with improved function. Other conservative care includes PT, chiropractics, inversion table, NSAIDs, Tylenol, intermittent AMPARO's.  He currently denies significant low back or radiating pain at this time. He denies any recent bowel or bladder dysfunction or recent trauma. No recent fevers chills infections. No side effects to the procedure.   Overall doing very well    Current/Past Treatment:   · Physical Therapy: HEP  · Chiropractic: YES  · Injection:    7/30/19 Midline L5/S1 interlaminar epidural injection #2--95%  2/26/19 Midline L5/S1 interlaminar epidural injection #1--90% IMPROVED   10/22/19 Midline L5/S1 interlaminar epidural injection #3--95% IMPROVED      5/18/2020 Right L5/S1 interlaminar epidural injection--100%    8/24/2020 Bilateral L3, L4, and L5 medial branch blocks #1 of 2--80% DX relief     9/1/2020 Bilateral L3, L4, and L5 medial branch blocks #2--100% DX relied     9/21/2020 Bilateral L3, L4, L5 radiofrequency neurotomy--100%    Medications:            NSAIDS: Mobic            Muscle relaxer:              Steriods:              Neuropathic medications:              Opioids:            Other:   · Surgery/Consult: no     Work Status/Functionality: [employer]    Medical history:  Past Medical History:   Diagnosis Date    Actinic keratoses     Allergic rhinitis     Arthritis     Environmental allergies     Prostate cancer ([facility] [address]     PROSTATE       Past Surgical History:     Past Surgical History:   Procedure Laterality Date    COLONOSCOPY  2-16-16    EPIDURAL STEROID INJECTION N/A 2/26/2019    MIDLINE LUMBAR FIVE SACRAL ONE EPIDURAL STEROID INJECTION SITE CONFIRMED BY FLUOROSCOPY performed by Hermilo Sommers MD at [facility] N/A 7/30/2019    MIDLINE LUMBAR FIVE SACRAL ONE EPIDURAL STEROID INJECTION SITE CONFIRMED BY FLUOROSCOPY performed by Hermilo Sommers MD at [facility] N/A 10/22/2019    MIDLINE LUMBAR FIVE SACRAL ONE EPIDURAL STEROID INJECTION SITE CONFIRMED BY FLUOROSCOPY performed by Hermilo Sommers MD at . [address] ARTHROSCOPY Left 4/26/16    partial medial and lateral meniscectomy, chondroplasty    KNEE SURGERY Right 10/15/13    RT KNEE PARTIAL MEDIAL, LATERAL MENISECTOMY AND CHONDROPLASTY WITH REMOVAL OF LOSSE HARDWARE    KNEE SURGERY Right 10/15/13    RT KNEE ARTHROSCOPY PARTIAL LATERAL AND MEDIAL MENISECTOMY WITH CHONDROPLASTY AND REMOVAL OF LOOSE BODIES    NERVE SURGERY Bilateral 9/21/2020    BILATERAL LUMBAR THREE LUMBAR FOUR LUMBAR FIVE RADIOFREQUENCY ABLATION SITE CONFIRMED BY FLUOROSCOPY performed by Denise Dwyer MD at [facility] N/A 5/18/2020    MIDLINE LUMBAR FIVE SACRAL ONE EPIDURAL STEROID INJECTION SITE CONFIRMED BY FLUOROSCOPY performed by Denise Dwyer MD at [address] [place] [address] Bilateral 8/24/2020    BILATERAL LUMBAR THREE, FOUR, FIVE MEDIAL BRANCH BLOCK SITE CONFIRMED BY FLUOROSCOPY performed by Denise Dwyer MD at [address] [place] [address] Bilateral 9/1/2020    BILATERAL LUMBAR THREE LUMBAR FOUR LUMBAR FIVE LUMBAR MEDIAL BRANCH BLOCK SITE CONFIRMED BY FLUOROSCOPY performed by Denise Dwyer MD at [facility]  07/17/15    DaVinci prostatectomy    SHOULDER SURGERY       Current Medications:     Current Outpatient Medications:     diclofenac (VOLTAREN) 75 MG EC tablet, Take 1 tablet by mouth 2 times daily, Disp: 60 tablet, Rfl: 3    aspirin 81 MG chewable tablet, Take 81 mg by mouth daily, Disp: , Rfl:     loratadine-pseudoephedrine (ALLERGY RELIEF D-24)  MG per extended release tablet, TAKE ONE TABLET BY MOUTH DAILY, Disp: 90 tablet, Rfl: 3    nitrofurantoin (MACRODANTIN) 100 MG capsule, Take 100 mg by mouth daily, Disp: , Rfl:     Ascorbic Acid (VITAMIN C) 1000 MG tablet, Take 1,000 mg by mouth daily, Disp: , Rfl:     Denosumab (PROLIA SC), Inject into the skin every 6 months, Disp: , Rfl:     donepezil (ARICEPT) 10 MG tablet, Take 1 tablet by mouth nightly,

## 2020-11-24 ENCOUNTER — OFFICE VISIT (OUTPATIENT)
Dept: ORTHOPEDIC SURGERY | Age: 65
End: 2020-11-24
Payer: MEDICARE

## 2020-11-24 ENCOUNTER — TELEPHONE (OUTPATIENT)
Dept: ORTHOPEDIC SURGERY | Age: 65
End: 2020-11-24

## 2020-11-24 VITALS — HEIGHT: 65 IN | BODY MASS INDEX: 30.82 KG/M2 | WEIGHT: 185 LBS

## 2020-11-24 PROCEDURE — G8417 CALC BMI ABV UP PARAM F/U: HCPCS | Performed by: ORTHOPAEDIC SURGERY

## 2020-11-24 PROCEDURE — 99213 OFFICE O/P EST LOW 20 MIN: CPT | Performed by: ORTHOPAEDIC SURGERY

## 2020-11-24 PROCEDURE — 20610 DRAIN/INJ JOINT/BURSA W/O US: CPT | Performed by: ORTHOPAEDIC SURGERY

## 2020-11-24 PROCEDURE — G8428 CUR MEDS NOT DOCUMENT: HCPCS | Performed by: ORTHOPAEDIC SURGERY

## 2020-11-24 PROCEDURE — 1036F TOBACCO NON-USER: CPT | Performed by: ORTHOPAEDIC SURGERY

## 2020-11-24 PROCEDURE — 3017F COLORECTAL CA SCREEN DOC REV: CPT | Performed by: ORTHOPAEDIC SURGERY

## 2020-11-24 PROCEDURE — 1123F ACP DISCUSS/DSCN MKR DOCD: CPT | Performed by: ORTHOPAEDIC SURGERY

## 2020-11-24 PROCEDURE — G8484 FLU IMMUNIZE NO ADMIN: HCPCS | Performed by: ORTHOPAEDIC SURGERY

## 2020-11-24 PROCEDURE — 4040F PNEUMOC VAC/ADMIN/RCVD: CPT | Performed by: ORTHOPAEDIC SURGERY

## 2020-11-24 RX ORDER — HYALURONATE SODIUM 10 MG/ML
20 SYRINGE (ML) INTRAARTICULAR ONCE
Status: COMPLETED | OUTPATIENT
Start: 2020-11-24 | End: 2020-11-24

## 2020-11-24 RX ADMIN — Medication 20 MG: at 09:26

## 2020-11-24 RX ADMIN — Medication 20 MG: at 09:25

## 2020-11-24 NOTE — PROGRESS NOTES
Subjective    Patient ID: Marcus Warner is a 77 y.o.. male. Chief Complaint   Patient presents with    Follow-up     CK RADHA KNEE       Patient presents today for the 1 injection of Euflexxa . Pt has been previously diagnosed with osteoarthritis of the knee as documented in the prior progress notes. This injection is for the patients Bilateral knee. Patient denies and fevers, chills, recent infections, or new injuries to the knee. Pain Assessment:  Pain Assessment  Location of Pain: Knee  Location Modifiers: Right, Left  Quality of Pain: Aching  Duration of Pain: A few minutes  Frequency of Pain: Intermittent  Relieving Factors: Rest, Ice  Result of Injury: No  Work-Related Injury: No  Are there other pain locations you wish to document?: No    Patient's medications, allergies, past medical, surgical, social and family histories were reviewed and updated as appropriate. Physical Exam:    The patient does not have  pain on motion, there is not an effusion, there is tenderness over the  medial, lateral region. No erythema noted. Sensation intact to touch. Pulses present distally. Procedure Note:    The patients Bilateral knee was initially prepped using Betadine swab. The superior lateral aspect of the knee was prepped and used for this injection. Under aseptic technique the soft tissues were anesthetized topically. Following adequate anesthesia, the 2ml of  Euflexxa injection was performed. This was injected directly into the joint capsule of the knee. No complications were encountered and the patient tolerated the procedure well. A band aid was placed over the injection site following the procedure. Diagnosis Orders   1.  Primary osteoarthritis of both knees  sodium hyaluronate (viscosup) injection 20 mg    [place] ARTHROCENTESIS ASPIR&/INJ MAJOR JT/BURSA W/O US    EUFLEXXA INJ PER DOSE    sodium hyaluronate (viscosup) injection 20 mg       [place] ARTHROCENTESIS ASPIR&/INJ MAJOR JT/BURSA W/O US  EUFLEXXA INJ PER DOSE  Assessment:  1)  Euflexxa  injection of the Bilateral knee  2) Osteoarthritis    Plan:  1) ice, elevation, gentle range of motion as needed for swelling or stiffness of the knee  2) NSAIDs for any pain after injection   3) F/U 1 week        Lary Sandhoff

## 2020-11-24 NOTE — PROGRESS NOTES
Subjective    Patient ID: Ruby Angel is a 77 y.o.. male. Chief Complaint   Patient presents with    Follow-up     CK RADHA KNEE       Patient presents today for the 1 injection of Euflexxa . Pt has been previously diagnosed with osteoarthritis of the knee as documented in the prior progress notes. This injection is for the patients Bilateral knee. Patient denies and fevers, chills, recent infections, or new injuries to the knee. Pain Assessment:  Pain Assessment  Location of Pain: Knee  Location Modifiers: Right, Left  Quality of Pain: Aching  Duration of Pain: A few minutes  Frequency of Pain: Intermittent  Relieving Factors: Rest, Ice  Result of Injury: No  Work-Related Injury: No  Are there other pain locations you wish to document?: No    Patient's medications, allergies, past medical, surgical, social and family histories were reviewed and updated as appropriate. Physical Exam:    The patient does have  pain on motion, there is not an effusion, there is tenderness over the  medial, lateral region. No erythema noted. Sensation intact to touch. Pulses present distally. Procedure Note:    The patients Bilateral knee was initially prepped using Betadine swab. The superior lateral aspect of the knee was prepped and used for this injection. Under aseptic technique the soft tissues were anesthetized topically. Following adequate anesthesia, the 2ml of  Euflexxa injection was performed. This was injected directly into the joint capsule of the knee. No complications were encountered and the patient tolerated the procedure well. A band aid was placed over the injection site following the procedure. Diagnosis Orders   1.  Primary osteoarthritis of both knees  sodium hyaluronate (viscosup) injection 20 mg    [place] ARTHROCENTESIS ASPIR&/INJ MAJOR JT/BURSA W/O     EUFLEXXA INJ PER DOSE    sodium hyaluronate (viscosup) injection 20 mg       [place] ARTHROCENTESIS ASPIR&/INJ MAJOR JT/BURSA W/O US  EUFLEXXA INJ PER DOSE  Assessment:  1)  Euflexxa  injection of the Bilateral knee  2) Osteoarthritis    Plan:  1) ice, elevation, gentle range of motion as needed for swelling or stiffness of the knee  2) NSAIDs for any pain after injection   3) F/U ONE WEEK #2 INJ RADHA [address]

## 2020-11-25 ENCOUNTER — TELEPHONE (OUTPATIENT)
Dept: ORTHOPEDIC SURGERY | Age: 65
End: 2020-11-25

## 2020-12-01 ENCOUNTER — OFFICE VISIT (OUTPATIENT)
Dept: ORTHOPEDIC SURGERY | Age: 65
End: 2020-12-01
Payer: MEDICARE

## 2020-12-01 VITALS — BODY MASS INDEX: 30.82 KG/M2 | WEIGHT: 185 LBS | HEIGHT: 65 IN

## 2020-12-01 PROCEDURE — 20610 DRAIN/INJ JOINT/BURSA W/O US: CPT | Performed by: ORTHOPAEDIC SURGERY

## 2020-12-01 RX ORDER — HYALURONATE SODIUM 10 MG/ML
20 SYRINGE (ML) INTRAARTICULAR ONCE
Status: COMPLETED | OUTPATIENT
Start: 2020-12-01 | End: 2020-12-01

## 2020-12-01 RX ADMIN — Medication 20 MG: at 09:12

## 2020-12-01 RX ADMIN — Medication 20 MG: at 09:11

## 2020-12-01 NOTE — PROGRESS NOTES
Subjective    Patient ID: Riri Perry is a 77 y.o.. male. Chief Complaint   Patient presents with    Injections     #2 INJ RADHA KNEE EUFLEXXA       Patient presents today for the 2 injection of Euflexxa . Pt has been previously diagnosed with osteoarthritis of the knee as documented in the prior progress notes. This injection is for the patients Bilateral knee. Patient denies and fevers, chills, recent infections, or new injuries to the knee. Pain Assessment:       Patient's medications, allergies, past medical, surgical, social and family histories were reviewed and updated as appropriate. Physical Exam:    The patient does have  pain on motion, there is not an effusion, there is tenderness over the  medial, lateral region. No erythema noted. Sensation intact to touch. Pulses present distally. Procedure Note:    The patients Bilateral knee was initially prepped using Betadine swab. The superior lateral aspect of the knee was prepped and used for this injection. Under aseptic technique the soft tissues were anesthetized topically. Following adequate anesthesia, the 2ml of  Euflexxa injection was performed. This was injected directly into the joint capsule of the knee. No complications were encountered and the patient tolerated the procedure well. A band aid was placed over the injection site following the procedure. Diagnosis Orders   1.  Primary osteoarthritis of both knees  [place] ARTHROCENTESIS ASPIR&/INJ MAJOR JT/BURSA W/O US    sodium hyaluronate (viscosup) injection 20 mg    sodium hyaluronate (viscosup) injection 20 mg       [place] ARTHROCENTESIS ASPIR&/INJ MAJOR JT/BURSA W/O US  Assessment:  1)  Euflexxa  injection of the Bilateral knee  2) Osteoarthritis    Plan:  1) ice, elevation, gentle range of motion as needed for swelling or stiffness of the knee  2) NSAIDs for any pain after injection   3) F/U ONE WEEK #3 INJ RADHA KNEE EUFLEXXA      Vera Loots

## 2020-12-02 ENCOUNTER — HOSPITAL ENCOUNTER (OUTPATIENT)
Age: 65
Discharge: HOME | End: 2020-12-02
Payer: MEDICARE

## 2020-12-02 PROCEDURE — 36415 COLL VENOUS BLD VENIPUNCTURE: CPT

## 2020-12-02 PROCEDURE — 84153 ASSAY OF PSA TOTAL: CPT

## 2020-12-03 LAB — PROSTATE SPECIFIC ANTIGEN: <0.01 NG/ML (ref 0–4)

## 2020-12-08 ENCOUNTER — OFFICE VISIT (OUTPATIENT)
Dept: ORTHOPEDIC SURGERY | Age: 65
End: 2020-12-08
Payer: MEDICARE

## 2020-12-08 VITALS — WEIGHT: 185 LBS | BODY MASS INDEX: 30.82 KG/M2 | HEIGHT: 65 IN

## 2020-12-08 PROCEDURE — 20610 DRAIN/INJ JOINT/BURSA W/O US: CPT | Performed by: ORTHOPAEDIC SURGERY

## 2020-12-08 RX ORDER — DICLOFENAC SODIUM 75 MG/1
75 TABLET, DELAYED RELEASE ORAL 2 TIMES DAILY
Qty: 180 TABLET | Refills: 3 | Status: SHIPPED | OUTPATIENT
Start: 2020-12-08 | End: 2021-04-12

## 2020-12-08 RX ORDER — HYALURONATE SODIUM 10 MG/ML
20 SYRINGE (ML) INTRAARTICULAR ONCE
Status: COMPLETED | OUTPATIENT
Start: 2020-12-08 | End: 2020-12-08

## 2020-12-08 RX ADMIN — Medication 20 MG: at 10:49

## 2020-12-08 RX ADMIN — Medication 20 MG: at 10:48

## 2020-12-08 NOTE — PROGRESS NOTES
Subjective    Patient ID: Micheline Hammer is a 77 y.o.. male. Chief Complaint   Patient presents with    Injections     #3 EUFLEXXA INJECTION RADHA [address]       Patient presents today for the 3 injection of Euflexxa . Pt has been previously diagnosed with osteoarthritis of the knee as documented in the prior progress notes. This injection is for the patients Bilateral knee. Patient denies and fevers, chills, recent infections, or new injuries to the knee. Pain Assessment:  Pain Assessment  Location of Pain: Knee  Severity of Pain: 0  Result of Injury: No  Work-Related Injury: No  Are there other pain locations you wish to document?: No    Patient's medications, allergies, past medical, surgical, social and family histories were reviewed and updated as appropriate. Physical Exam:    The patient does have  pain on motion, there is not an effusion, there is tenderness over the  medial, lateral region. No erythema noted. Sensation intact to touch. Pulses present distally. Procedure Note:    The patients Bilateral knee was initially prepped using Betadine swab. The superior lateral aspect of the knee was prepped and used for this injection. Under aseptic technique the soft tissues were anesthetized topically. Following adequate anesthesia, the 2ml of  Euflexxa injection was performed. This was injected directly into the joint capsule of the knee. No complications were encountered and the patient tolerated the procedure well. A band aid was placed over the injection site following the procedure. Diagnosis Orders   1.  Primary osteoarthritis of both knees  [place] ARTHROCENTESIS ASPIR&/INJ MAJOR JT/BURSA W/O US    sodium hyaluronate (viscosup) injection 20 mg    sodium hyaluronate (viscosup) injection 20 mg    diclofenac (VOLTAREN) 75 MG EC tablet       [place] ARTHROCENTESIS ASPIR&/INJ MAJOR JT/BURSA W/O US  Assessment:  1)  Euflexxa  injection of the Bilateral knee  2) Osteoarthritis    Plan:  1) ice, elevation, gentle range of motion as needed for swelling or stiffness of the knee  2) NSAIDs for any pain after injection   3) F/U PRN      Kathlean Brunner

## 2020-12-23 ENCOUNTER — OFFICE VISIT (OUTPATIENT)
Dept: ORTHOPEDIC SURGERY | Age: 65
End: 2020-12-23
Payer: MEDICARE

## 2020-12-23 VITALS — HEIGHT: 66 IN | WEIGHT: 185 LBS | BODY MASS INDEX: 29.73 KG/M2

## 2020-12-23 PROCEDURE — 3017F COLORECTAL CA SCREEN DOC REV: CPT | Performed by: PODIATRIST

## 2020-12-23 PROCEDURE — 1123F ACP DISCUSS/DSCN MKR DOCD: CPT | Performed by: PODIATRIST

## 2020-12-23 PROCEDURE — 4040F PNEUMOC VAC/ADMIN/RCVD: CPT | Performed by: PODIATRIST

## 2020-12-23 PROCEDURE — 99203 OFFICE O/P NEW LOW 30 MIN: CPT | Performed by: PODIATRIST

## 2020-12-23 PROCEDURE — L3040 FT ARCH SUPRT PREMOLD LONGIT: HCPCS | Performed by: PODIATRIST

## 2020-12-23 PROCEDURE — G8484 FLU IMMUNIZE NO ADMIN: HCPCS | Performed by: PODIATRIST

## 2020-12-23 PROCEDURE — 1036F TOBACCO NON-USER: CPT | Performed by: PODIATRIST

## 2020-12-23 PROCEDURE — G8417 CALC BMI ABV UP PARAM F/U: HCPCS | Performed by: PODIATRIST

## 2020-12-23 PROCEDURE — G8427 DOCREV CUR MEDS BY ELIG CLIN: HCPCS | Performed by: PODIATRIST

## 2020-12-23 RX ORDER — PREDNISONE 10 MG/1
TABLET ORAL
Qty: 26 TABLET | Refills: 0 | Status: ON HOLD | OUTPATIENT
Start: 2020-12-23 | End: 2021-01-04

## 2020-12-23 NOTE — PROGRESS NOTES
HISTORY OF PRESENT ILLNESS:  This is an intial visit for a 76-year-old male with a chief complaint of pain to the top of the left foot and the front of the left ankle. He has been having pain for approximately 2 months. He cannot recall any direct injury however he does recall having mild pain but then 1 day experiencing a sharp snapping sensation on top of his left foot. That actually seemed to relieve the pain. No history of trauma is related. The pain is worsened with activity and relieved with rest. It is described as mostly a dull achy type pain but can be sharper at times. The pain has slowly progressed to the point where he is using a cane and limping now. FAMILY HISTORY:  Documented in chart. SOCIAL HISTORY:  Documented in chart. REVIEW OF SYSTEMS: Prostate CA in remission, osteoarthritis otherwise, the patient denies any fever, chills, or night sweats. The patient also denies developing any type of rash. The patient denies any problems with cardiovascular, pulmonary, gastrointestinal, neurologic, urologic, genitourinary, psychiatric, dermatologic, and HEENT systems. PHYSICAL EXAM:  The majority of palpable tenderness is over the dorsal lateral aspect of the left midfoot. There is very mild edema present. There is no erythema or ecchymosis present. The right foot exam is unremarkable. The patient has palpable pedal pulses bilateral.  The sensation is grossly intact bilateral.    X-RAYS: 3 weightbearing views of the left foot were obtained. There is mild joint space narrowing of the second TMT joint. He has a pronated foot type which is semirigid. No acute fractures or dislocations are noted. 2 weightbearing x-ray views of the left ankle were also taken. These do not demonstrate any acute fracture or dislocation. ASSESSMENT:  Midfoot synovitis and Osteoarthritis, left foot.   Tenosynovitis, left foot and ankle PLAN:  I educated the patient on the pathology and its treatment options. A set of PowerStep foot orthotics was dispensed. We discussed the appropriate use of them. I advised the patient to use them full time in a supportive shoe that will fit them. A prednisone 10 mg taper was prescribed for the short-term. We discussed the potential adverse reactions. I explained to the patient that this is essentially an overuse type injury secondary to the activity level, foot function, and weight. I discussed the recurrent nature of the episodes of pain and chronic nature of this problem. Both short and long term treatment was discussed. I'll see him back in approximately 2 weeks for reevaluation. Procedures    Powerstep Protech Full Length Insert     Patient was prescribed Powerstep Protech Full Length Inserts. The bilateral foot will require stabilization / support from this semi-rigid / rigid orthosis to improve their function. The orthosis will assist in protecting the affected area, provide functional support and facilitate healing. The patient was educated and fit by a healthcare professional with expert knowledge and specialization in brace application while under the direct supervision of the treating physician. Verbal and written instructions for the use of and application of this item were provided. They were instructed to contact the office immediately should the brace result in increased pain, decreased sensation, increased swelling or worsening of the condition.

## 2020-12-28 ENCOUNTER — OFFICE VISIT (OUTPATIENT)
Dept: ORTHOPEDIC SURGERY | Age: 65
End: 2020-12-28
Payer: MEDICARE

## 2020-12-28 VITALS — HEIGHT: 66 IN | BODY MASS INDEX: 29.73 KG/M2 | WEIGHT: 185 LBS

## 2020-12-28 PROCEDURE — 1036F TOBACCO NON-USER: CPT | Performed by: PHYSICAL MEDICINE & REHABILITATION

## 2020-12-28 PROCEDURE — G8417 CALC BMI ABV UP PARAM F/U: HCPCS | Performed by: PHYSICAL MEDICINE & REHABILITATION

## 2020-12-28 PROCEDURE — 1123F ACP DISCUSS/DSCN MKR DOCD: CPT | Performed by: PHYSICAL MEDICINE & REHABILITATION

## 2020-12-28 PROCEDURE — 4040F PNEUMOC VAC/ADMIN/RCVD: CPT | Performed by: PHYSICAL MEDICINE & REHABILITATION

## 2020-12-28 PROCEDURE — 99214 OFFICE O/P EST MOD 30 MIN: CPT | Performed by: PHYSICAL MEDICINE & REHABILITATION

## 2020-12-28 PROCEDURE — 3017F COLORECTAL CA SCREEN DOC REV: CPT | Performed by: PHYSICAL MEDICINE & REHABILITATION

## 2020-12-28 PROCEDURE — G8427 DOCREV CUR MEDS BY ELIG CLIN: HCPCS | Performed by: PHYSICAL MEDICINE & REHABILITATION

## 2020-12-28 PROCEDURE — G8484 FLU IMMUNIZE NO ADMIN: HCPCS | Performed by: PHYSICAL MEDICINE & REHABILITATION

## 2020-12-28 NOTE — LETTER
[facility]    Please Schedule the following with: Dr. Livier Denney    Date:  12/28/20     Patient: Justina Mccoy     YOB: 1955    Patient Home Phone: [phone] (home)    Diagnosis: lumbar stenosis M48.062    [x]LT     []RT     []RADHA     []Midline    Levels: Left L5-S1 interlaminar epidural, #1 new series    []Cervical AMPARO 99931, 62260  []L-MBB 52972, 71854  []SI Joint 69374   []C-FACET 82626, 20415, 14504  []L-FACET 93733, 40891  [x]Interlaminar AMPARO 63933     []HIP 30282    []C-MBB  []Transforaminal AMPARO 32355  []Neurotomy 49477, 80967, 32389    Attending Physician: Estephania Rangel    Injection Schedule for: At: [x][facility]   [] [place] [address]    First Insurance: MEDICARE                                    Pre-cert #: Vidya Zendejas  Second Insurance:                 Pre-cert #:    Comments:    2/26/19 Midline L5/S1 interlaminar epidural injection #1  7/30/19 Midline L5/S1 interlaminar epidural injection #2  10/22/19 Midline L5/S1 interlaminar epidural injection #3    5/18/2020 Right L5/S1 interlaminar epidural injection  8/24/2020 Bilateral L3, L4, and L5 medial branch blocks #1 of 2  9/1/2020 Bilateral L3, L4, and L5 medial branch blocks #2  9/21/2020 Bilateral L3, L4, L5 radiofrequency neurotomy    SEDATION:       [] IV           [] ORAL    [] Blood Thinner:                 []Diabetic           []Antibiotic:               []Glaucoma:    [] Pacemaker/defib       [] Current Open Wounds, Lacerations or Sores     Allergies:    Allergies   Allergen Reactions    Cephalexin Hives       Past Medical History:   Diagnosis Date    Actinic keratoses     Allergic rhinitis     Arthritis     Environmental allergies     Prostate cancer ([facility] [address]     PROSTATE        Current Outpatient Medications   Medication Sig Dispense Refill    predniSONE (DELTASONE) 10 MG tablet 3 po bid x 2 days, then 2 po bid x 2 days, then 1 po bid x 2 days, then 1 po qd x 2 days 26 tablet 0    diclofenac (VOLTAREN) 75 MG EC tablet Take 1 tablet by mouth 2 times daily 180 tablet 3    diclofenac (VOLTAREN) 75 MG EC tablet Take 1 tablet by mouth 2 times daily 60 tablet 3    loratadine-pseudoephedrine (ALLERGY RELIEF D-24)  MG per extended release tablet TAKE ONE TABLET BY MOUTH DAILY 90 tablet 3    Ascorbic Acid (VITAMIN C) 1000 MG tablet Take 1,000 mg by mouth daily      Denosumab (PROLIA SC) Inject into the skin every 6 months      donepezil (ARICEPT) 10 MG tablet Take 1 tablet by mouth nightly 90 tablet 3    Red Yeast Rice 600 MG TABS Take 600 mg by mouth 2 times daily      Turmeric 500 MG TABS Take 500 mg by mouth 2 times daily      Cinnamon 500 MG CAPS Take 500 mg by mouth 2 times daily      Omega-3 Fatty Acids (FISH OIL) 1000 MG CAPS Take 1,000 mg by mouth 2 times daily       POTASSIUM CHLORIDE PO Take by mouth daily 99 mg takes 1/2 tab unless having cramps then takes whole tab.  fluticasone (FLONASE) 50 MCG/ACT nasal spray 2 sprays bilaterally qd 3 Bottle 3    vitamin D 1000 units CAPS Take by mouth      Sennosides (SENNA LAXATIVE PO) Take by mouth      MAGNESIUM PO Take by mouth 2 times daily       Calcium Carb-Cholecalciferol 600-500 MG-UNIT CAPS Take 1,000 Units by mouth daily      leuprolide (LUPRON) 11.25 MG injection Inject 11.25 mg into the muscle every 6 months       acetaminophen (TYLENOL) 500 MG tablet Take 1 tablet by mouth every 6 hours as needed for Pain 120 tablet      No current facility-administered medications for this visit. [address]                     ______________________________________________________________________      [address] [place]      1. Admit to preop. 2. Start IV 1000 ml LR at [facility] or _____ml/hr for planned conscious sedation     3. May inject 1 % Lidocaine 0.1 ml Intradermal to numb IV site     4.  Protime/INR if patient is

## 2020-12-28 NOTE — PROGRESS NOTES
Follow up: SPINE    CHIEF COMPLAINT:    Chief Complaint   Patient presents with    Back Pain     fu back had MBB in Oct now having more pain       HISTORY OF PRESENT ILLNESS:                The patient is a 77 y.o. male well-known to me with lumbar spondylo-scoliosis and neurogenic claudication. He is done well with a radiofrequency neurotomy. He had left sciatica from his buttock thigh calf into the foot. He is done well with previous epidural injections. His back pain is still better from the neurotomy. He took a steroid taper with relief. He has no weakness now no pain. He is finishing the steroid is now is afraid he will come back    Pain Assessment  Location of Pain: Back  Severity of Pain: 0      Current/Past Treatment:   · Physical Therapy: HEP  · Chiropractic: YES  · Injection:    7/30/19 Midline L5/S1 interlaminar epidural injection #2--95%  2/26/19 Midline L5/S1 interlaminar epidural injection #1--90% IMPROVED   10/22/19 Midline L5/S1 interlaminar epidural injection #3--95% IMPROVED      5/18/2020 Right L5/S1 interlaminar epidural injection--100%  Medications:            NSAIDS: Mobic            Muscle relaxer:              Steriods:              Neuropathic medications:              Opioids:            Other:   · Surgery/Consult: no     Work Status/Functionality: kelly and farmer  Past Medical History: Medical history form was reviewed and scanned into the Media tab  Past Medical History:   Diagnosis Date    Actinic keratoses     Allergic rhinitis     Arthritis     Environmental allergies     Prostate cancer ([facility] [address]     PROSTATE        REVIEW OF SYSTEMS:   CONSTITUTIONAL: Denies unexplained weight loss, fevers, chills or fatigue  NEUROLOGIC: Denies tremors or seizures         PHYSICAL EXAM:    Vitals: Height 5' 6\" (1.676 m), weight 185 lb (83.9 kg). GENERAL EXAM:  · General Apparence: Patient is adequately groomed with no evidence of malnutrition. · Orientation: The patient is oriented to time, place and person. · Mood & Affect:The patient's mood and affect are appropriate   · Vascular: Examination reveals no swelling tenderness in upper or lower extremities. · Lymphatic: The lymphatic examination bilaterally reveals all areas to be without enlargement or induration  · Sensation: Sensation is intact without deficit  · Coordination/Balance: Good coordination   ·   LUMBAR/SACRAL EXAMINATION:  · Inspection: Local inspection shows no step-off or bruising. Lumbar alignment is normal.  Sagittal and Coronal balance is neutral.      · Palpation:   No evidence of tenderness at the midline. No tenderness bilaterally at the paraspinal or trochanters. There is no step-off or paraspinal spasm. · Range of Motion:   Mod loss flex/ext  · Strength:   Strength testing is 5/5 in all muscle groups tested. · Special Tests:   Straight leg raise and crossed SLR negative. Leg length and pelvis level.  0 out of 5 Allison's signs. · Skin: There are no rashes, ulcerations or lesions. · Reflexes: Reflexes are symmetrically 2+ at the patellar and ankle tendons. Clonus absent bilaterally at the feet. · Gait & station: normal gait  · Additional Examinations:   ·   · RIGHT LOWER EXTREMITY: Inspection/examination of the right lower extremity does not show any tenderness, deformity or injury. Range of motion is full. There is no gross instability. There are no rashes, ulcerations or lesions. Strength and tone are normal.  · LEFT LOWER EXTREMITY:  Inspection/examination of the left lower extremity does not show any tenderness, deformity or injury. Range of motion is full. There is no gross instability. There are no rashes, ulcerations or lesions.   Strength and tone are normal. 2 views lumbar spine 3/12/2020 show scoliosis with multilevel severe DDD/spondylosis and likely degenerative endplate changes L4-G6.  Lateral view is skewed due to scoliotic curve.  No significant changes compared to prior     2-6-19 MRI independently reviewed showing multilevel scoliosis, ddd, spondylosis, varying mostly mild lumbar central narrowing-moderate L3-4.  Leg symptoms clinically from lumbar spine.      2 views lumbar spine 1/31/2019 shows scoliosis with multilevel at least moderate DDD/spondylosis and facet arthropathy.  Lateral view is skewed due to scoliotic curve.     Arterial dopplers 1-23-19  Conclusions        Summary        1. Normal bilateral lower extremity arterial segmental evaluation. No    evidence for resting arterial insufficiency.    2. The patient's vessels suggest possible non-compliance with    non-compressible areas noted.               Impression:  1) Chronic recurrent LBP, left leg pain, neurogenic claudication--improved w/ESIs  2) Scoliosis, L3 4 central stenosis  3) H/o prostate cancer  4) Negative vascular eval           Plan:    Left L5-S1 interlaminar epidural, #1.   Cancel if improved      VIVIANA Goodman

## 2021-01-04 ENCOUNTER — HOSPITAL ENCOUNTER (OUTPATIENT)
Age: 66
Setting detail: OUTPATIENT SURGERY
Discharge: HOME | End: 2021-01-04
Attending: PHYSICAL MEDICINE & REHABILITATION | Admitting: PHYSICAL MEDICINE & REHABILITATION
Payer: MEDICARE

## 2021-01-04 VITALS
WEIGHT: 190 LBS | BODY MASS INDEX: 30.53 KG/M2 | DIASTOLIC BLOOD PRESSURE: 92 MMHG | RESPIRATION RATE: 12 BRPM | TEMPERATURE: 96.5 F | HEIGHT: 66 IN | OXYGEN SATURATION: 97 % | HEART RATE: 76 BPM | SYSTOLIC BLOOD PRESSURE: 149 MMHG

## 2021-01-04 PROCEDURE — 2709999900 HC NON-CHARGEABLE SUPPLY: Performed by: PHYSICAL MEDICINE & REHABILITATION

## 2021-01-04 PROCEDURE — 2500000003 HC RX 250 WO HCPCS: Performed by: PHYSICAL MEDICINE & REHABILITATION

## 2021-01-04 PROCEDURE — 3600000012 HC SURGERY LEVEL 2 ADDTL 15MIN: Performed by: PHYSICAL MEDICINE & REHABILITATION

## 2021-01-04 PROCEDURE — 6360000002 HC RX W HCPCS: Performed by: PHYSICAL MEDICINE & REHABILITATION

## 2021-01-04 PROCEDURE — 7100000010 HC PHASE II RECOVERY - FIRST 15 MIN: Performed by: PHYSICAL MEDICINE & REHABILITATION

## 2021-01-04 PROCEDURE — 3600000002 HC SURGERY LEVEL 2 BASE: Performed by: PHYSICAL MEDICINE & REHABILITATION

## 2021-01-04 PROCEDURE — 6360000004 HC RX CONTRAST MEDICATION: Performed by: PHYSICAL MEDICINE & REHABILITATION

## 2021-01-04 RX ORDER — LIDOCAINE HYDROCHLORIDE 10 MG/ML
INJECTION, SOLUTION EPIDURAL; INFILTRATION; INTRACAUDAL; PERINEURAL PRN
Status: DISCONTINUED | OUTPATIENT
Start: 2021-01-04 | End: 2021-01-04 | Stop reason: ALTCHOICE

## 2021-01-04 RX ORDER — METHYLPREDNISOLONE ACETATE 40 MG/ML
INJECTION, SUSPENSION INTRA-ARTICULAR; INTRALESIONAL; INTRAMUSCULAR; SOFT TISSUE
Status: DISCONTINUED
Start: 2021-01-04 | End: 2021-01-04 | Stop reason: HOSPADM

## 2021-01-04 ASSESSMENT — PAIN - FUNCTIONAL ASSESSMENT: PAIN_FUNCTIONAL_ASSESSMENT: PREVENTS OR INTERFERES SOME ACTIVE ACTIVITIES AND ADLS

## 2021-01-05 ENCOUNTER — TELEPHONE (OUTPATIENT)
Dept: ORTHOPEDIC SURGERY | Age: 66
End: 2021-01-05

## 2021-01-05 NOTE — TELEPHONE ENCOUNTER
Auth: # NPR    Date: 01/11/2021  Type of SX:  OP  Location: [facility]  CPT: 23319   DX Code: M48.062  SX area: Left L5 - S1 Interlaminar AMPARO  Insurance: Medicare

## 2021-01-05 NOTE — H&P
HISTORY AND PHYSICAL/PRE-SEDATION ASSESSMENT    Patient:  Dennise Cowden   :  1955  Medical Record No.:  [id number]   Date:  2021  Physician:  Wei Herring M.D. Facility: [facility]     Nursing History and Physical reviewed and agreed upon. Additional findings:    Allergies:  Cephalexin    Home Medications:    Prior to Admission medications    Medication Sig Start Date End Date Taking? Authorizing Provider   Ascorbic Acid (VITAMIN C) 1000 MG tablet Take 1,000 mg by mouth daily   Yes Historical Provider, MD   Denosumab (PROLIA SC) Inject into the skin every 6 months   Yes Historical Provider, MD   donepezil (ARICEPT) 10 MG tablet Take 1 tablet by mouth nightly 20  Yes Lola Sommers MD   Red Yeast Rice 600 MG TABS Take 600 mg by mouth 2 times daily   Yes Historical Provider, MD   Turmeric 500 MG TABS Take 500 mg by mouth 2 times daily   Yes Historical Provider, MD   Cinnamon 500 MG CAPS Take 500 mg by mouth 2 times daily   Yes Historical Provider, MD   Omega-3 Fatty Acids (FISH OIL) 1000 MG CAPS Take 1,000 mg by mouth 2 times daily    Yes Historical Provider, MD   POTASSIUM CHLORIDE PO Take by mouth daily 99 mg takes 1/2 tab unless having cramps then takes whole tab.    Yes Historical Provider, MD   fluticasone [facility]) 50 MCG/ACT nasal spray 2 sprays bilaterally qd 18  Yes Lola Sommers MD   vitamin D 1000 units CAPS Take by mouth   Yes Historical Provider, MD   MAGNESIUM PO Take by mouth 2 times daily    Yes Historical Provider, MD   Calcium Carb-Cholecalciferol 600-500 MG-UNIT CAPS Take 1,000 Units by mouth daily 16  Yes Tiff Olson MD   leuprolide (LUPRON) 11.25 MG injection Inject 11.25 mg into the muscle every 6 months    Yes Historical Provider, MD   acetaminophen (TYLENOL) 500 MG tablet Take 1 tablet by mouth every 6 hours as needed for Pain 7/9/15  Yes Tiff Olson MD diclofenac (VOLTAREN) 75 MG EC tablet Take 1 tablet by mouth 2 times daily 12/8/20 12/3/21  Suzanne Wills DO   diclofenac (VOLTAREN) 75 MG EC tablet Take 1 tablet by mouth 2 times daily 9/17/20   Suzanne Wills DO   loratadine-pseudoephedrine (ALLERGY RELIEF D-24)  MG per extended release tablet TAKE ONE TABLET BY MOUTH DAILY 7/21/20   Al Quinones MD   Sennosides (SENNA LAXATIVE PO) Take by mouth    Historical Provider, MD       Vitals: Stable     PHYSICAL EXAM:  HENT: Airway patent and reviewed  Cardiovascular: Normal rate, regular rhythm, normal heart sounds. Pulmonary/Chest: No wheezes. No rhonchi. No rales. Abdominal: Soft. Bowel sounds are normal. No distension. Mallampati: 2      MALLAMPATI:           []   I. Complete visualization of the soft palate           [x]   II. Complete visualization of the uvula            []   III. Visualization of only the base of the uvula           []   IV. Soft palate is not visible     ASA CLASS:         []   I. Normal, healthy adult           [x]   II.  Mild systemic disease            []   III. Severe systemic disease      Sedation plan:   [x]  Local              []  Minimal                  []  General anesthesia    Patient's condition acceptable for planned procedure/sedation. Post Procedure Plan   Return to same level of care   ______________________     The risks and benefits as well as alternatives to the procedure have been discussed with the patient and or family. The patient and or next of kin understands and agrees to proceed.     Leopoldo Rose M.D.

## 2021-01-06 ENCOUNTER — OFFICE VISIT (OUTPATIENT)
Dept: ORTHOPEDIC SURGERY | Age: 66
End: 2021-01-06
Payer: MEDICARE

## 2021-01-06 VITALS — BODY MASS INDEX: 30.53 KG/M2 | HEIGHT: 66 IN | WEIGHT: 190 LBS

## 2021-01-06 DIAGNOSIS — M19.079 ARTHRITIS OF FOOT: ICD-10-CM

## 2021-01-06 DIAGNOSIS — M67.372 TRANSIENT SYNOVITIS, LEFT ANKLE AND FOOT: Primary | ICD-10-CM

## 2021-01-06 PROCEDURE — 1123F ACP DISCUSS/DSCN MKR DOCD: CPT | Performed by: PODIATRIST

## 2021-01-06 PROCEDURE — 1036F TOBACCO NON-USER: CPT | Performed by: PODIATRIST

## 2021-01-06 PROCEDURE — G8484 FLU IMMUNIZE NO ADMIN: HCPCS | Performed by: PODIATRIST

## 2021-01-06 PROCEDURE — 99212 OFFICE O/P EST SF 10 MIN: CPT | Performed by: PODIATRIST

## 2021-01-06 PROCEDURE — 4040F PNEUMOC VAC/ADMIN/RCVD: CPT | Performed by: PODIATRIST

## 2021-01-06 PROCEDURE — G8417 CALC BMI ABV UP PARAM F/U: HCPCS | Performed by: PODIATRIST

## 2021-01-06 PROCEDURE — 3017F COLORECTAL CA SCREEN DOC REV: CPT | Performed by: PODIATRIST

## 2021-01-06 PROCEDURE — G8427 DOCREV CUR MEDS BY ELIG CLIN: HCPCS | Performed by: PODIATRIST

## 2021-01-06 NOTE — PROGRESS NOTES
HISTORY OF PRESENT ILLNESS: This is a followup for midfoot synovitis and tenosynovitis of the left foot. He states that all of his left midfoot pain has resolved after taking the prednisone. He also received an epidural injection last week from Dr. Serafin Garcia for his chronic sciatica issues. PHYSICAL EXAM: He does not demonstrate any palpable tenderness in the left midfoot. There is no edema present. There is no erythema or ecchymosis present. The patient has palpable pedal pulses bilateral.  The sensation is grossly intact bilateral.      ASSESSMENT:  Midfoot synovitis and osteoarthritis, left foot. PLAN: I advised him to use over-the-counter arch support on a regular basis. We discussed the chronic nature of midfoot osteoarthritis. He can gradually increase activity as tolerated. I will see him back as needed.

## 2021-01-19 ENCOUNTER — VIRTUAL VISIT (OUTPATIENT)
Dept: ORTHOPEDIC SURGERY | Age: 66
End: 2021-01-19
Payer: MEDICARE

## 2021-01-19 DIAGNOSIS — M48.062 LUMBAR STENOSIS WITH NEUROGENIC CLAUDICATION: ICD-10-CM

## 2021-01-19 DIAGNOSIS — M54.16 LUMBAR RADICULITIS: Primary | ICD-10-CM

## 2021-01-19 PROCEDURE — 99212 OFFICE O/P EST SF 10 MIN: CPT | Performed by: PHYSICIAN ASSISTANT

## 2021-01-19 PROCEDURE — 3017F COLORECTAL CA SCREEN DOC REV: CPT | Performed by: PHYSICIAN ASSISTANT

## 2021-01-19 PROCEDURE — G8417 CALC BMI ABV UP PARAM F/U: HCPCS | Performed by: PHYSICIAN ASSISTANT

## 2021-01-19 PROCEDURE — 1123F ACP DISCUSS/DSCN MKR DOCD: CPT | Performed by: PHYSICIAN ASSISTANT

## 2021-01-19 PROCEDURE — 1036F TOBACCO NON-USER: CPT | Performed by: PHYSICIAN ASSISTANT

## 2021-01-19 PROCEDURE — 4040F PNEUMOC VAC/ADMIN/RCVD: CPT | Performed by: PHYSICIAN ASSISTANT

## 2021-01-19 PROCEDURE — G8427 DOCREV CUR MEDS BY ELIG CLIN: HCPCS | Performed by: PHYSICIAN ASSISTANT

## 2021-01-19 PROCEDURE — G8484 FLU IMMUNIZE NO ADMIN: HCPCS | Performed by: PHYSICIAN ASSISTANT

## 2021-01-19 NOTE — PROGRESS NOTES
Virtual Visit: PM&R SPINE    CHIEF COMPLAINT:    Chief Complaint   Patient presents with    Back Pain       The patient is being evaluated by a Virtual Visit (video visit) encounter due to the restrictions of the COVID-19 pandemic. A caregiver was present when appropriate. All issues as below were discussed and addressed, but no physical exam was performed unless allowed by visual confirmation. If it was felt that patient should be evaluated in clinic then they were directed there. Due to this being a TeleHealth encounter (During [facility]- public health emergency), evaluation of the following organ systems was limited to: spine. Pursuant to the emergency declaration under the [address] [place] [address], [facility] waiver authority and the [employer] Act, this Virtual Visit was conducted with patient's verbal consent, to reduce the risk of exposure to COVID-19 and provide necessary medical care. The patient (and/or legal guardian) has also been advised to contact this office for worsening conditions or problems, and seek emergency medical treatment and/or call 911 if deemed necessary. Services were provided through a video synchronous discussion virtually to substitute for in-person encounter. Individuals present during telemedicine consultation-[facility]    HISTORY OF PRESENT ILLNESS:                The patient is a 77 y.o. male history of prostate cancer well-known to us here for virtual visit follow-up after left L5-S1 LESI #1 from 1/4/2021. Initially reported chronic worsening aching low back pain radiating into the left buttock posterior thigh/calf to the foot. Symptoms were increased with activity improved with resting. He currently reports 100% improvement since injection with improved functionality. He denies any progressive numbness tingling or weakness. No side effects of the procedure.    Current/Past Treatment: · Physical Therapy: HEP  · Chiropractic: YES  · Injection:    7/30/19 Midline L5/S1 interlaminar epidural injection #2--95%  2/26/19 Midline L5/S1 interlaminar epidural injection #1--90% IMPROVED   10/22/19 Midline L5/S1 interlaminar epidural injection #3--95% IMPROVED      5/18/2020 Right L5/S1 interlaminar epidural injection--100%    8/24/2020 Bilateral L3, L4, and L5 medial branch blocks #1 of 2  9/1/2020 Bilateral L3, L4, and L5 medial branch blocks #2  9/21/2020 Bilateral L3, L4, L5 radiofrequency neurotomy    1/4/21 Left L5/S1 interlaminar epidural injection--100%  Medications:            NSAIDS: Mobic            Muscle relaxer:              Steriods:  Yes              Neuropathic medications:              Opioids:            Other:   · Surgery/Consult: no     Work Status/Functionality: kelly and farmer    Past Medical History: Medical history form was reviewed today & scanned into the media tab  Past Medical History:   Diagnosis Date    Actinic keratoses     Allergic rhinitis     Arthritis     Environmental allergies     Prostate cancer ([facility] [address]     PROSTATE      Past Surgical History:     Past Surgical History:   Procedure Laterality Date    COLONOSCOPY  2-16-16    EPIDURAL STEROID INJECTION N/A 2/26/2019    MIDLINE LUMBAR FIVE SACRAL ONE EPIDURAL STEROID INJECTION SITE CONFIRMED BY FLUOROSCOPY performed by Jennyfer Rodriguez MD at [facility] N/A 7/30/2019    MIDLINE LUMBAR FIVE SACRAL ONE EPIDURAL STEROID INJECTION SITE CONFIRMED BY FLUOROSCOPY performed by Jennyfer Rodriguez MD at [facility] N/A 10/22/2019    MIDLINE LUMBAR FIVE SACRAL ONE EPIDURAL STEROID INJECTION SITE CONFIRMED BY FLUOROSCOPY performed by Jennyfer Rodriguez MD at [facility] [address] ARTHROSCOPY Left 4/26/16    partial medial and lateral meniscectomy, chondroplasty    KNEE SURGERY Right 10/15/13 RT KNEE PARTIAL MEDIAL, LATERAL MENISECTOMY AND CHONDROPLASTY WITH REMOVAL OF LOSSE HARDWARE    KNEE SURGERY Right 10/15/13    RT KNEE ARTHROSCOPY PARTIAL LATERAL AND MEDIAL MENISECTOMY WITH CHONDROPLASTY AND REMOVAL OF LOOSE BODIES    NERVE SURGERY Bilateral 9/21/2020    BILATERAL LUMBAR THREE LUMBAR FOUR LUMBAR FIVE RADIOFREQUENCY ABLATION SITE CONFIRMED BY FLUOROSCOPY performed by Erik Cleary MD at [facility] N/A 5/18/2020    MIDLINE LUMBAR FIVE SACRAL ONE EPIDURAL STEROID INJECTION SITE CONFIRMED BY FLUOROSCOPY performed by Erik Cleary MD at [address] [place] [address] Bilateral 8/24/2020    BILATERAL LUMBAR THREE, FOUR, FIVE MEDIAL BRANCH BLOCK SITE CONFIRMED BY FLUOROSCOPY performed by Erik Cleary MD at [address] [place] [address] Bilateral 9/1/2020    BILATERAL LUMBAR THREE LUMBAR FOUR LUMBAR FIVE LUMBAR MEDIAL BRANCH BLOCK SITE CONFIRMED BY FLUOROSCOPY performed by Erik Cleary MD at [address] [place] [address] Left 1/4/2021    LEFT LUMBAR FIVE SACRAL ONE EPIDURAL STEROID INJECTION SITE CONFIRMED BY FLUOROSCOPY performed by Erik Cleary MD at [facility]  07/17/15    DaVinci prostatectomy    SHOULDER SURGERY       Current Medications:     Current Outpatient Medications:     diclofenac (VOLTAREN) 75 MG EC tablet, Take 1 tablet by mouth 2 times daily, Disp: 180 tablet, Rfl: 3    diclofenac (VOLTAREN) 75 MG EC tablet, Take 1 tablet by mouth 2 times daily, Disp: 60 tablet, Rfl: 3    loratadine-pseudoephedrine (ALLERGY RELIEF D-24)  MG per extended release tablet, TAKE ONE TABLET BY MOUTH DAILY, Disp: 90 tablet, Rfl: 3    Ascorbic Acid (VITAMIN C) 1000 MG tablet, Take 1,000 mg by mouth daily, Disp: , Rfl:     Denosumab (PROLIA SC), Inject into the skin every 6 months, Disp: , Rfl:   donepezil (ARICEPT) 10 MG tablet, Take 1 tablet by mouth nightly, Disp: 90 tablet, Rfl: 3    Red Yeast Rice 600 MG TABS, Take 600 mg by mouth 2 times daily, Disp: , Rfl:     Turmeric 500 MG TABS, Take 500 mg by mouth 2 times daily, Disp: , Rfl:     Cinnamon 500 MG CAPS, Take 500 mg by mouth 2 times daily, Disp: , Rfl:     Omega-3 Fatty Acids (FISH OIL) 1000 MG CAPS, Take 1,000 mg by mouth 2 times daily , Disp: , Rfl:     POTASSIUM CHLORIDE PO, Take by mouth daily 99 mg takes 1/2 tab unless having cramps then takes whole tab., Disp: , Rfl:     fluticasone (FLONASE) 50 MCG/ACT nasal spray, 2 sprays bilaterally qd, Disp: 3 Bottle, Rfl: 3    vitamin D 1000 units CAPS, Take by mouth, Disp: , Rfl:     Sennosides (SENNA LAXATIVE PO), Take by mouth, Disp: , Rfl:     MAGNESIUM PO, Take by mouth 2 times daily , Disp: , Rfl:     Calcium Carb-Cholecalciferol 600-500 MG-UNIT CAPS, Take 1,000 Units by mouth daily, Disp: , Rfl:     leuprolide (LUPRON) 11.25 MG injection, Inject 11.25 mg into the muscle every 6 months , Disp: , Rfl:     acetaminophen (TYLENOL) 500 MG tablet, Take 1 tablet by mouth every 6 hours as needed for Pain, Disp: 120 tablet, Rfl:   Allergies:  Cephalexin  Social History:    reports that he has never smoked. He has never used smokeless tobacco. He reports current alcohol use of about 1.0 standard drinks of alcohol per week. He reports that he does not use drugs.   Family History:   Family History   Problem Relation Age of Onset    Diabetes Mother     Heart Disease Mother 79        2 vessel bypass    High Blood Pressure Mother     Alzheimer's Disease Mother     Arthritis Father     Heart Disease Father 68        stents    High Blood Pressure Father     Stroke Father     Heart Disease Paternal Uncle     Alzheimer's Disease Maternal Grandmother     Cancer Paternal Grandfather     Arthritis Paternal Grandfather        REVIEW OF SYSTEMS: Patient's review of symptoms was reviewed and is significant for back pain and negative for recent unexplained weight loss, fatigue, current fever/chills, bowel or bladder dysfunction, chest pain, or shortness of breath. All other pertinent ROS were negative. PHYSICAL EXAM--limited to visual exam only  Vitals: Height 5' 6\" (1.676 m), weight 185 lb (83.9 kg).       GENERAL EXAM:  · General Apparence: Patient is adequately groomed with no evidence of malnutrition. · Orientation: The patient is oriented to time, place and person. · Mood & Affect:The patient's mood and affect are appropriate   · Coordination/Balance: Good coordination   LUMBAR/SACRAL EXAMINATION:  · Inspection: Lumbar alignment is normal.  Sagittal and Coronal balance is neutral.       · Range of Motion: Intact flexion mild loss of extension without pain today  · Gait & station: Normal unassisted  · Strength: In muscle groups visualized is at least anti gravity. Can heel/toe walk appropriately   · Sensation: intact to light touch of extremities per patient   · Additional Examinations:   · RIGHT LOWER EXTREMITY: Inspection/examination of the right lower extremity does not show any deformity or injury. Range of motion seen is full. There is no gross instability. · LEFT LOWER EXTREMITY:  Inspection/examination of the left lower extremity does not show any deformity or injury. Range of motion seen is full. There is no gross instability.      Diagnostic Testin views lumbar spine 3/12/2020 show scoliosis with multilevel severe DDD/spondylosis and likely degenerative endplate changes J1-B7.  Lateral view is skewed due to scoliotic curve.  No significant changes compared to prior     2--19 MRI independently reviewed showing multilevel scoliosis, ddd, spondylosis, varying mostly mild lumbar central narrowing-moderate L3-4.  Leg symptoms clinically from lumbar spine.     2 views lumbar spine 1/31/2019 shows scoliosis with multilevel at least moderate DDD/spondylosis and facet arthropathy.  Lateral view is skewed due to scoliotic curve.     Arterial dopplers 1-23-19  Conclusions        Summary        1. Normal bilateral lower extremity arterial segmental evaluation.  No    evidence for resting arterial insufficiency.    2. The patient's vessels suggest possible non-compliance with    non-compressible areas noted.               Impression:  1) Chronic recurrent LBP, left leg pain, neurogenic claudication--improved   2) Scoliosis, L3 4 central stenosis  3) H/o prostate cancer  4) Negative vascular eval      Plan:   1) Cont HEP  2) Discussed repeat AMPARO if pain returns or worsens          Time spent during this visit - [facility]

## 2021-01-21 NOTE — PATIENT INSTRUCTIONS
Protecting Yourself From the Sun    · Apply an over-the-counter broad spectrum water resistant sunscreen with an SPF of at least 30 to exposed areas of the skin. Dont forget the ears and lips! Remember to reapply sunscreen about every 2 hours and after swimming or sweating. · Wear sun protective clothing. Swim shirts (aka. rash guards) are a great idea and negates the need to reapply sunscreen in those areas. · Seek the shade whenever possible especially between the hours of 10 am and 4 pm when the suns rays are the strongest.     · Avoid tanning beds       Cryosurgery (Freezing) Wound Care Instructions    AFTER THE PROCEDURE:   ? You will notice swelling and redness around the site. This is normal.   ? You may experience a sharp or sore feeling for the next several days. For this discomfort, you may take acetaminophen (Tylenol©). ? A blister may develop at the treated area, sometimes as soon as by the end of the day. After several days, the blister will subside and a scab will form. ? If the area is bumped or traumatized during the first few days following freezing, you may develop bleeding into the blister, forming a blood blister. This is nothing to be alarmed about. ? If the blister is tense, uncomfortable, or much larger than the site that was frozen, you may pop the blister along its edge with a sterile needle (boiled, heated under a flame, or cleaned with alcohol) to allow the fluid to drain out. If the blister does not bother you, no treatment is needed. ? Do NOT peel off the top of the blister roof. It will act as a dressing on top of your wound. WOUND CARE:   ? You may shower or bathe as usual, but avoid scrubbing the areas that have been frozen. ? Cleanse the site twice a day with mild soapy water, and then apply a thin film of white petrolatum (Vaseline©). ? You do not need to cover the area, but can if you prefer. ? Do NOT allow the site to become dry or crusted, or attempt to dry it out with rubbing alcohol or hydrogen peroxide. ? Continue this regimen until the area is pink and healed. Depending on the size and location of your cryosurgery site, healing may take 2 to 4 weeks. ? The area may continue to be pink for several weeks, and over the next few months may become darker or lighter than the surrounding skin. This may be a permanent change. Biopsy Wound Care Instructions    · Keep the bandage in place for 24 hours. · Cleanse the wound with mild soapy water daily  ? Gently dry the area. ? Apply Vaseline or petroleum jelly to the wound using a cotton tipped applicator. ? Cover with a clean bandage. ? Repeat this process until the biopsy site is healed. ? If you had stitches placed, continue treating the site until the stitches are removed. Remember to make an appointment to return to have your stitches removed by our staff. ? You may shower and bathe as usual.       ** Biopsy results generally take around 7 business days to come back. If you have not heard from us by then, please call the office at [phone] between 8AM and 4PM Monday through Friday.

## 2021-01-21 NOTE — PROGRESS NOTES
 NERVE SURGERY Bilateral 9/21/2020    BILATERAL LUMBAR THREE LUMBAR FOUR LUMBAR FIVE RADIOFREQUENCY ABLATION SITE CONFIRMED BY FLUOROSCOPY performed by Adina Sellers MD at [address] [facility] N/A 5/18/2020    MIDLINE LUMBAR FIVE SACRAL ONE EPIDURAL STEROID INJECTION SITE CONFIRMED BY FLUOROSCOPY performed by Adina Sellers MD at [address] [facility] Bilateral 8/24/2020    BILATERAL LUMBAR THREE, FOUR, FIVE MEDIAL BRANCH BLOCK SITE CONFIRMED BY FLUOROSCOPY performed by Adina Sellers MD at [address] [facility] Bilateral 9/1/2020    BILATERAL LUMBAR THREE LUMBAR FOUR LUMBAR FIVE LUMBAR MEDIAL BRANCH BLOCK SITE CONFIRMED BY FLUOROSCOPY performed by Adina Sellers MD at [address] [facility] Left 1/4/2021    LEFT LUMBAR FIVE SACRAL ONE EPIDURAL STEROID INJECTION SITE CONFIRMED BY FLUOROSCOPY performed by Adina Sellers MD at [facility]  07/17/15    DaVinci prostatectomy    SHOULDER SURGERY         Allergies   Allergen Reactions    Cephalexin Hives     Outpatient Medications Marked as Taking for the 1/25/21 encounter (Office Visit) with Suman Morton MD   Medication Sig Dispense Refill    diclofenac (VOLTAREN) 75 MG EC tablet Take 1 tablet by mouth 2 times daily 180 tablet 3    diclofenac (VOLTAREN) 75 MG EC tablet Take 1 tablet by mouth 2 times daily 60 tablet 3    loratadine-pseudoephedrine (ALLERGY RELIEF D-24)  MG per extended release tablet TAKE ONE TABLET BY MOUTH DAILY 90 tablet 3    Ascorbic Acid (VITAMIN C) 1000 MG tablet Take 1,000 mg by mouth daily      Denosumab (PROLIA SC) Inject into the skin every 6 months      donepezil (ARICEPT) 10 MG tablet Take 1 tablet by mouth nightly 90 tablet 3    Red Yeast Rice 600 MG TABS Take 600 mg by mouth 2 times daily      Turmeric 500 MG TABS Take 500 mg by mouth 2 times daily  Cinnamon 500 MG CAPS Take 500 mg by mouth 2 times daily      Omega-3 Fatty Acids (FISH OIL) 1000 MG CAPS Take 1,000 mg by mouth 2 times daily       POTASSIUM CHLORIDE PO Take by mouth daily 99 mg takes 1/2 tab unless having cramps then takes whole tab.  fluticasone (FLONASE) 50 MCG/ACT nasal spray 2 sprays bilaterally qd 3 Bottle 3    vitamin D 1000 units CAPS Take by mouth      Sennosides (SENNA LAXATIVE PO) Take by mouth      MAGNESIUM PO Take by mouth 2 times daily       Calcium Carb-Cholecalciferol 600-500 MG-UNIT CAPS Take 1,000 Units by mouth daily      leuprolide (LUPRON) 11.25 MG injection Inject 11.25 mg into the muscle every 6 months       acetaminophen (TYLENOL) 500 MG tablet Take 1 tablet by mouth every 6 hours as needed for Pain 120 tablet      Social History: Julio Baezric. Wife is RN at [employer]. [employer] - changing to [place] next month. Mother and son-in-law passed this month. Physical Examination     The following were examined and determined to be normal: Psych/Neuro, Scalp/hair, Conjunctivae/eyelids, Gums/teeth/lips, Breast/axilla/chest, Abdomen, Back, RUE, LUE, RLE, LLE, Nails/digits and Genitalia/groin/buttocks. The following were examined and determined to be abnormal: Head/face and Neck. -General: Well-appearing, NAD  1. R temple 2 - ill-defined irregularly-shaped roughly-scaling thin pink macule(s)/papule(s)    2. L temple - 6mm irregularly-shaped brown macule       3. Posterior neck - few excoriated erythematous papules and collarettes of scale    Assessment and Plan     1. Actinic keratosis(es)  -Edu re: relationship with chronic cumulative sun exposure, low premalignant potential.   -2 lesion(s) treated w/ liquid nitrogen x 2 cycles - R temple. Edu re: risk of blister formation, discomfort, scar, hypopigmentation. Discussed wound care w/ vaseline or Aquaphor. -Reviewed sun protective behavior -- sun avoidance during the peak hours of the day, sun-protective clothing (including hat and sunglasses), OTC sunscreen use (water resistant, broad spectrum, SPF at least 30, need for reapplication every 2 to 3 hours). -Return for full skin exam in 1 year (sooner if indicated)      2. Neoplasm of uncertain behavior of skin - R/o lentigo, L temple  -Discussed possible diagnosis. Patient agreeable to biopsy. Verbal consent obtained after risks (infection, bleeding, scar), benefits and alternatives explained. -Area(s) to be biopsied were marked with a surgical pen. Site(s) were cleansed with alcohol. Local anesthesia achieved with 1% lidocaine with epinephrine/sodium bicarbonate. Shave biopsy performed with a razor blade. Hemostasis was achieved with aluminum chloride. The wound(s) were dressed with petrolatum and covered with a bandage. Specimen(s) sent to pathology. Pt educated re: risk of bleeding, infection, scar and wound care instructions.       3. Folliculitis of posterior neck   -[facility] re: potential chronicity  -Clindamycin soln bid prn new lesions

## 2021-01-25 ENCOUNTER — OFFICE VISIT (OUTPATIENT)
Dept: DERMATOLOGY | Age: 66
End: 2021-01-25
Payer: MEDICARE

## 2021-01-25 VITALS — TEMPERATURE: 98.9 F

## 2021-01-25 DIAGNOSIS — Z12.83 SCREENING EXAM FOR SKIN CANCER: ICD-10-CM

## 2021-01-25 DIAGNOSIS — L73.9 FOLLICULITIS: ICD-10-CM

## 2021-01-25 DIAGNOSIS — D48.5 NEOPLASM OF UNCERTAIN BEHAVIOR OF SKIN: Primary | ICD-10-CM

## 2021-01-25 DIAGNOSIS — L57.0 ACTINIC KERATOSES: ICD-10-CM

## 2021-01-25 PROCEDURE — 17000 DESTRUCT PREMALG LESION: CPT | Performed by: DERMATOLOGY

## 2021-01-25 PROCEDURE — 99214 OFFICE O/P EST MOD 30 MIN: CPT | Performed by: DERMATOLOGY

## 2021-01-25 PROCEDURE — 3017F COLORECTAL CA SCREEN DOC REV: CPT | Performed by: DERMATOLOGY

## 2021-01-25 PROCEDURE — 11102 TANGNTL BX SKIN SINGLE LES: CPT | Performed by: DERMATOLOGY

## 2021-01-25 PROCEDURE — 4040F PNEUMOC VAC/ADMIN/RCVD: CPT | Performed by: DERMATOLOGY

## 2021-01-25 PROCEDURE — G8484 FLU IMMUNIZE NO ADMIN: HCPCS | Performed by: DERMATOLOGY

## 2021-01-25 PROCEDURE — 17003 DESTRUCT PREMALG LES 2-14: CPT | Performed by: DERMATOLOGY

## 2021-01-25 PROCEDURE — 1123F ACP DISCUSS/DSCN MKR DOCD: CPT | Performed by: DERMATOLOGY

## 2021-01-25 PROCEDURE — G8417 CALC BMI ABV UP PARAM F/U: HCPCS | Performed by: DERMATOLOGY

## 2021-01-25 PROCEDURE — G8427 DOCREV CUR MEDS BY ELIG CLIN: HCPCS | Performed by: DERMATOLOGY

## 2021-01-25 PROCEDURE — 1036F TOBACCO NON-USER: CPT | Performed by: DERMATOLOGY

## 2021-01-25 RX ORDER — CLINDAMYCIN PHOSPHATE 11.9 MG/ML
SOLUTION TOPICAL
Qty: 60 ML | Refills: 2 | Status: SHIPPED | OUTPATIENT
Start: 2021-01-25

## 2021-01-28 ENCOUNTER — TELEPHONE (OUTPATIENT)
Dept: DERMATOLOGY | Age: 66
End: 2021-01-28

## 2021-01-28 DIAGNOSIS — D03.39 MELANOMA IN SITU OF TEMPLE REGION (HCC): Primary | ICD-10-CM

## 2021-01-28 NOTE — TELEPHONE ENCOUNTER
Kelvin called [facility]  Kelvin graham/matthew [phone]  Kelvin states:   - report will be delayed due to running special stains  Please call to discuss if you have any questions thanks

## 2021-01-29 ENCOUNTER — VIRTUAL VISIT (OUTPATIENT)
Dept: FAMILY MEDICINE CLINIC | Age: 66
End: 2021-01-29
Payer: MEDICARE

## 2021-01-29 VITALS — BODY MASS INDEX: 33.66 KG/M2 | WEIGHT: 190 LBS | HEIGHT: 63 IN | TEMPERATURE: 98 F

## 2021-01-29 DIAGNOSIS — Z00.00 ROUTINE GENERAL MEDICAL EXAMINATION AT A HEALTH CARE FACILITY: Primary | ICD-10-CM

## 2021-01-29 PROCEDURE — 1123F ACP DISCUSS/DSCN MKR DOCD: CPT | Performed by: FAMILY MEDICINE

## 2021-01-29 PROCEDURE — G0438 PPPS, INITIAL VISIT: HCPCS | Performed by: FAMILY MEDICINE

## 2021-01-29 PROCEDURE — 4040F PNEUMOC VAC/ADMIN/RCVD: CPT | Performed by: FAMILY MEDICINE

## 2021-01-29 PROCEDURE — 3017F COLORECTAL CA SCREEN DOC REV: CPT | Performed by: FAMILY MEDICINE

## 2021-01-29 ASSESSMENT — PATIENT HEALTH QUESTIONNAIRE - PHQ9
SUM OF ALL RESPONSES TO PHQ QUESTIONS 1-9: 2
1. LITTLE INTEREST OR PLEASURE IN DOING THINGS: 0
SUM OF ALL RESPONSES TO PHQ QUESTIONS 1-9: 2
SUM OF ALL RESPONSES TO PHQ QUESTIONS 1-9: 2
SUM OF ALL RESPONSES TO PHQ9 QUESTIONS 1 & 2: 2

## 2021-01-29 ASSESSMENT — LIFESTYLE VARIABLES
HOW OFTEN DURING THE LAST YEAR HAVE YOU HAD A FEELING OF GUILT OR REMORSE AFTER DRINKING: 0
HOW OFTEN DURING THE LAST YEAR HAVE YOU FOUND THAT YOU WERE NOT ABLE TO STOP DRINKING ONCE YOU HAD STARTED: 0
HOW MANY STANDARD DRINKS CONTAINING ALCOHOL DO YOU HAVE ON A TYPICAL DAY: 0
HAVE YOU OR SOMEONE ELSE BEEN INJURED AS A RESULT OF YOUR DRINKING: 0
HOW OFTEN DURING THE LAST YEAR HAVE YOU NEEDED AN ALCOHOLIC DRINK FIRST THING IN THE MORNING TO GET YOURSELF GOING AFTER A NIGHT OF HEAVY DRINKING: 0
HOW OFTEN DURING THE LAST YEAR HAVE YOU BEEN UNABLE TO REMEMBER WHAT HAPPENED THE NIGHT BEFORE BECAUSE YOU HAD BEEN DRINKING: 0

## 2021-01-29 NOTE — PATIENT INSTRUCTIONS
Personalized Preventive Plan for Adi Si - 1/29/2021  Medicare offers a range of preventive health benefits. Some of the tests and screenings are paid in full while other may be subject to a deductible, co-insurance, and/or copay. Some of these benefits include a comprehensive review of your medical history including lifestyle, illnesses that may run in your family, and various assessments and screenings as appropriate. After reviewing your medical record and screening and assessments performed today your provider may have ordered immunizations, labs, imaging, and/or referrals for you. A list of these orders (if applicable) as well as your Preventive Care list are included within your After Visit Summary for your review. Other Preventive Recommendations:    · A preventive eye exam performed by an eye specialist is recommended every 1-2 years to screen for glaucoma; cataracts, macular degeneration, and other eye disorders. · A preventive dental visit is recommended every 6 months. · Try to get at least 150 minutes of exercise per week or 10,000 steps per day on a pedometer . · Order or download the FREE \"Exercise & Physical Activity: Your Everyday Guide\" from [employer]. Call 4-840.707.3767 or search [employer] online. · You need 3973-2936 mg of calcium and 2970-7046 IU of vitamin D per day. It is possible to meet your calcium requirement with diet alone, but a vitamin D supplement is usually necessary to meet this goal.  · When exposed to the sun, use a sunscreen that protects against both UVA and UVB radiation with an SPF of 30 or greater. Reapply every 2 to 3 hours or after sweating, drying off with a towel, or swimming. · Always wear a seat belt when traveling in a car. Always wear a helmet when riding a bicycle or motorcycle.     Keeping Home a [facility] As we get older, changes in balance, gait, strength, vision, hearing, and cognition make even the most youthful senior more prone to accidents. Falls are one of the leading health risks for older people. This increased risk of falling is related to:   Aging process (eg, decreased muscle strength, slowed reflexes)   Higher incidence of chronic health problems (eg, arthritis, diabetes) that may limit mobility, agility or sensory awareness   Side effects of medicine (eg, dizziness, blurred vision)especially medicines like prescription pain medicines and drugs used to treat mental health conditions   Depending on the brittleness of your bones, the consequences of a fall can be serious and long lasting. Home Life   Research by the Association of Aging [employer]) shows that some home accidents among older adults can be prevented by making simple lifestyle changes and basic modifications and repairs to the home environment. Here are some lifestyle changes that experts recommend:   Have your hearing and vision checked regularly. Be sure to wear prescription glasses that are right for you. Speak to your doctor or pharmacist about the possible side effects of your medicines. A number of medicines can cause dizziness. If you have problems with sleep, talk to your doctor. Limit your intake of alcohol. If necessary, use a cane or walker to help maintain your balance. Wear supportive, rubber-soled shoes, even at home. If you live in a region that gets wintry weather, you may want to put special cleats on your shoes to prevent you from slipping on the snow and ice. Exercise regularly to help maintain muscle tone, agility, and balance. Always hold the banister when going up or down stairs. Also, use  bars when getting in or out of the bath or shower, or using the toilet. To avoid dizziness, get up slowly from a lying down position. Sit up first, dangling your legs for a minute or two before rising to a standing position. Overall Home Safety Check   According to the Consumer Product Safety Commision's \"Older Consumer Home Safety Checklist,\" it is important to check for potential hazards in each room. And remember, proper lighting is an essential factor in home safety. If you cannot see clearly, you are more likely to fall. Important questions to ask yourself include:   Are lamp, electric, extension, and telephone cords placed out of the flow of traffic and maintained in good condition? Have frayed cords been replaced? Are all small rugs and runners slip resistant? If not, you can secure them to the floor with a special double-sided carpet tape. Are smoke detectors properly locatedone on every floor of your home and one outside of every sleeping area? Are they in good working order? Are batteries replaced at least once a year? Do you have a well-maintained carbon monoxide detector outside every sleeping are in your home? Does your furniture layout leave plenty of space to maneuver between and around chairs, tables, beds, and sofas? Are hallways, stairs and passages between rooms well lit? Can you reach a lamp without getting out of bed? Are floor surfaces well maintained? Shag rugs, high-pile carpeting, tile floors, and polished wood floors can be particularly slippery. Stairs should always have handrails and be carpeted or fitted with a non-skid tread. Is your telephone easily reachable. Is the cord safely tucked away? Room by Room   According to the Association of Aging, bathrooms and lisa are the two most potentially hazardous rooms in your home. In the Kitchen    Be sure your stove is in proper working order and always make sure burners and the oven are off before you go out or go to sleep. Use fire-resistant mattress covers and pillows, and NEVER smoke in bed. Keep a phone next to the bed that is programmed to dial 911 at the push of a button. If you have a chronic condition, you may want to sign on with an automatic call-in service. Typically the system includes a small pendant that connects directly to an emergency medical voice-response system. You should also make arrangements to stay in contact with someonefriend, neighbor, family memberon a regular schedule. Fire Prevention   According to the [employer]. (Smoke Alarms for Every) [address] [place] [address], senior citizens are one of the two highest risk groups for death and serious injuries due to residential fires. When cooking, wear short-sleeved items, never a bulky long-sleeved robe. The [facility]'s Safety Checklist for Older Consumers emphasizes the importance of checking basements, garages, workshops and storage areas for fire hazards, such as volatile liquids, piles of old rags or clothing and overloaded circuits. Never smoke in bed or when lying down on a couch or recliner chair. Small portable electric or kerosene heaters are responsible for many home fires and should be used cautiously if at all. If you do use one, be sure to keep them away from flammable materials. In case of fire, make sure you have a pre-established emergency exit plan. Have a professional check your fireplace and other fuel-burning appliances yearly.     [employer] Baby boomers entering the hercules years will continue to see the development of new products to help older adults live safely and independently in spite of age-related changes. Making Life More Livable  , by Francie Tapia, lists over 1,000 products for \"living well in the mature years,\" such as bathing and mobility aids, household security devices, ergonomically designed knives and peelers, and faucet valves and knobs for temperature control. Medical supply stores and organizations are good sources of information about products that improve your quality of life and insure your safety.      Last Reviewed: November 2009 Marty Deshpande MD   Updated: 3/7/2011     ·

## 2021-01-29 NOTE — PROGRESS NOTES
Medicare Annual Wellness Visit  Name: Joselito Lorenzana Date: 2021   MRN: <[id number]> Sex: Male   Age: 77 y.o. Ethnicity: Non-/Non    : 1955 Race: Shlomo Latif is here for Medicare AWV    Screenings for behavioral, psychosocial and functional/safety risks, and cognitive dysfunction are all negative except as indicated below. These results, as well as other patient data from the [address] form, are documented in Flowsheets linked to this Encounter. Allergies   Allergen Reactions    Cephalexin Hives       Prior to Visit Medications    Medication Sig Taking? Authorizing Provider   clindamycin (CLEOCIN-T) 1 % external solution Apply to new lesions bid. Soheila Hallman MD   diclofenac (VOLTAREN) 75 MG EC tablet Take 1 tablet by mouth 2 times daily  Victoria Wills DO   diclofenac (VOLTAREN) 75 MG EC tablet Take 1 tablet by mouth 2 times daily  Victoria Wills DO   loratadine-pseudoephedrine (ALLERGY RELIEF D-24)  MG per extended release tablet TAKE ONE TABLET BY MOUTH DAILY  Dante Delong MD   Ascorbic Acid (VITAMIN C) 1000 MG tablet Take 1,000 mg by mouth daily  Historical Provider, MD   Denosumab (PROLIA SC) Inject into the skin every 6 months  Historical Provider, MD   donepezil (ARICEPT) 10 MG tablet Take 1 tablet by mouth nightly  Dante Delong MD   Red Yeast Rice 600 MG TABS Take 600 mg by mouth 2 times daily  Historical Provider, MD   Turmeric 500 MG TABS Take 500 mg by mouth 2 times daily  Historical Provider, MD   Cinnamon 500 MG CAPS Take 500 mg by mouth 2 times daily  Historical Provider, MD   Omega-3 Fatty Acids (FISH OIL) 1000 MG CAPS Take 1,000 mg by mouth 2 times daily   Historical Provider, MD   POTASSIUM CHLORIDE PO Take by mouth daily 99 mg takes 1/2 tab unless having cramps then takes whole tab.   Historical Provider, MD   fluticasone [facility]) 50 MCG/ACT nasal spray 2 sprays bilaterally qd  Dante Delong MD vitamin D 1000 units CAPS Take by mouth  Historical Provider, MD   Sennosides (SENNA LAXATIVE PO) Take by mouth  Historical Provider, MD   MAGNESIUM PO Take by mouth 2 times daily   Historical Provider, MD   Calcium Carb-Cholecalciferol 600-500 MG-UNIT CAPS Take 1,000 Units by mouth daily  Tiff Olson MD   leuprolide (LUPRON) 11.25 MG injection Inject 11.25 mg into the muscle every 6 months   Historical Provider, MD   acetaminophen (TYLENOL) 500 MG tablet Take 1 tablet by mouth every 6 hours as needed for Pain  Tiff Olson MD       Past Medical History:   Diagnosis Date    Actinic keratoses     Allergic rhinitis     Arthritis     Environmental allergies     Prostate cancer ([facility] [address]     PROSTATE       Past Surgical History:   Procedure Laterality Date    COLONOSCOPY  2-16-16    EPIDURAL STEROID INJECTION N/A 2/26/2019    MIDLINE LUMBAR FIVE SACRAL ONE EPIDURAL STEROID INJECTION SITE CONFIRMED BY FLUOROSCOPY performed by Wei Herring MD at [facility] N/A 7/30/2019    MIDLINE LUMBAR FIVE SACRAL ONE EPIDURAL STEROID INJECTION SITE CONFIRMED BY FLUOROSCOPY performed by Wei Herring MD at [facility] N/A 10/22/2019    MIDLINE LUMBAR FIVE SACRAL ONE EPIDURAL STEROID INJECTION SITE CONFIRMED BY FLUOROSCOPY performed by Wei Herring MD at [address] ARTHROSCOPY Left 4/26/16    partial medial and lateral meniscectomy, chondroplasty    KNEE SURGERY Right 10/15/13    RT KNEE PARTIAL MEDIAL, LATERAL MENISECTOMY AND CHONDROPLASTY WITH REMOVAL OF LOSSE HARDWARE    KNEE SURGERY Right 10/15/13    RT KNEE ARTHROSCOPY PARTIAL LATERAL AND MEDIAL MENISECTOMY WITH CHONDROPLASTY AND REMOVAL OF LOOSE BODIES    NERVE SURGERY Bilateral 9/21/2020    BILATERAL LUMBAR THREE LUMBAR FOUR LUMBAR FIVE RADIOFREQUENCY ABLATION SITE CONFIRMED BY FLUOROSCOPY performed by Wei Herring MD at [place] [address]  PAIN MANAGEMENT PROCEDURE N/A 5/18/2020    MIDLINE LUMBAR FIVE SACRAL ONE EPIDURAL STEROID INJECTION SITE CONFIRMED BY FLUOROSCOPY performed by Miladis Alicea MD at [address] [facility] Bilateral 8/24/2020    BILATERAL LUMBAR THREE, FOUR, FIVE MEDIAL BRANCH BLOCK SITE CONFIRMED BY FLUOROSCOPY performed by Miladis Alicea MD at [address] [facility] Bilateral 9/1/2020    BILATERAL LUMBAR THREE LUMBAR FOUR LUMBAR FIVE LUMBAR MEDIAL BRANCH BLOCK SITE CONFIRMED BY FLUOROSCOPY performed by Miladis Alicea MD at [facility] Left 1/4/2021    LEFT LUMBAR FIVE SACRAL ONE EPIDURAL STEROID INJECTION SITE CONFIRMED BY FLUOROSCOPY performed by Miladis [facility]a MD at [facility]  07/17/15    DaVinci prostatectomy    SHOULDER SURGERY         Family History   Problem Relation Age of Onset    Diabetes Mother     Heart Disease Mother 79        2 vessel bypass    High Blood Pressure Mother     Alzheimer's Disease Mother     Arthritis Father     Heart Disease Father 68        stents    High Blood Pressure Father     Stroke Father     Heart Disease Paternal Uncle     Alzheimer's Disease Maternal Grandmother     Cancer Paternal Grandfather     Arthritis Paternal Grandfather        CareTeam (Including outside providers/suppliers regularly involved in providing care):   Patient Care Team:  Coni Mireles MD as PCP - General  Coni Mireles MD as PCP - REHABILITATION [facility] Empaneled Provider  Attila Bauer MD (Orthopedic Surgery)  Navneet Escamilla as Physician Assistant (Orthopedic Surgery)  Rachel Ca MD (Orthopedic Surgery)    Wt Readings from Last 3 Encounters:   01/29/21 190 lb (86.2 kg)   01/06/21 190 lb (86.2 kg)   01/04/21 190 lb (86.2 kg)     Vitals:    01/29/21 0953   Temp: 98 °F (36.7 °C)   Weight: 190 lb (86.2 kg)   Height: 5' 3\" (1.6 m)     Body mass index is 33.66 kg/m². Based upon direct observation of the patient, evaluation of cognition reveals recent and remote memory intact. Patient's complete Health Risk Assessment and screening values have been reviewed and are found in Flowsheets. The following problems were reviewed today and where indicated follow up appointments were made and/or referrals ordered.     Positive Risk Factor Screenings with Interventions:           Health Habits/Nutrition:  Health Habits/Nutrition  Do you exercise for at least 20 minutes 2-3 times per week?: Yes  Have you lost any weight without trying in the past 3 months?: No  Do you eat fewer than 2 meals per day?: No  Have you seen a dentist within the past year?: (!) No  Body mass index: (!) 33.65  Health Habits/Nutrition Interventions:  · Inadequate physical activity:  educational materials provided to promote increased physical activity    Hearing/Vision:  No exam data present  Hearing/Vision  Do you or your family notice any trouble with your hearing?: (!) Yes(hearing aid)  Do you have difficulty driving, watching TV, or doing any of your daily activities because of your eyesight?: No  Have you had an eye exam within the past year?: Yes  Hearing/Vision Interventions:  · Hearing concerns:  patient declines any further evaluation/treatment for hearing issues      Personalized Preventive Plan   Current Health Maintenance Status  Immunization History   Administered Date(s) Administered    Influenza, High Dose (Fluzone 65 yrs and older) 10/01/2018, 12/02/2019    Influenza, High-dose, Quadv, 65 yrs +, IM (Fluzone) 09/29/2020    Tdap (Boostrix, Adacel) 04/10/2015        Health Maintenance   Topic Date Due    Shingles Vaccine (1 of 2) 05/08/2003    Pneumococcal 65+ years Vaccine (1 of 1 - PPSV23) 05/08/2018    Annual Wellness Visit (AWV)  05/29/2019    A1C test (Diabetic or Prediabetic)  05/20/2021    PSA counseling  12/02/2021    DTaP/Tdap/Td vaccine (2 - Td) 04/10/2025  Lipid screen  05/24/2025    Colon cancer screen colonoscopy  02/16/2026    Flu vaccine  Completed    Hepatitis C screen  Completed    Hepatitis A vaccine  Aged Out    Hepatitis B vaccine  Aged Out    Hib vaccine  Aged Out    Meningococcal (ACWY) vaccine  Aged Out     Recommendations for [employer] Due: see orders and patient instructions/AVS.  . Recommended screening schedule for the next 5-10 years is provided to the patient in written form: see Patient Instructions/AVS.    Ree MARTINEZ LPN, 4/22/2220, performed the documented evaluation under the direct supervision of the attending physician. Favian Echavarria is a 77 y.o. male being evaluated by a Virtual Visit (phone visit) encounter to address concerns as mentioned above. A caregiver was present when appropriate. Due to this being a TeleHealth encounter (During [facility]- public health emergency), evaluation of the following organ systems was limited: Vitals/Constitutional/EENT/Resp/CV/GI//MS/Neuro/Skin/Heme-Lymph-Imm. Pursuant to the emergency declaration under the [address] [place] [address], [address] [place] [address] and the [employer] Act, this Virtual Visit was conducted with patient's (and/or legal guardian's) consent, to reduce the patient's risk of exposure to COVID-19 and provide necessary medical care. The patient (and/or legal guardian) has also been advised to contact this office for worsening conditions or problems, and seek emergency medical treatment and/or call 911 if deemed necessary. Patient identification was verified at the start of the visit: Yes    Total time spent for this encounter: Not billed by time    Services were provided through a video synchronous discussion virtually to substitute for in-person clinic visit. Patient and provider were located at their individual homes. --Viviane Celeste LPN on 0/81/3914 at 2:95 AM    An electronic signature was used to authenticate this note. This encounter was performed under my, Ariel Gordillo, direct supervision, 1/29/2021.

## 2021-02-01 LAB — DERMATOLOGY PATHOLOGY REPORT: ABNORMAL

## 2021-02-02 NOTE — TELEPHONE ENCOUNTER
1/25/21 L temple biopsy result reviewed - consistent w/ early lentigo maligna (melanoma in situ)    Refer to Dr. Sonja Lee for slow Mohs  FSE q6mo x 5yrs     Mikki, please schedule pt for 6mo FSE. Spoke w/ pt re: above.

## 2021-02-02 NOTE — TELEPHONE ENCOUNTER
Tried to reach patient to schedule TBSE f/up for 8-30-21 And.   Added to list to get scheduled for the first available skin check in Aug.

## 2021-03-01 ENCOUNTER — PROCEDURE VISIT (OUTPATIENT)
Dept: SURGERY | Age: 66
End: 2021-03-01
Payer: MEDICARE

## 2021-03-01 VITALS — SYSTOLIC BLOOD PRESSURE: 158 MMHG | HEART RATE: 71 BPM | TEMPERATURE: 95.4 F | DIASTOLIC BLOOD PRESSURE: 92 MMHG

## 2021-03-01 DIAGNOSIS — D03.39 MELANOMA IN SITU OF TEMPLE REGION (HCC): Primary | ICD-10-CM

## 2021-03-01 PROCEDURE — 11642 EXC F/E/E/N/L MAL+MRG 1.1-2: CPT | Performed by: DERMATOLOGY

## 2021-03-01 NOTE — PROGRESS NOTES
LENTIGO MALIGNA STAGED SERIAL EXCISION NOTE     SURGEON: Artemio Luque. Demond Daigle MD    ASSISTANT:  Luca Bonds RN    REFERRING PHYSICIAN:  Carlena Buerger, MD    PREOPERATIVE DIAGNOSIS: Lentigo Maligna     POSTOPERATIVE DIAGNOSIS: SAME. OPERATIVE PROCEDURE: STAGED SERIAL EXCISION (SLOW MOHS)     RECONSTRUCTION OF DEFECT: DEFERRED UNTIL CONFIRMATION OF CLEARANCE OF TUMOR     LOCATION: Left temple     SIZE OF LESION: 8x8 MM     SIZE OF LESION PLUS CIRCUMFERENTIAL MARGIN OF STAGE I: 18 X 18 MM     ANESTHESIA:5 CC XYLOCAINE 1% WITH EPINEPHRINE 1:100,000, BUFFERED. DURATION OF PROCEDURE: 30 MINUTES     POSTOPERATIVE OBSERVATION: 30 MINUTES     EBL: MINIMAL. SPECIMENS: 5    COMPLICATIONS: NONE     DESCRIPTION OF PROCEDURE: The patient was given a mirror and the biopsy site was identified, marked with surgical marking pen, and verified with the patient. Written consent was obtained. There was a time out for person and procedure verification. The operative site was cleansed with Hibiclens, then cleaned off, dried, and draped sterilely. The lesion was excised in a disc design down to subcutaneous fat with 1 mm margins, placed in formalin and labelled \"A\" and sent to pathology for examination of tumor depth/clearance. Hemostasis was achieved with electrosurgery. Another 4 mm rim of tissue was taken circumferentially at the peripheral margin of the clinically apparent pigmented lesion. This was divided into 4 sections, labelled B- corresponding to section 12 o'clock to 3 o'clock, labelled C corresponding to section 3 o'clock to 6 o'clock, labelled D corresponding to 6 o'clock to 9 o'clock and E corresponding to 9 o'clock to 12 o'clock. The inner margins were inked green and sutures were placed at 12, 3, 6 and 9 o'clock for orientation. These specimens were individually placed in formalin specimen jars.      All specimens were delivered to the pathology lab ASAP following removal. A representative map was drawn and labelled and placed in the patient's chart as well as sent with the pathologic specimens. A pressure dressing was applied to the wound. The patient was given detailed oral and written instructions on home care and all questions were answered. The patient tolerated the procedure well without any complications. The patient left the office in stable condition. The patient is scheduled to return on Wednesday for Stage II or repair depending on pathology results.

## 2021-03-01 NOTE — PATIENT INSTRUCTIONS
[facility] Office Hours:    Monday-Thursday  7:30 AM-4:30 PM    Friday  9:00 AM-1:00 PM    DAILY WOUND CARE-SECOND INTENTION    1.  Keep the area absolutely dry for 24 to 48 hours. -After 24 to 48 hours you may remove the bandage and shower. ? Bleeding: If bleeding occurs, DO NOT remove the bandage. Put firm pressure on the area with gauze for 20 minutes without peeking. If the bleeding continues, apply pressure for 20 minutes more. ? If the bleeding does not stop after you apply pressure, call us right away. If you cant call, go to the nearest emergency room or urgent care facility. POST-OPERATIVE INSTRUCTIONS     1. Activity: Do not lift anything heavier than a gallon of milk for 1 week. Also, avoid strenuous activity such as running, power walking or contact sports. 2.  Eating and drinking: Do not drink alcohol for 48 hours after your procedure. Alcohol increases the chances of bleeding. 3.  Medicines:  -If you have discomfort, take acetaminophen (Tylenol or Extra Strength Tylenol). Follow the instructions and warning on the bottle. -If your doctor has prescribed you an aspirin daily, please keep taking it. Do not take extra aspirin or medicines containing aspirin (such as [place] and Excedrin) for 48 hours after your procedure. 4.  Symptoms: You may have these symptoms. They are normal and should get better with time:  A. Swelling. Swelling usually increases for 24 to 48 hours after your procedure and then begins to improve. B.  Some soreness and redness around your wound. C.  Bruising which can last for up to 2 weeks    Call us at [phone] right away if you have any of the following symptoms:  ? Bleeding that you cant stop (see above)  ? Pain that lasts longer than 48 hours  ? Your wound becomes more painful, red or hot  ?  Bruising and swelling that does not improve within 48 hours or gets worse suddenly

## 2021-03-01 NOTE — PROGRESS NOTES
PRE-PROCEDURE SCREENING    Pacemaker/ICD: No  Difficulty with numbing in the past: No  Local Anesthesia Reaction/passing out: No  Latex or adhesive allergy:  No  Bleeding/Clotting Disorders: No  Anticoagulant Therapy: No  Joint prosthesis: No  Artificial Heart Valve: No  Stroke or Seizures: No  Organ Transplant or Lymphoma: No  Immunosuppression: Yes, dx with prostate cx 6 years ago, on Lupron injection  Respiratory Problems: No

## 2021-03-03 ENCOUNTER — TELEPHONE (OUTPATIENT)
Dept: SURGERY | Age: 66
End: 2021-03-03

## 2021-03-03 ENCOUNTER — PROCEDURE VISIT (OUTPATIENT)
Dept: SURGERY | Age: 66
End: 2021-03-03
Payer: MEDICARE

## 2021-03-03 VITALS — TEMPERATURE: 96.8 F | SYSTOLIC BLOOD PRESSURE: 168 MMHG | HEART RATE: 74 BPM | DIASTOLIC BLOOD PRESSURE: 88 MMHG

## 2021-03-03 DIAGNOSIS — S01.402S: Primary | ICD-10-CM

## 2021-03-03 DIAGNOSIS — D03.30 MELANOMA IN SITU OF FACE EXCLUDING EYELID, NOSE, LIP, AND EAR (HCC): ICD-10-CM

## 2021-03-03 PROCEDURE — 12052 INTMD RPR FACE/MM 2.6-5.0 CM: CPT | Performed by: DERMATOLOGY

## 2021-03-03 NOTE — PROGRESS NOTES
PRE-PROCEDURE SCREENING    Pacemaker/ICD: No  Difficulty with numbing in the past: No  Local Anesthesia Reaction/passing out: No  Latex or adhesive allergy:  No  Bleeding/Clotting Disorders: No  Anticoagulant Therapy: No  Joint prosthesis: No  Artificial Heart Valve: No  Stroke or Seizures: No  Organ Transplant or Lymphoma: No  Immunosuppression: Yes, dx with prostate cx 6 years ago, on Lupron injection  Respiratory Problems: No
The area was cleansed with sterile saline. Petrolatum ointment was applied to the wound and a pressure dressing was applied. Detailed post-care instructions were verbally reviewed with the patient and a handout given. The patient tolerated the procedure well without any complications. The patient left the office in good condition. The patient will return for suture removal/wound check as needed.

## 2021-04-12 ENCOUNTER — OFFICE VISIT (OUTPATIENT)
Dept: FAMILY MEDICINE CLINIC | Age: 66
End: 2021-04-12
Payer: MEDICARE

## 2021-04-12 VITALS
WEIGHT: 198.7 LBS | OXYGEN SATURATION: 98 % | SYSTOLIC BLOOD PRESSURE: 152 MMHG | DIASTOLIC BLOOD PRESSURE: 90 MMHG | HEIGHT: 63 IN | HEART RATE: 78 BPM | BODY MASS INDEX: 35.21 KG/M2

## 2021-04-12 DIAGNOSIS — J30.1 SEASONAL ALLERGIC RHINITIS DUE TO POLLEN: ICD-10-CM

## 2021-04-12 DIAGNOSIS — C61 PROSTATE CANCER (HCC): ICD-10-CM

## 2021-04-12 DIAGNOSIS — R73.09 ABNORMAL GLUCOSE: Primary | ICD-10-CM

## 2021-04-12 DIAGNOSIS — E55.9 VITAMIN D DEFICIENCY: ICD-10-CM

## 2021-04-12 DIAGNOSIS — E78.5 HYPERLIPIDEMIA, UNSPECIFIED HYPERLIPIDEMIA TYPE: ICD-10-CM

## 2021-04-12 DIAGNOSIS — C43.30 MALIGNANT MELANOMA OF FACE EXCLUDING EYELID, NOSE, LIP, AND EAR (HCC): ICD-10-CM

## 2021-04-12 DIAGNOSIS — I10 ESSENTIAL HYPERTENSION: ICD-10-CM

## 2021-04-12 PROBLEM — I73.9 CLAUDICATION (HCC): Status: ACTIVE | Noted: 2021-04-12

## 2021-04-12 PROBLEM — I73.9 CLAUDICATION (HCC): Status: RESOLVED | Noted: 2021-04-12 | Resolved: 2021-04-12

## 2021-04-12 LAB
A/G RATIO: 1.6 (ref 1.1–2.2)
ALBUMIN SERPL-MCNC: 4.2 G/DL (ref 3.4–5)
ALP BLD-CCNC: 162 U/L (ref 40–129)
ALT SERPL-CCNC: 18 U/L (ref 10–40)
ANION GAP SERPL CALCULATED.3IONS-SCNC: 13 MMOL/L (ref 3–16)
AST SERPL-CCNC: 18 U/L (ref 15–37)
BILIRUB SERPL-MCNC: 0.3 MG/DL (ref 0–1)
BUN BLDV-MCNC: 23 MG/DL (ref 7–20)
CALCIUM SERPL-MCNC: 10.1 MG/DL (ref 8.3–10.6)
CHLORIDE BLD-SCNC: 105 MMOL/L (ref 99–110)
CHOLESTEROL, TOTAL: 246 MG/DL (ref 0–199)
CO2: 23 MMOL/L (ref 21–32)
CREAT SERPL-MCNC: 0.9 MG/DL (ref 0.8–1.3)
GFR AFRICAN AMERICAN: >60
GFR NON-AFRICAN AMERICAN: >60
GLOBULIN: 2.7 G/DL
GLUCOSE BLD-MCNC: 105 MG/DL (ref 70–99)
HDLC SERPL-MCNC: 58 MG/DL (ref 40–60)
LDL CHOLESTEROL CALCULATED: 149 MG/DL
POTASSIUM SERPL-SCNC: 4.8 MMOL/L (ref 3.5–5.1)
SODIUM BLD-SCNC: 141 MMOL/L (ref 136–145)
TOTAL PROTEIN: 6.9 G/DL (ref 6.4–8.2)
TRIGL SERPL-MCNC: 194 MG/DL (ref 0–150)
VITAMIN D 25-HYDROXY: 37.7 NG/ML
VLDLC SERPL CALC-MCNC: 39 MG/DL

## 2021-04-12 PROCEDURE — 36415 COLL VENOUS BLD VENIPUNCTURE: CPT | Performed by: FAMILY MEDICINE

## 2021-04-12 PROCEDURE — G8427 DOCREV CUR MEDS BY ELIG CLIN: HCPCS | Performed by: FAMILY MEDICINE

## 2021-04-12 PROCEDURE — 99214 OFFICE O/P EST MOD 30 MIN: CPT | Performed by: FAMILY MEDICINE

## 2021-04-12 PROCEDURE — 1123F ACP DISCUSS/DSCN MKR DOCD: CPT | Performed by: FAMILY MEDICINE

## 2021-04-12 PROCEDURE — 3017F COLORECTAL CA SCREEN DOC REV: CPT | Performed by: FAMILY MEDICINE

## 2021-04-12 PROCEDURE — G8417 CALC BMI ABV UP PARAM F/U: HCPCS | Performed by: FAMILY MEDICINE

## 2021-04-12 PROCEDURE — 4040F PNEUMOC VAC/ADMIN/RCVD: CPT | Performed by: FAMILY MEDICINE

## 2021-04-12 PROCEDURE — 1036F TOBACCO NON-USER: CPT | Performed by: FAMILY MEDICINE

## 2021-04-12 RX ORDER — LISINOPRIL 10 MG/1
10 TABLET ORAL DAILY
Qty: 90 TABLET | Refills: 1 | Status: SHIPPED | OUTPATIENT
Start: 2021-04-12 | End: 2021-06-28 | Stop reason: SINTOL

## 2021-04-12 ASSESSMENT — ENCOUNTER SYMPTOMS
CHEST TIGHTNESS: 0
CONSTIPATION: 1
WHEEZING: 0
BLOOD IN STOOL: 0
COUGH: 0
DIARRHEA: 0
SHORTNESS OF BREATH: 0

## 2021-04-12 NOTE — PROGRESS NOTES
Chief Complaint   Patient presents with    Annual Exam       HPI:  Maxwell Burnett is a 77 y.o. (: 1955) here today   for   Annual check up. Multiple new issues within the last year. Knee pain with arthritis. Seeing Dr. Hola Wetzel recently got gel injections. Helped for a period of time. Now sxs worsening. Plans on to return to ortho. Back pain, was getting epidural injections. Had ablation/ sciatica treated with inj. Seeing Dr. Aron Amaral. inj helped. Has been seen by chiropractor as well. Had radiofreq neurotomy. Was going for epidural inj. Melanoma side of face in march. Had mohs procedure on 3/1. Reportedly \"got it all. \"  Next appt w/ derm in     Blood pressure has been elevated. bp seems to be different at different locations. Wife concerned it has been up. States \"face has been red\" as well. No headaches, blurred vision, others. Originally thought allergies were bothering him, now deeper cough and feels like chest congestion. Felt initially began as sinus drainage. Not coughing often, but deep and productive when he does    occas uti. Will take cipro per specialist.          ROS:  Review of Systems   Constitutional: Positive for fatigue. Negative for chills and fever. Respiratory: Negative for cough, chest tightness, shortness of breath and wheezing. Cardiovascular: Negative for chest pain and palpitations. Gastrointestinal: Positive for constipation. Negative for blood in stool and diarrhea. Neurological: Negative for dizziness, light-headedness and headaches.      .    Hemoglobin A1C (%)   Date Value   2020 6.1     Microscopic Examination (no units)   Date Value   2015 Not Indicated     LDL Calculated (mg/dL)   Date Value   2020 111 (H)       Past Medical History:   Diagnosis Date    Actinic keratoses     Allergic rhinitis     Arthritis     Environmental allergies     Malignant melanoma of face excluding eyelid, nose, lip, and ear ([address]     [facility]     PROSTATE       Family History   Problem Relation Age of Onset    Diabetes Mother     Heart Disease Mother 79        2 vessel bypass    High Blood Pressure Mother     Alzheimer's Disease Mother     Arthritis Father     Heart Disease Father 68        stents    High Blood Pressure Father     Stroke Father     Heart Disease Paternal Uncle     Alzheimer's Disease Maternal Grandmother     Cancer Paternal Grandfather     Arthritis Paternal Grandfather        Social History     Socioeconomic History    Marital status:      Spouse name: Not on file    Number of children: Not on file    Years of education: Not on file    Highest education level: Not on file   Occupational History    Not on file   Social Needs    Financial resource strain: Not on file    Food insecurity     Worry: Not on file     Inability: Not on file    Transportation needs     Medical: Not on file     Non-medical: Not on file   Tobacco Use    Smoking status: Never Smoker    Smokeless tobacco: Never Used    Tobacco comment: no history smoking, used to raise a lot of tobacco   Substance and Sexual Activity    Alcohol use:  Yes     Alcohol/week: 1.0 standard drinks     Types: 1 Shots of liquor per week     Comment: nightly    Drug use: No    Sexual activity: Yes     Partners: Female   Lifestyle    Physical activity     Days per week: Not on file     Minutes per session: Not on file    Stress: Not on file   Relationships    Social connections     Talks on phone: Not on file     Gets together: Not on file     Attends Shinto service: Not on file     Active member of club or organization: Not on file     Attends meetings of clubs or organizations: Not on file     Relationship status: Not on file    Intimate partner violence     Fear of current or ex partner: Not on file     Emotionally abused: Not on file     Physically abused: Not on file     Forced sexual activity: Not on file   Other Topics Concern    Not on file   Social History Narrative    Not on file       Prior to Visit Medications    Medication Sig Taking? Authorizing Provider   lisinopril (PRINIVIL;ZESTRIL) 10 MG tablet Take 1 tablet by mouth daily Yes Gricelda Banuelos MD   clindamycin (CLEOCIN-T) 1 % external solution Apply to new lesions bid. Yes Luz Maria Magallanes MD   diclofenac (VOLTAREN) 75 MG EC tablet Take 1 tablet by mouth 2 times daily Yes Victoria Wills DO   loratadine-pseudoephedrine (ALLERGY RELIEF D-24)  MG per extended release tablet TAKE ONE TABLET BY MOUTH DAILY Yes Gricelda Banuelos MD   Ascorbic Acid (VITAMIN C) 1000 MG tablet Take 1,000 mg by mouth daily Yes Historical Provider, MD   Denosumab (PROLIA SC) Inject into the skin every 6 months Yes Historical Provider, MD   donepezil (ARICEPT) 10 MG tablet Take 1 tablet by mouth nightly Yes Gricelda Banuelos MD   Red Yeast Rice 600 MG TABS Take 600 mg by mouth 2 times daily Yes Historical Provider, MD   Turmeric 500 MG TABS Take 500 mg by mouth 2 times daily Yes Historical Provider, MD   Cinnamon 500 MG CAPS Take 500 mg by mouth 2 times daily Yes Historical Provider, MD   Omega-3 Fatty Acids (FISH OIL) 1000 MG CAPS Take 1,000 mg by mouth 2 times daily  Yes Historical Provider, MD   POTASSIUM CHLORIDE PO Take by mouth daily 99 mg takes 1/2 tab unless having cramps then takes whole tab.  Yes Historical Provider, MD   fluticasone (FLONASE) 50 MCG/ACT nasal spray 2 sprays bilaterally qd Yes Gricelda Banuelos MD   vitamin D 1000 units CAPS Take by mouth Yes Historical Provider, MD   Sennosides (SENNA LAXATIVE PO) Take by mouth Yes Historical Provider, MD   MAGNESIUM PO Take by mouth 2 times daily  Yes Historical Provider, MD   Calcium Carb-Cholecalciferol 600-500 MG-UNIT CAPS Take 1,000 Units by mouth daily Yes Emani Cox MD   leuprolide (LUPRON) 11.25 MG injection Inject 11.25 mg into the muscle every 6 months  Yes Historical Provider, MD       Allergies   Allergen Reactions    Cephalexin Hives       OBJECTIVE:    BP (!) 152/90   Pulse 78   Ht 5' 3\" (1.6 m)   Wt 198 lb 11.2 oz (90.1 kg)   SpO2 98%   BMI 35.20 kg/m²     BP Readings from Last 2 Encounters:   04/12/21 (!) 152/90   03/03/21 (!) 168/88       Wt Readings from Last 3 Encounters:   04/12/21 198 lb 11.2 oz (90.1 kg)   01/29/21 190 lb (86.2 kg)   01/06/21 190 lb (86.2 kg)       Physical Exam  Constitutional:       Appearance: Normal appearance. HENT:      Head: Normocephalic and atraumatic. Eyes:      Extraocular Movements: Extraocular movements intact. Cardiovascular:      Rate and Rhythm: Normal rate and regular rhythm. Pulmonary:      Effort: Pulmonary effort is normal.      Breath sounds: Normal breath sounds. Abdominal:      Palpations: Abdomen is soft. Tenderness: There is no abdominal tenderness. Musculoskeletal:      Right lower leg: No edema. Left lower leg: No edema. Skin:     General: Skin is warm and dry. Neurological:      General: No focal deficit present. Mental Status: He is alert and oriented to person, place, and time. Psychiatric:         Mood and Affect: Mood normal.         Behavior: Behavior normal.           ASSESSMENT/PLAN:    Andrey Abdul was seen today for annual exam.    Diagnoses and all orders for this visit:    Abnormal glucose  -     Hemoglobin A1C  Rpt labs today. Prostate cancer ([facility]  -     Testosterone, free, total  -     Vitamin D 25 Hydroxy  Getting lupron. Concern re bone density. Rpt labs. Specialist ordering psa  Malignant melanoma of face excluding eyelid, nose, lip, and ear ([address]  Reviewed derm note. F/u there as sched. Seasonal allergic rhinitis due to pollen  sats normal.  Lungs sound clear. Call if sxs worsen  Hyperlipidemia, unspecified hyperlipidemia type  -     COMPREHENSIVE METABOLIC PANEL  -     Lipid Panel  Rpt labs  Essential hypertension  -     lisinopril (PRINIVIL;ZESTRIL) 10 MG tablet;  Take 1 tablet by mouth daily  bp has been elevated fairly consistently.   Add low dose of med as below  Vitamin D deficiency  -     Vitamin D 25 Hydroxy  Concern re bone density given prostate CA, other joint issues, treatment      F/u w/ ortho for knee and back as planned

## 2021-04-13 DIAGNOSIS — R74.8 ELEVATED ALKALINE PHOSPHATASE LEVEL: Primary | ICD-10-CM

## 2021-04-13 DIAGNOSIS — R74.8 ELEVATED ALKALINE PHOSPHATASE LEVEL: ICD-10-CM

## 2021-04-13 LAB
ESTIMATED AVERAGE GLUCOSE: 134.1 MG/DL
HBA1C MFR BLD: 6.3 %

## 2021-04-13 NOTE — RESULT ENCOUNTER NOTE
Alk phos elevated, o/w cmp ok. See if can add isoenzymes. Chol more elevated than last check. Can he take any chol med? Vit d ok. Testosterone and ha1c pending.

## 2021-04-13 NOTE — RESULT ENCOUNTER NOTE
Sugars a little higher than last check. Similar to 2 yrs ago. Work on diet and improved activity. Rpt in 6 mo.

## 2021-04-14 LAB
SEX HORMONE BINDING GLOBULIN: 57 NMOL/L (ref 11–80)
TESTOSTERONE FREE-NONMALE: ABNORMAL PG/ML (ref 47–244)
TESTOSTERONE TOTAL: <3 NG/DL (ref 220–1000)

## 2021-04-16 LAB
ALK PHOS OTHER CALC: 0 U/L
ALK PHOSPHATASE: 157 U/L (ref 40–120)
ALKALINE PHOSPHATASE BONE FRACTION: 52 U/L (ref 0–55)
ALKALINE PHOSPHATASE LIVER FRACTION: 105 U/L (ref 0–94)

## 2021-04-20 DIAGNOSIS — R74.8 ABNORMAL ALKALINE PHOSPHATASE TEST: Primary | ICD-10-CM

## 2021-04-21 ENCOUNTER — HOSPITAL ENCOUNTER (OUTPATIENT)
Dept: ULTRASOUND IMAGING | Age: 66
Discharge: HOME | End: 2021-04-21
Payer: MEDICARE

## 2021-04-21 DIAGNOSIS — R74.8 ABNORMAL ALKALINE PHOSPHATASE TEST: ICD-10-CM

## 2021-04-21 PROCEDURE — 76705 ECHO EXAM OF ABDOMEN: CPT

## 2021-04-21 NOTE — RESULT ENCOUNTER NOTE
Suspect fatty liver as cause of inc in alk phos. Some hydronephrosis noted on u/s. Right sided. rec send copy to his urologist and f/u there.

## 2021-05-09 ENCOUNTER — PATIENT MESSAGE (OUTPATIENT)
Dept: FAMILY MEDICINE CLINIC | Age: 66
End: 2021-05-09

## 2021-05-09 DIAGNOSIS — R41.3 MEMORY DISTURBANCE: ICD-10-CM

## 2021-05-10 RX ORDER — DONEPEZIL HYDROCHLORIDE 10 MG/1
10 TABLET, FILM COATED ORAL NIGHTLY
Qty: 90 TABLET | Refills: 3 | Status: SHIPPED | OUTPATIENT
Start: 2021-05-10 | End: 2022-05-12 | Stop reason: SDUPTHER

## 2021-05-10 NOTE — TELEPHONE ENCOUNTER
From: Dala Primrose  To: Karen Varghese MD  Sent: 5/9/2021 9:22 PM EDT  Subject: Prescription Question    My prescription for Donepezil 10mg has no more refills. Please issue new prescription to [facility], [address] [place] [address] Road      Thank you

## 2021-06-07 ENCOUNTER — HOSPITAL ENCOUNTER (OUTPATIENT)
Age: 66
Discharge: HOME | End: 2021-06-07
Payer: MEDICARE

## 2021-06-07 PROCEDURE — 36415 COLL VENOUS BLD VENIPUNCTURE: CPT

## 2021-06-07 PROCEDURE — 84153 ASSAY OF PSA TOTAL: CPT

## 2021-06-08 LAB — PROSTATE SPECIFIC ANTIGEN: <0.01 NG/ML (ref 0–4)

## 2021-06-28 ENCOUNTER — TELEPHONE (OUTPATIENT)
Dept: FAMILY MEDICINE CLINIC | Age: 66
End: 2021-06-28

## 2021-06-28 ENCOUNTER — OFFICE VISIT (OUTPATIENT)
Dept: FAMILY MEDICINE CLINIC | Age: 66
End: 2021-06-28
Payer: MEDICARE

## 2021-06-28 VITALS
BODY MASS INDEX: 31.18 KG/M2 | DIASTOLIC BLOOD PRESSURE: 84 MMHG | WEIGHT: 176 LBS | HEART RATE: 96 BPM | OXYGEN SATURATION: 96 % | SYSTOLIC BLOOD PRESSURE: 132 MMHG

## 2021-06-28 DIAGNOSIS — T78.40XA ALLERGIC REACTION TO DRUG, INITIAL ENCOUNTER: ICD-10-CM

## 2021-06-28 DIAGNOSIS — T78.3XXA ANGIOEDEMA, INITIAL ENCOUNTER: Primary | ICD-10-CM

## 2021-06-28 DIAGNOSIS — I10 ESSENTIAL HYPERTENSION: ICD-10-CM

## 2021-06-28 PROCEDURE — 1036F TOBACCO NON-USER: CPT | Performed by: NURSE PRACTITIONER

## 2021-06-28 PROCEDURE — 3017F COLORECTAL CA SCREEN DOC REV: CPT | Performed by: NURSE PRACTITIONER

## 2021-06-28 PROCEDURE — 99214 OFFICE O/P EST MOD 30 MIN: CPT | Performed by: NURSE PRACTITIONER

## 2021-06-28 PROCEDURE — G8417 CALC BMI ABV UP PARAM F/U: HCPCS | Performed by: NURSE PRACTITIONER

## 2021-06-28 PROCEDURE — 1123F ACP DISCUSS/DSCN MKR DOCD: CPT | Performed by: NURSE PRACTITIONER

## 2021-06-28 PROCEDURE — 4040F PNEUMOC VAC/ADMIN/RCVD: CPT | Performed by: NURSE PRACTITIONER

## 2021-06-28 PROCEDURE — G8427 DOCREV CUR MEDS BY ELIG CLIN: HCPCS | Performed by: NURSE PRACTITIONER

## 2021-06-28 RX ORDER — HYDROCHLOROTHIAZIDE 12.5 MG/1
12.5 CAPSULE, GELATIN COATED ORAL EVERY MORNING
Qty: 30 CAPSULE | Refills: 1 | Status: SHIPPED | OUTPATIENT
Start: 2021-06-28 | End: 2021-08-25 | Stop reason: SDUPTHER

## 2021-06-28 ASSESSMENT — ENCOUNTER SYMPTOMS
COLOR CHANGE: 0
SINUS PAIN: 0
DIARRHEA: 0
VOMITING: 0
CHOKING: 0
PHOTOPHOBIA: 0
BLOOD IN STOOL: 0
SHORTNESS OF BREATH: 0
EYE DISCHARGE: 0
CONSTIPATION: 0
EYE REDNESS: 0
ROS SKIN COMMENTS: ANGIOEDEMA
STRIDOR: 0
TROUBLE SWALLOWING: 1
EYE PAIN: 0
SORE THROAT: 0
WHEEZING: 0
RHINORRHEA: 0
COUGH: 0
BACK PAIN: 0
NAUSEA: 0
VOICE CHANGE: 0
EYE ITCHING: 0
ABDOMINAL PAIN: 0
CHEST TIGHTNESS: 0
SINUS PRESSURE: 0

## 2021-06-28 NOTE — TELEPHONE ENCOUNTER
Patient with swollen tongue, lips and throat, thinks from lisinopril. Wife who is ER nurse gave him bendadryl and pepcid since he took lisinopril this am. Do you want him to come in or just change medication?

## 2021-06-28 NOTE — PROGRESS NOTES
Chief Complaint   Patient presents with    Allergic Reaction     lip swelling        /84   Pulse 96   Wt 176 lb (79.8 kg)   SpO2 96%   BMI 31.18 kg/m²     HPI:  Jessica Lal is a 74 y.o. (: 1955) here today   for   HPI    Patient's medications, allergies, past medical, surgical, social and family histories were reviewed and updated as appropriate. Allergic reaction: He woke up at 2 am with swollen lips, throat, and tongue. Denies trouble breathing. Has trouble swallowing. ER nurse wife gave him benadryl and Pepcid. Swelling has gone down. Denies new medications and has been on a new diet for two months. Stopped lisinopril. He has lost some weight due to healthy eating. BP WNL in office. Will restart HCTZ and have him follow up in 2 weeks. ROS:  Review of Systems   Constitutional: Negative for activity change, appetite change, chills, diaphoresis, fatigue, fever and unexpected weight change. HENT: Positive for trouble swallowing. Negative for congestion, ear discharge, ear pain, hearing loss, nosebleeds, postnasal drip, rhinorrhea, sinus pressure, sinus pain, sneezing, sore throat, tinnitus and voice change. Eyes: Negative for photophobia, pain, discharge, redness and itching. Respiratory: Negative for cough, choking, chest tightness, shortness of breath, wheezing and stridor. Cardiovascular: Negative for chest pain, palpitations and leg swelling. Gastrointestinal: Negative for abdominal pain, blood in stool, constipation, diarrhea, nausea and vomiting. Endocrine: Negative for cold intolerance, heat intolerance, polydipsia and polyuria. Genitourinary: Negative for difficulty urinating, dysuria, enuresis, flank pain, frequency, hematuria and urgency. Musculoskeletal: Negative for back pain, gait problem, joint swelling, neck pain and neck stiffness. Skin: Negative for color change, pallor, rash and wound.         Angioedema     Allergic/Immunologic: Negative for environmental allergies and food allergies. Neurological: Negative for dizziness, tremors, syncope, speech difficulty, weakness, light-headedness, numbness and headaches. Hematological: Negative for adenopathy. Does not bruise/bleed easily. Psychiatric/Behavioral: Negative for agitation, behavioral problems, confusion, decreased concentration, dysphoric mood, hallucinations, self-injury, sleep disturbance and suicidal ideas. The patient is not nervous/anxious and is not hyperactive. Prior to Visit Medications    Medication Sig Taking? Authorizing Provider   hydroCHLOROthiazide (MICROZIDE) 12.5 MG capsule Take 1 capsule by mouth every morning Yes WILI Tinoco CNP   donepezil (ARICEPT) 10 MG tablet Take 1 tablet by mouth nightly Yes WILI Tinoco CNP   clindamycin (CLEOCIN-T) 1 % external solution Apply to new lesions bid. Yes Héctor Navarrete MD   diclofenac (VOLTAREN) 75 MG EC tablet Take 1 tablet by mouth 2 times daily Yes Victoria Wills DO   loratadine-pseudoephedrine (ALLERGY RELIEF D-24)  MG per extended release tablet TAKE ONE TABLET BY MOUTH DAILY Yes Vivi Lee MD   Ascorbic Acid (VITAMIN C) 1000 MG tablet Take 1,000 mg by mouth daily Yes Historical Provider, MD   Denosumab (PROLIA SC) Inject into the skin every 6 months Yes Historical Provider, MD   Red Yeast Rice 600 MG TABS Take 600 mg by mouth 2 times daily Yes Historical Provider, MD   Turmeric 500 MG TABS Take 500 mg by mouth 2 times daily Yes Historical Provider, MD   Cinnamon 500 MG CAPS Take 500 mg by mouth 2 times daily Yes Historical Provider, MD   Omega-3 Fatty Acids (FISH OIL) 1000 MG CAPS Take 1,000 mg by mouth 2 times daily  Yes Historical Provider, MD   POTASSIUM CHLORIDE PO Take by mouth daily 99 mg takes 1/2 tab unless having cramps then takes whole tab.  Yes Historical Provider, MD   fluticasone (FLONASE) 50 MCG/ACT nasal spray 2 sprays bilaterally qd Yes Vivi Lee MD   vitamin D 1000 units CAPS Take by mouth Yes Historical Provider, MD   Sennosides (SENNA LAXATIVE PO) Take by mouth Yes Historical Provider, MD   MAGNESIUM PO Take by mouth 2 times daily  Yes Historical Provider, MD   Calcium Carb-Cholecalciferol 600-500 MG-UNIT CAPS Take 1,000 Units by mouth daily Yes Randy Rodríguez MD   leuprolide (LUPRON) 11.25 MG injection Inject 11.25 mg into the muscle every 6 months  Yes Historical Provider, MD       Allergies   Allergen Reactions    Cephalexin Hives    Lisinopril      angio edema        OBJECTIVE:      BP Readings from Last 2 Encounters:   06/28/21 132/84   04/12/21 (!) 152/90       Wt Readings from Last 3 Encounters:   06/28/21 176 lb (79.8 kg)   04/12/21 198 lb 11.2 oz (90.1 kg)   01/29/21 190 lb (86.2 kg)       Physical Exam  Vitals reviewed. Constitutional:       General: He is not in acute distress. Appearance: Normal appearance. He is well-developed. HENT:      Head: Normocephalic and atraumatic. Right Ear: Hearing and external ear normal.      Left Ear: Hearing and external ear normal.      Nose: Nose normal.      Right Sinus: No maxillary sinus tenderness or frontal sinus tenderness. Left Sinus: No maxillary sinus tenderness or frontal sinus tenderness. Mouth/Throat:      Pharynx: No oropharyngeal exudate. Eyes:      General:         Right eye: No discharge. Left eye: No discharge. Conjunctiva/sclera: Conjunctivae normal.      Pupils: Pupils are equal, round, and reactive to light. Neck:      Thyroid: No thyromegaly. Vascular: No JVD. Trachea: No tracheal deviation. Cardiovascular:      Rate and Rhythm: Normal rate and regular rhythm. Heart sounds: Normal heart sounds. No murmur heard. No friction rub. Pulmonary:      Effort: Pulmonary effort is normal. No respiratory distress. Breath sounds: Normal breath sounds. No stridor. No decreased breath sounds, wheezing, rhonchi or rales.    Musculoskeletal:         General: No tenderness. Normal range of motion. Cervical back: Normal range of motion. Lymphadenopathy:      Cervical: No cervical adenopathy. Skin:     General: Skin is warm and dry. Capillary Refill: Capillary refill takes less than 2 seconds. Findings: No rash. Comments: Slight right upper lip swelling   Neurological:      Mental Status: He is alert and oriented to person, place, and time. Sensory: Sensation is intact. Motor: Motor function is intact. Coordination: Coordination normal.   Psychiatric:         Attention and Perception: Attention and perception normal.         Mood and Affect: Mood normal.         Speech: Speech normal.         Behavior: Behavior normal. Behavior is cooperative. Thought Content: Thought content normal.         Cognition and Memory: Cognition normal.         Judgment: Judgment normal.         ASSESSMENT/PLAN:    1. Angioedema, initial encounter    He will take Pepcid and allergy med every day for 2 weeks. Benadryl every other day for a week and call if swelling returns. Will consider steroid at that time. Stopped lisinopril and started HCTZ, reviewed kidney function. Will follow up in 2 weeks, May not need BP Meds if he continues to eat healthy and lose weight. 2. Allergic reaction to drug, initial encounter    - See above    3. Essential hypertension  - BP log given    - hydroCHLOROthiazide (MICROZIDE) 12.5 MG capsule; Take 1 capsule by mouth every morning  Dispense: 30 capsule; Refill: 1    Advised to go to the ER if this occurs again. Follow up if symptoms do not improve or worsen. If the patient becomes short of breath go straight to the ER or call 911.

## 2021-07-12 ENCOUNTER — OFFICE VISIT (OUTPATIENT)
Dept: FAMILY MEDICINE CLINIC | Age: 66
End: 2021-07-12
Payer: MEDICARE

## 2021-07-12 VITALS
DIASTOLIC BLOOD PRESSURE: 76 MMHG | BODY MASS INDEX: 31.18 KG/M2 | SYSTOLIC BLOOD PRESSURE: 122 MMHG | HEART RATE: 66 BPM | OXYGEN SATURATION: 96 % | HEIGHT: 63 IN | WEIGHT: 176 LBS

## 2021-07-12 DIAGNOSIS — R73.09 ABNORMAL GLUCOSE: ICD-10-CM

## 2021-07-12 DIAGNOSIS — I10 ESSENTIAL HYPERTENSION: Primary | ICD-10-CM

## 2021-07-12 PROCEDURE — 1123F ACP DISCUSS/DSCN MKR DOCD: CPT | Performed by: FAMILY MEDICINE

## 2021-07-12 PROCEDURE — 1036F TOBACCO NON-USER: CPT | Performed by: FAMILY MEDICINE

## 2021-07-12 PROCEDURE — 3017F COLORECTAL CA SCREEN DOC REV: CPT | Performed by: FAMILY MEDICINE

## 2021-07-12 PROCEDURE — 99214 OFFICE O/P EST MOD 30 MIN: CPT | Performed by: FAMILY MEDICINE

## 2021-07-12 PROCEDURE — 4040F PNEUMOC VAC/ADMIN/RCVD: CPT | Performed by: FAMILY MEDICINE

## 2021-07-12 PROCEDURE — G8417 CALC BMI ABV UP PARAM F/U: HCPCS | Performed by: FAMILY MEDICINE

## 2021-07-12 PROCEDURE — G8427 DOCREV CUR MEDS BY ELIG CLIN: HCPCS | Performed by: FAMILY MEDICINE

## 2021-07-12 ASSESSMENT — ENCOUNTER SYMPTOMS: SHORTNESS OF BREATH: 0

## 2021-07-12 NOTE — PROGRESS NOTES
Chief Complaint   Patient presents with    Hypertension       HPI:  Anderson Buckley is a 74 y.o. (: 1955) here today   for follow up on blood pressure. Hypertension  This is a chronic problem. The problem is unchanged. The problem is controlled. Pertinent negatives include no headaches or shortness of breath. Risk factors for coronary artery disease include male gender. Past treatments include diuretics. The current treatment provides moderate improvement. lisin stopped due to apparent angioedema. On hctz.  jenny med well. Has been working on diet. Has lost weight. Has dec carbs. Ongoing issues w/ knees. Had prior injections. Ongoing sxs. Seen by ortho. Plans on having done by  [facility]. Patient's medications, allergies, past medical, surgical, social and family histories were reviewed and updated as appropriate. ROS:  Review of Systems   Constitutional: Negative for fever. Respiratory: Negative for shortness of breath. Musculoskeletal: Positive for arthralgias. Neurological: Negative for headaches.            Hemoglobin A1C (%)   Date Value   2021 6.3     Microscopic Examination (no units)   Date Value   2015 Not Indicated     LDL Calculated (mg/dL)   Date Value   2021 149 (H)       Past Medical History:   Diagnosis Date    Actinic keratoses     Allergic rhinitis     Arthritis     Environmental allergies     Malignant melanoma of face excluding eyelid, nose, lip, and ear ([address]     Prostate cancer ([address]     PROSTATE       Family History   Problem Relation Age of Onset    Diabetes Mother     Heart Disease Mother 79        2 vessel bypass    High Blood Pressure Mother     Alzheimer's Disease Mother     Arthritis Father     Heart Disease Father 68        stents    High Blood Pressure Father     Stroke Father     Heart Disease Paternal Uncle     Alzheimer's Disease Maternal Grandmother     Cancer Paternal Grandfather     Arthritis Paternal new lesions bid. Yes Ashely Dia MD   diclofenac (VOLTAREN) 75 MG EC tablet Take 1 tablet by mouth 2 times daily Yes Victoria Wills DO   loratadine-pseudoephedrine (ALLERGY RELIEF D-24)  MG per extended release tablet TAKE ONE TABLET BY MOUTH DAILY Yes Magui Mahmood MD   Ascorbic Acid (VITAMIN C) 1000 MG tablet Take 1,000 mg by mouth daily Yes Historical Provider, MD   Denosumab (PROLIA SC) Inject into the skin every 6 months Yes Historical Provider, MD   Red Yeast Rice 600 MG TABS Take 600 mg by mouth 2 times daily Yes Historical Provider, MD   Turmeric 500 MG TABS Take 500 mg by mouth 2 times daily Yes Historical Provider, MD   Cinnamon 500 MG CAPS Take 500 mg by mouth 2 times daily Yes Historical Provider, MD   Omega-3 Fatty Acids (FISH OIL) 1000 MG CAPS Take 1,000 mg by mouth 2 times daily  Yes Historical Provider, MD   POTASSIUM CHLORIDE PO Take by mouth daily 99 mg takes 1/2 tab unless having cramps then takes whole tab.  Yes Historical Provider, MD   fluticasone (FLONASE) 50 MCG/ACT nasal spray 2 sprays bilaterally qd Yes Magui Mahmood MD   vitamin D 1000 units CAPS Take by mouth Yes Historical Provider, MD   Sennosides (SENNA LAXATIVE PO) Take by mouth Yes Historical Provider, MD   MAGNESIUM PO Take by mouth 2 times daily  Yes Historical Provider, MD   Calcium Carb-Cholecalciferol 600-500 MG-UNIT CAPS Take 1,000 Units by mouth daily Yes Gabriela Lucero MD   leuprolide (LUPRON) 11.25 MG injection Inject 11.25 mg into the muscle every 6 months  Yes Historical Provider, MD       Allergies   Allergen Reactions    Cephalexin Hives    Lisinopril      angio edema        OBJECTIVE:    /76   Pulse 66   Ht 5' 3\" (1.6 m)   Wt 176 lb (79.8 kg)   SpO2 96%   BMI 31.18 kg/m²     BP Readings from Last 2 Encounters:   07/12/21 122/76   06/28/21 132/84       Wt Readings from Last 3 Encounters:   07/12/21 176 lb (79.8 kg)   06/28/21 176 lb (79.8 kg)   04/12/21 198 lb 11.2 oz (90.1 kg)       Physical Exam  Constitutional:       Appearance: Normal appearance. HENT:      Head: Normocephalic and atraumatic. Eyes:      Extraocular Movements: Extraocular movements intact. Cardiovascular:      Rate and Rhythm: Normal rate and regular rhythm. Pulmonary:      Effort: Pulmonary effort is normal.      Breath sounds: Normal breath sounds. Abdominal:      Palpations: Abdomen is soft. Tenderness: There is no abdominal tenderness. Musculoskeletal:      Right lower leg: No edema. Left lower leg: No edema. Skin:     General: Skin is warm and dry. Neurological:      General: No focal deficit present. Mental Status: He is alert and oriented to person, place, and time. Psychiatric:         Mood and Affect: Mood normal.         Behavior: Behavior normal.           ASSESSMENT/PLAN:    1. Essential hypertension  Has been elevated at home, but ok here. Cont meds. Angioedema resolved. Rpt labs and f/u as sched. Cont hctz for now. - Lipid Panel; Future  - Comprehensive Metabolic Panel; Future    2. Abnormal glucose  Rpt labs w/ next draw.    - Hemoglobin A1C; Future

## 2021-07-25 ENCOUNTER — PATIENT MESSAGE (OUTPATIENT)
Dept: FAMILY MEDICINE CLINIC | Age: 66
End: 2021-07-25

## 2021-07-25 DIAGNOSIS — J30.1 SEASONAL ALLERGIC RHINITIS DUE TO POLLEN: ICD-10-CM

## 2021-07-26 RX ORDER — LORATADINE AND PSEUDOEPHEDRINE SULFATE 10; 240 MG/1; MG/1
TABLET, FILM COATED, EXTENDED RELEASE ORAL
Qty: 90 TABLET | Refills: 3 | Status: SHIPPED | OUTPATIENT
Start: 2021-07-26 | End: 2022-05-31 | Stop reason: SDUPTHER

## 2021-07-26 NOTE — TELEPHONE ENCOUNTER
From: Braulio Miguel  To: Khadra Orta MD  Sent: 7/25/2021 7:07 PM EDT  Subject: Prescription Question    I need to get my prescription for loratadine- D, 24 renewed. Please send to [facility] pharmacy in [place], using [address].   Thanks

## 2021-08-15 ENCOUNTER — HOSPITAL ENCOUNTER (OUTPATIENT)
Age: 66
Discharge: HOME | End: 2021-08-15
Payer: MEDICARE

## 2021-08-15 DIAGNOSIS — I10 ESSENTIAL HYPERTENSION: ICD-10-CM

## 2021-08-15 DIAGNOSIS — R73.09 ABNORMAL GLUCOSE: ICD-10-CM

## 2021-08-15 LAB
A/G RATIO: 1.6 (ref 1.1–2.2)
ALBUMIN SERPL-MCNC: 4.2 G/DL (ref 3.4–5)
ALP BLD-CCNC: 105 U/L (ref 40–129)
ALT SERPL-CCNC: 19 U/L (ref 10–40)
ANION GAP SERPL CALCULATED.3IONS-SCNC: 11 MMOL/L (ref 3–16)
AST SERPL-CCNC: 19 U/L (ref 15–37)
BILIRUB SERPL-MCNC: 0.3 MG/DL (ref 0–1)
BUN BLDV-MCNC: 46 MG/DL (ref 7–20)
CALCIUM SERPL-MCNC: 10 MG/DL (ref 8.3–10.6)
CHLORIDE BLD-SCNC: 106 MMOL/L (ref 99–110)
CO2: 24 MMOL/L (ref 21–32)
CREAT SERPL-MCNC: 1 MG/DL (ref 0.8–1.3)
GFR AFRICAN AMERICAN: >60
GFR NON-AFRICAN AMERICAN: >60
GLOBULIN: 2.7 G/DL
GLUCOSE BLD-MCNC: 98 MG/DL (ref 70–99)
POTASSIUM SERPL-SCNC: 4.2 MMOL/L (ref 3.5–5.1)
SODIUM BLD-SCNC: 141 MMOL/L (ref 136–145)
TOTAL PROTEIN: 6.9 G/DL (ref 6.4–8.2)

## 2021-08-15 PROCEDURE — 80053 COMPREHEN METABOLIC PANEL: CPT

## 2021-08-15 PROCEDURE — 36415 COLL VENOUS BLD VENIPUNCTURE: CPT

## 2021-08-15 PROCEDURE — 83036 HEMOGLOBIN GLYCOSYLATED A1C: CPT

## 2021-08-15 PROCEDURE — 80061 LIPID PANEL: CPT

## 2021-08-16 LAB
CHOLESTEROL, TOTAL: 246 MG/DL (ref 0–199)
ESTIMATED AVERAGE GLUCOSE: 119.8 MG/DL
HBA1C MFR BLD: 5.8 %
HDLC SERPL-MCNC: 78 MG/DL (ref 40–60)
LDL CHOLESTEROL CALCULATED: 139 MG/DL
TRIGL SERPL-MCNC: 147 MG/DL (ref 0–150)
VLDLC SERPL CALC-MCNC: 29 MG/DL

## 2021-08-25 ENCOUNTER — OFFICE VISIT (OUTPATIENT)
Dept: FAMILY MEDICINE CLINIC | Age: 66
End: 2021-08-25
Payer: MEDICARE

## 2021-08-25 VITALS
HEART RATE: 74 BPM | OXYGEN SATURATION: 95 % | SYSTOLIC BLOOD PRESSURE: 130 MMHG | BODY MASS INDEX: 30.44 KG/M2 | WEIGHT: 171.8 LBS | HEIGHT: 63 IN | DIASTOLIC BLOOD PRESSURE: 76 MMHG

## 2021-08-25 DIAGNOSIS — I10 ESSENTIAL HYPERTENSION: Primary | ICD-10-CM

## 2021-08-25 DIAGNOSIS — M17.0 PRIMARY OSTEOARTHRITIS OF BOTH KNEES: ICD-10-CM

## 2021-08-25 DIAGNOSIS — R41.3 MEMORY DISTURBANCE: ICD-10-CM

## 2021-08-25 DIAGNOSIS — Z23 NEED FOR VACCINATION FOR PNEUMOCOCCUS: ICD-10-CM

## 2021-08-25 DIAGNOSIS — E78.5 HYPERLIPIDEMIA, UNSPECIFIED HYPERLIPIDEMIA TYPE: ICD-10-CM

## 2021-08-25 DIAGNOSIS — C61 PROSTATE CANCER (HCC): ICD-10-CM

## 2021-08-25 DIAGNOSIS — R73.09 ABNORMAL GLUCOSE: ICD-10-CM

## 2021-08-25 PROCEDURE — G8427 DOCREV CUR MEDS BY ELIG CLIN: HCPCS | Performed by: FAMILY MEDICINE

## 2021-08-25 PROCEDURE — G0009 ADMIN PNEUMOCOCCAL VACCINE: HCPCS | Performed by: FAMILY MEDICINE

## 2021-08-25 PROCEDURE — 1036F TOBACCO NON-USER: CPT | Performed by: FAMILY MEDICINE

## 2021-08-25 PROCEDURE — 90732 PPSV23 VACC 2 YRS+ SUBQ/IM: CPT | Performed by: FAMILY MEDICINE

## 2021-08-25 PROCEDURE — 99214 OFFICE O/P EST MOD 30 MIN: CPT | Performed by: FAMILY MEDICINE

## 2021-08-25 PROCEDURE — G8417 CALC BMI ABV UP PARAM F/U: HCPCS | Performed by: FAMILY MEDICINE

## 2021-08-25 PROCEDURE — 4040F PNEUMOC VAC/ADMIN/RCVD: CPT | Performed by: FAMILY MEDICINE

## 2021-08-25 PROCEDURE — 3017F COLORECTAL CA SCREEN DOC REV: CPT | Performed by: FAMILY MEDICINE

## 2021-08-25 PROCEDURE — 1123F ACP DISCUSS/DSCN MKR DOCD: CPT | Performed by: FAMILY MEDICINE

## 2021-08-25 RX ORDER — ZINC SULFATE 50(220)MG
50 CAPSULE ORAL DAILY
COMMUNITY

## 2021-08-25 RX ORDER — HYDROCHLOROTHIAZIDE 12.5 MG/1
12.5 CAPSULE, GELATIN COATED ORAL EVERY MORNING
Qty: 90 CAPSULE | Refills: 3 | Status: SHIPPED | OUTPATIENT
Start: 2021-08-25 | End: 2022-08-15

## 2021-08-25 SDOH — ECONOMIC STABILITY: FOOD INSECURITY: WITHIN THE PAST 12 MONTHS, YOU WORRIED THAT YOUR FOOD WOULD RUN OUT BEFORE YOU GOT MONEY TO BUY MORE.: NEVER TRUE

## 2021-08-25 SDOH — ECONOMIC STABILITY: FOOD INSECURITY: WITHIN THE PAST 12 MONTHS, THE FOOD YOU BOUGHT JUST DIDN'T LAST AND YOU DIDN'T HAVE MONEY TO GET MORE.: NEVER TRUE

## 2021-08-25 ASSESSMENT — ENCOUNTER SYMPTOMS
CONSTIPATION: 0
SHORTNESS OF BREATH: 0
BLOOD IN STOOL: 0
DIARRHEA: 0
CHEST TIGHTNESS: 0
BLURRED VISION: 0

## 2021-08-25 ASSESSMENT — SOCIAL DETERMINANTS OF HEALTH (SDOH): HOW HARD IS IT FOR YOU TO PAY FOR THE VERY BASICS LIKE FOOD, HOUSING, MEDICAL CARE, AND HEATING?: NOT HARD AT ALL

## 2021-08-25 NOTE — PROGRESS NOTES
Chief Complaint   Patient presents with    Hypertension       HPI:  Abdoulaye Salomon is a 74 y.o. (: 1955) here today   for   Hypertension  This is a chronic problem. The current episode started more than 1 year ago. The problem is unchanged. The problem is controlled. Pertinent negatives include no blurred vision, chest pain, headaches, malaise/fatigue, palpitations or shortness of breath. There are no associated agents to hypertension. Risk factors for coronary artery disease include male gender. Past treatments include diuretics. The current treatment provides mild improvement. There are no compliance problems. Having knee replacements, first one in December. To be done by Dr Johnathon Nice. Was seen by spine specialist.  Had injection for sciatica. Seems to be helping. Has not been checking bp    Overall chol improved. Working on diet. Memory similar to prior. Patient's medications, allergies, past medical, surgical, social and family histories were reviewed and updated as appropriate. ROS:  Review of Systems   Constitutional: Negative for chills, fatigue, fever and malaise/fatigue. Eyes: Negative for blurred vision. Respiratory: Negative for chest tightness and shortness of breath. Cardiovascular: Negative for chest pain and palpitations. Gastrointestinal: Negative for blood in stool, constipation and diarrhea. Neurological: Negative for dizziness, light-headedness and headaches.            Hemoglobin A1C (%)   Date Value   08/15/2021 5.8     Microscopic Examination (no units)   Date Value   2015 Not Indicated     LDL Calculated (mg/dL)   Date Value   08/15/2021 139 (H)       Past Medical History:   Diagnosis Date    Actinic keratoses     Allergic rhinitis     Arthritis     Environmental allergies     Malignant melanoma of face excluding eyelid, nose, lip, and ear ([address]     Prostate cancer ([address]     PROSTATE       Family History   Problem Relation Age of Onset  Diabetes Mother     Heart Disease Mother 79        2 vessel bypass    High Blood Pressure Mother     Alzheimer's Disease Mother     Arthritis Father     Heart Disease Father 68        stents    High Blood Pressure Father     Stroke Father     Heart Disease Paternal Uncle     Alzheimer's Disease Maternal Grandmother     Cancer Paternal Grandfather     Arthritis Paternal Grandfather        Social History     Socioeconomic History    Marital status:      Spouse name: Not on file    Number of children: Not on file    Years of education: Not on file    Highest education level: Not on file   Occupational History    Not on file   Tobacco Use    Smoking status: Never Smoker    Smokeless tobacco: Never Used    Tobacco comment: no history smoking, used to raise a lot of tobacco   Substance and Sexual Activity    Alcohol use: Yes     Alcohol/week: 1.0 standard drinks     Types: 1 Shots of liquor per week     Comment: nightly    Drug use: No    Sexual activity: Yes     Partners: Female   Other Topics Concern    Not on file   Social History Narrative    Not on file     Social Determinants of Health     Financial Resource Strain: Low Risk     Difficulty of Paying Living Expenses: Not hard at all   Food Insecurity: No Food Insecurity    Worried About [address] in the Last Year: Never true    [address] [facility] [address] in the Last Year: Never true   Transportation Needs:     Lack of Transportation (Medical):      Lack of Transportation (Non-Medical):    Physical Activity:     Days of Exercise per Week:     Minutes of Exercise per Session:    Stress:     Feeling of Stress :    Social Connections:     Frequency of Communication with Friends and Family:     Frequency of Social Gatherings with Friends and Family:     Attends [employer] Services:     Active Member of Clubs or Organizations:     Attends Club or Organization Meetings:     Marital Status:    Intimate Partner Violence:     Fear of Current or Ex-Partner:     Emotionally Abused:     Physically Abused:     Sexually Abused:        Prior to Visit Medications    Medication Sig Taking? Authorizing Provider   zinc sulfate (ZINCATE) 220 (50 Zn) MG capsule Take 50 mg by mouth daily Yes Historical Provider, MD   NONFORMULARY Quercetin Yes Historical Provider, MD   hydroCHLOROthiazide (MICROZIDE) 12.5 MG capsule Take 1 capsule by mouth every morning Yes Janice Ramos MD   loratadine-pseudoephedrine (ALLERGY RELIEF D-24)  MG per extended release tablet TAKE ONE TABLET BY MOUTH DAILY Yes WILI Galarza CNP   donepezil (ARICEPT) 10 MG tablet Take 1 tablet by mouth nightly Yes WILI Galarza CNP   clindamycin (CLEOCIN-T) 1 % external solution Apply to new lesions bid. Yes Joe Mtz MD   diclofenac (VOLTAREN) 75 MG EC tablet Take 1 tablet by mouth 2 times daily Yes Victoria Wills DO   Ascorbic Acid (VITAMIN C) 1000 MG tablet Take 2,000 mg by mouth daily  Yes Historical Provider, MD   Red Yeast Rice 600 MG TABS Take 600 mg by mouth 2 times daily Yes Historical Provider, MD   Turmeric 500 MG TABS Take 500 mg by mouth 2 times daily Yes Historical Provider, MD   Cinnamon 500 MG CAPS Take 500 mg by mouth 2 times daily Yes Historical Provider, MD   Omega-3 Fatty Acids (FISH OIL) 1000 MG CAPS Take 1,000 mg by mouth 2 times daily  Yes Historical Provider, MD   POTASSIUM CHLORIDE PO Take by mouth daily 99 mg takes 1/2 tab unless having cramps then takes whole tab.  Yes Historical Provider, MD   fluticasone (FLONASE) 50 MCG/ACT nasal spray 2 sprays bilaterally qd Yes Janice Ramos MD   vitamin D 1000 units CAPS Take by mouth Yes Historical Provider, MD   Sennosides (SENNA LAXATIVE PO) Take by mouth Yes Historical Provider, MD   MAGNESIUM PO Take by mouth 2 times daily  Yes Historical Provider, MD   Calcium Carb-Cholecalciferol 600-500 MG-UNIT CAPS Take 1,000 Units by mouth daily Yes Trice Esteban MD   leuprolide (LUPRON) 11.25 MG injection Inject 11.25 mg into the muscle every 6 months  Yes Historical Provider, MD   Denosumab (PROLIA SC) Inject into the skin every 6 months  Patient not taking: Reported on 8/25/2021  Historical Provider, MD       Allergies   Allergen Reactions    Cephalexin Hives    Lisinopril      angio edema        OBJECTIVE:    /76   Pulse 74   Ht 5' 3\" (1.6 m)   Wt 171 lb 12.8 oz (77.9 kg)   SpO2 95%   BMI 30.43 kg/m²     BP Readings from Last 2 Encounters:   08/25/21 103/76   07/12/21 122/76       Wt Readings from Last 3 Encounters:   08/25/21 171 lb 12.8 oz (77.9 kg)   07/12/21 176 lb (79.8 kg)   06/28/21 176 lb (79.8 kg)       Physical Exam  Constitutional:       Appearance: Normal appearance. HENT:      Head: Normocephalic and atraumatic. Eyes:      Extraocular Movements: Extraocular movements intact. Cardiovascular:      Rate and Rhythm: Normal rate and regular rhythm. Pulmonary:      Effort: Pulmonary effort is normal.      Breath sounds: Normal breath sounds. Abdominal:      Palpations: Abdomen is soft. Tenderness: There is no abdominal tenderness. Musculoskeletal:      Right lower leg: No edema. Left lower leg: No edema. Skin:     General: Skin is warm and dry. Neurological:      General: No focal deficit present. Mental Status: He is alert and oriented to person, place, and time. Psychiatric:         Mood and Affect: Mood normal.         Behavior: Behavior normal.           ASSESSMENT/PLAN:    1. Essential hypertension  The current medical regimen is effective;  continue present plan and medications. - hydroCHLOROthiazide (MICROZIDE) 12.5 MG capsule; Take 1 capsule by mouth every morning  Dispense: 90 capsule; Refill: 3    2. Need for vaccination for pneumococcus    - PNEUMOVAX 23 subcutaneous/IM (Pneumococcal polysaccharide vaccine 23-valent >= 3yo)    3. Prostate cancer [facility])  F/u w/ urology as planned.       4. Primary osteoarthritis of both knees  Planning on knee replacements. 5. Memory disturbance  The current medical regimen is effective;  continue present plan and medications. 6. Hyperlipidemia, unspecified hyperlipidemia type  Improved hdl and ldl. Not interested in meds    7. Abnormal glucose  ha1c improved. Plans on seeing new derm soon.

## 2021-11-30 ENCOUNTER — HOSPITAL ENCOUNTER (OUTPATIENT)
Age: 66
Discharge: HOME | End: 2021-11-30
Payer: MEDICARE

## 2021-11-30 ENCOUNTER — OFFICE VISIT (OUTPATIENT)
Dept: FAMILY MEDICINE CLINIC | Age: 66
End: 2021-11-30
Payer: MEDICARE

## 2021-11-30 VITALS
BODY MASS INDEX: 31.18 KG/M2 | HEIGHT: 63 IN | OXYGEN SATURATION: 97 % | WEIGHT: 176 LBS | SYSTOLIC BLOOD PRESSURE: 132 MMHG | HEART RATE: 68 BPM | DIASTOLIC BLOOD PRESSURE: 82 MMHG

## 2021-11-30 DIAGNOSIS — I10 ESSENTIAL HYPERTENSION: ICD-10-CM

## 2021-11-30 DIAGNOSIS — M17.12 OSTEOARTHRITIS OF LEFT KNEE, UNSPECIFIED OSTEOARTHRITIS TYPE: ICD-10-CM

## 2021-11-30 DIAGNOSIS — H61.23 BILATERAL IMPACTED CERUMEN: ICD-10-CM

## 2021-11-30 DIAGNOSIS — Z01.818 PRE-OP EXAMINATION: Primary | ICD-10-CM

## 2021-11-30 LAB
ALBUMIN SERPL-MCNC: 4.4 G/DL (ref 3.4–5)
ANION GAP SERPL CALCULATED.3IONS-SCNC: 11 MMOL/L (ref 3–16)
APTT: 38.1 SEC (ref 26.2–38.6)
BACTERIA: ABNORMAL /HPF
BASOPHILS ABSOLUTE: 0.1 K/UL (ref 0–0.2)
BASOPHILS RELATIVE PERCENT: 1.2 %
BILIRUBIN URINE: NEGATIVE
BLOOD, URINE: NEGATIVE
BUN BLDV-MCNC: 33 MG/DL (ref 7–20)
CALCIUM SERPL-MCNC: 9.8 MG/DL (ref 8.3–10.6)
CHLORIDE BLD-SCNC: 104 MMOL/L (ref 99–110)
CLARITY: CLEAR
CO2: 27 MMOL/L (ref 21–32)
COLOR: YELLOW
CREAT SERPL-MCNC: 1.5 MG/DL (ref 0.8–1.3)
EOSINOPHILS ABSOLUTE: 0.5 K/UL (ref 0–0.6)
EOSINOPHILS RELATIVE PERCENT: 5 %
EPITHELIAL CELLS, UA: ABNORMAL /HPF (ref 0–5)
GFR AFRICAN AMERICAN: 56
GFR NON-AFRICAN AMERICAN: 46
GLUCOSE BLD-MCNC: 146 MG/DL (ref 70–99)
GLUCOSE URINE: NEGATIVE MG/DL
HCT VFR BLD CALC: 42.3 % (ref 40.5–52.5)
HEMOGLOBIN: 14.1 G/DL (ref 13.5–17.5)
INR BLD: 0.97 (ref 0.88–1.12)
KETONES, URINE: NEGATIVE MG/DL
LEUKOCYTE ESTERASE, URINE: ABNORMAL
LYMPHOCYTES ABSOLUTE: 2.5 K/UL (ref 1–5.1)
LYMPHOCYTES RELATIVE PERCENT: 25.7 %
MCH RBC QN AUTO: 31.5 PG (ref 26–34)
MCHC RBC AUTO-ENTMCNC: 33.3 G/DL (ref 31–36)
MCV RBC AUTO: 94.7 FL (ref 80–100)
MICROSCOPIC EXAMINATION: YES
MONOCYTES ABSOLUTE: 0.7 K/UL (ref 0–1.3)
MONOCYTES RELATIVE PERCENT: 7.4 %
NEUTROPHILS ABSOLUTE: 5.8 K/UL (ref 1.7–7.7)
NEUTROPHILS RELATIVE PERCENT: 60.7 %
NITRITE, URINE: POSITIVE
PDW BLD-RTO: 13.6 % (ref 12.4–15.4)
PH UA: 6 (ref 5–8)
PLATELET # BLD: 262 K/UL (ref 135–450)
PMV BLD AUTO: 7.3 FL (ref 5–10.5)
POTASSIUM SERPL-SCNC: 3.6 MMOL/L (ref 3.5–5.1)
PROTEIN UA: NEGATIVE MG/DL
PROTHROMBIN TIME: 11.1 SEC (ref 9.9–12.7)
RBC # BLD: 4.47 M/UL (ref 4.2–5.9)
RBC UA: ABNORMAL /HPF (ref 0–4)
SODIUM BLD-SCNC: 142 MMOL/L (ref 136–145)
SPECIFIC GRAVITY UA: 1.02 (ref 1–1.03)
URINE TYPE: ABNORMAL
UROBILINOGEN, URINE: 0.2 E.U./DL
WBC # BLD: 9.6 K/UL (ref 4–11)
WBC UA: ABNORMAL /HPF (ref 0–5)

## 2021-11-30 PROCEDURE — 93000 ELECTROCARDIOGRAM COMPLETE: CPT | Performed by: FAMILY MEDICINE

## 2021-11-30 PROCEDURE — 87077 CULTURE AEROBIC IDENTIFY: CPT

## 2021-11-30 PROCEDURE — 83036 HEMOGLOBIN GLYCOSYLATED A1C: CPT

## 2021-11-30 PROCEDURE — G8427 DOCREV CUR MEDS BY ELIG CLIN: HCPCS | Performed by: FAMILY MEDICINE

## 2021-11-30 PROCEDURE — 84403 ASSAY OF TOTAL TESTOSTERONE: CPT

## 2021-11-30 PROCEDURE — 85730 THROMBOPLASTIN TIME PARTIAL: CPT

## 2021-11-30 PROCEDURE — G8484 FLU IMMUNIZE NO ADMIN: HCPCS | Performed by: FAMILY MEDICINE

## 2021-11-30 PROCEDURE — 87086 URINE CULTURE/COLONY COUNT: CPT

## 2021-11-30 PROCEDURE — 3017F COLORECTAL CA SCREEN DOC REV: CPT | Performed by: FAMILY MEDICINE

## 2021-11-30 PROCEDURE — 4040F PNEUMOC VAC/ADMIN/RCVD: CPT | Performed by: FAMILY MEDICINE

## 2021-11-30 PROCEDURE — 87186 SC STD MICRODIL/AGAR DIL: CPT

## 2021-11-30 PROCEDURE — 85025 COMPLETE CBC W/AUTO DIFF WBC: CPT

## 2021-11-30 PROCEDURE — 80048 BASIC METABOLIC PNL TOTAL CA: CPT

## 2021-11-30 PROCEDURE — 84153 ASSAY OF PSA TOTAL: CPT

## 2021-11-30 PROCEDURE — 36415 COLL VENOUS BLD VENIPUNCTURE: CPT

## 2021-11-30 PROCEDURE — 81001 URINALYSIS AUTO W/SCOPE: CPT

## 2021-11-30 PROCEDURE — G8417 CALC BMI ABV UP PARAM F/U: HCPCS | Performed by: FAMILY MEDICINE

## 2021-11-30 PROCEDURE — 99214 OFFICE O/P EST MOD 30 MIN: CPT | Performed by: FAMILY MEDICINE

## 2021-11-30 PROCEDURE — 85610 PROTHROMBIN TIME: CPT

## 2021-11-30 PROCEDURE — 84466 ASSAY OF TRANSFERRIN: CPT

## 2021-11-30 PROCEDURE — 82040 ASSAY OF SERUM ALBUMIN: CPT

## 2021-11-30 PROCEDURE — 1123F ACP DISCUSS/DSCN MKR DOCD: CPT | Performed by: FAMILY MEDICINE

## 2021-11-30 PROCEDURE — 1036F TOBACCO NON-USER: CPT | Performed by: FAMILY MEDICINE

## 2021-11-30 PROCEDURE — 69210 REMOVE IMPACTED EAR WAX UNI: CPT | Performed by: FAMILY MEDICINE

## 2021-11-30 RX ORDER — SCOLOPAMINE TRANSDERMAL SYSTEM 1 MG/1
1 PATCH, EXTENDED RELEASE TRANSDERMAL ONCE
Qty: 3 PATCH | Refills: 0 | Status: SHIPPED | OUTPATIENT
Start: 2021-11-30 | End: 2021-11-30

## 2021-11-30 ASSESSMENT — ENCOUNTER SYMPTOMS: SHORTNESS OF BREATH: 0

## 2021-11-30 NOTE — PROGRESS NOTES
Broderick Amato  YOB: 1955    Date of Service:  11/30/2021      Wt Readings from Last 2 Encounters:   11/30/21 176 lb (79.8 kg)   08/25/21 171 lb 12.8 oz (77.9 kg)       BP Readings from Last 3 Encounters:   11/30/21 132/82   08/25/21 130/76   07/12/21 122/76        Chief Complaint   Patient presents with    Pre-op Exam     Patient is having left knee replacement by Dr. Henrik Guzman at [facility] on 12/7[facility]21. Allergies   Allergen Reactions    Cephalexin Hives    Lisinopril      angio edema        Outpatient Medications Marked as Taking for the 11/30/21 encounter (Office Visit) with Marta Castaneda MD   Medication Sig Dispense Refill    zinc sulfate (ZINCATE) 220 (50 Zn) MG capsule Take 50 mg by mouth daily      NONFORMULARY Quercetin      hydroCHLOROthiazide (MICROZIDE) 12.5 MG capsule Take 1 capsule by mouth every morning 90 capsule 3    loratadine-pseudoephedrine (ALLERGY RELIEF D-24)  MG per extended release tablet TAKE ONE TABLET BY MOUTH DAILY 90 tablet 3    donepezil (ARICEPT) 10 MG tablet Take 1 tablet by mouth nightly 90 tablet 3    clindamycin (CLEOCIN-T) 1 % external solution Apply to new lesions bid. 60 mL 2    diclofenac (VOLTAREN) 75 MG EC tablet Take 1 tablet by mouth 2 times daily 60 tablet 3    Ascorbic Acid (VITAMIN C) 1000 MG tablet Take 2,000 mg by mouth daily       Red Yeast Rice 600 MG TABS Take 600 mg by mouth 2 times daily      Turmeric 500 MG TABS Take 500 mg by mouth 2 times daily      Cinnamon 500 MG CAPS Take 500 mg by mouth 2 times daily      Omega-3 Fatty Acids (FISH OIL) 1000 MG CAPS Take 1,000 mg by mouth 2 times daily       POTASSIUM CHLORIDE PO Take by mouth daily 99 mg takes 1/2 tab unless having cramps then takes whole tab.       fluticasone (FLONASE) 50 MCG/ACT nasal spray 2 sprays bilaterally qd 3 Bottle 3    vitamin D 1000 units CAPS Take by mouth      Sennosides (SENNA LAXATIVE PO) Take by mouth      MAGNESIUM PO Take by mouth 2 times daily       Calcium Carb-Cholecalciferol 600-500 MG-UNIT CAPS Take 1,000 Units by mouth daily      leuprolide (LUPRON) 11.25 MG injection Inject 11.25 mg into the muscle every 6 months          This patient presents to the office today for a preoperative consultation at the request of surgeon, Dr. Pj Calvillo, who plans on performing left total knee replacement on December 12 at [facility]. The current problem began years ago, and symptoms have been worsening with time. Conservative therapy:   Planned anesthesia: General   Known anesthesia problems: slow to wake up.      Bleeding risk: No recent or remote history of abnormal bleeding  Personal or FH of DVT/PE: No    Patient objection to receiving blood products: No    Past Medical History:   Diagnosis Date    Actinic keratoses     Allergic rhinitis     Arthritis     Environmental allergies     Malignant melanoma of face excluding eyelid, nose, lip, and ear ([facility]     Prostate cancer ([facility]     PROSTATE       Past Surgical History:   Procedure Laterality Date    COLONOSCOPY  02/16/2016    EPIDURAL STEROID INJECTION N/A 02/26/2019    MIDLINE LUMBAR FIVE SACRAL ONE EPIDURAL STEROID INJECTION SITE CONFIRMED BY FLUOROSCOPY performed by Saskia Steen MD at [facility] N/A 07/30/2019    MIDLINE LUMBAR FIVE SACRAL ONE EPIDURAL STEROID INJECTION SITE CONFIRMED BY FLUOROSCOPY performed by Saskia Steen MD at [facility] N/A 10/22/2019    MIDLINE LUMBAR FIVE SACRAL ONE EPIDURAL STEROID INJECTION SITE CONFIRMED BY FLUOROSCOPY performed by Saskia Steen MD at [address] ARTHROSCOPY Left 04/26/2016    partial medial and lateral meniscectomy, chondroplasty    KNEE SURGERY Right 10/15/2013    RT KNEE PARTIAL MEDIAL, LATERAL MENISECTOMY AND CHONDROPLASTY WITH REMOVAL OF LOSSE HARDWARE    KNEE SURGERY Right 10/15/2013    RT KNEE ARTHROSCOPY PARTIAL LATERAL AND MEDIAL MENISECTOMY WITH CHONDROPLASTY AND REMOVAL OF LOOSE BODIES    MOHS SURGERY  03/01/2021    melanoma    NERVE SURGERY Bilateral 09/21/2020    BILATERAL LUMBAR THREE LUMBAR FOUR LUMBAR FIVE RADIOFREQUENCY ABLATION SITE CONFIRMED BY FLUOROSCOPY performed by Stanford Cisneros MD at [facility] N/A 05/18/2020    MIDLINE LUMBAR FIVE SACRAL ONE EPIDURAL STEROID INJECTION SITE CONFIRMED BY FLUOROSCOPY performed by Stanford Cisneros MD at [facility] Bilateral 08/24/2020    BILATERAL LUMBAR THREE, FOUR, FIVE MEDIAL BRANCH BLOCK SITE CONFIRMED BY FLUOROSCOPY performed by Stanford Cisneros MD at [facility] Bilateral 09/01/2020    BILATERAL LUMBAR THREE LUMBAR FOUR LUMBAR FIVE LUMBAR MEDIAL BRANCH BLOCK SITE CONFIRMED BY FLUOROSCOPY performed by Stanford Cisneros MD at [facility] Left 01/04/2021    LEFT LUMBAR FIVE SACRAL ONE EPIDURAL STEROID INJECTION SITE CONFIRMED BY FLUOROSCOPY performed by Stanford Cisneros MD at [facility]  07/17/2015    DaVinci prostatectomy    SHOULDER SURGERY         Family History   Problem Relation Age of Onset    Diabetes Mother     Heart Disease Mother 79        2 vessel bypass    High Blood Pressure Mother     Alzheimer's Disease Mother     Arthritis Father     Heart Disease Father 68        stents    High Blood Pressure Father     Stroke Father     Heart Disease Paternal Uncle     Alzheimer's Disease Maternal Grandmother     Cancer Paternal Grandfather     Arthritis Paternal Grandfather        Social History     Socioeconomic History    Marital status:      Spouse name: Not on file    Number of children: Not on file    Years of education: Not on file    Highest education level: Not on file   Occupational History    Not on file   Tobacco Use    Smoking status: Never Smoker    Smokeless tobacco: Never Used    Tobacco comment: no history smoking, used to raise a lot of tobacco   Substance and Sexual Activity    Alcohol use: Yes     Alcohol/week: 1.0 standard drink     Types: 1 Shots of liquor per week     Comment: nightly    Drug use: No    Sexual activity: Yes     Partners: Female   Other Topics Concern    Not on file   Social History Narrative    Not on file     Social Determinants of Health     Financial Resource Strain: Low Risk     Difficulty of Paying Living Expenses: Not hard at all   Food Insecurity: No Food Insecurity    Worried About [address] [facility] in the Last Year: Never true    [address] [facility] in the Last Year: Never true   Transportation Needs:     Lack of Transportation (Medical): Not on file    Lack of Transportation (Non-Medical): Not on file   Physical Activity:     Days of Exercise per Week: Not on file    Minutes of Exercise per Session: Not on file   Stress:     Feeling of Stress : Not on file   Social Connections:     Frequency of Communication with Friends and Family: Not on file    Frequency of Social Gatherings with Friends and Family: Not on file    Attends [employer] Services: Not on file    Active Member of [address] [place] [address] or Organizations: Not on file    Attends Club or Organization Meetings: Not on file    Marital Status: Not on file   Intimate Partner Violence:     Fear of Current or Ex-Partner: Not on file    Emotionally Abused: Not on file    Physically Abused: Not on file    Sexually Abused: Not on file   Housing Stability:     Unable to Pay for Housing in the Last Year: Not on file    Number of [place] in the Last Year: Not on file    Unstable Housing in the Last Year: Not on file       Review of Systems  Review of Systems   Constitutional: Negative for fever. Respiratory: Negative for shortness of breath. Cardiovascular: Negative for chest pain and palpitations. Musculoskeletal: Positive for arthralgias.        Vitals:    11/30/21 0750   BP: 132/82   Pulse: 68   SpO2: 97%   Weight: 176 lb (79.8 kg)   Height: 5' 3\" (1.6 m)        Physical Exam   Physical Exam  Constitutional:       Appearance: Normal appearance. HENT:      Head: Normocephalic and atraumatic. Eyes:      Extraocular Movements: Extraocular movements intact. Neck:      Vascular: No carotid bruit. Cardiovascular:      Rate and Rhythm: Normal rate and regular rhythm. Pulmonary:      Effort: Pulmonary effort is normal.      Breath sounds: Normal breath sounds. Abdominal:      Palpations: Abdomen is soft. Tenderness: There is no abdominal tenderness. Musculoskeletal:      Right lower leg: No edema. Left lower leg: No edema. Skin:     General: Skin is warm and dry. Neurological:      General: No focal deficit present. Mental Status: He is alert and oriented to person, place, and time. Psychiatric:         Mood and Affect: Mood normal.         Behavior: Behavior normal.         EKG Interpretation:  normal EKG, normal sinus rhythm, unchanged from previous tracings. Lab Review not applicable        Assessment:       74 y.o. patient with planned surgery as above. Known risk factors for perioperative complications: Hypertension, hx of prostate cancer. Current medications which may produce withdrawal symptoms if withheld perioperatively: none   1. Pre-op examination  Has order for labs. Plans to go get them done.  ekg neg. Asked about med for nausea. rx for scopolamine provided, but rec d/w anesthesia prior to using.    - EKG 12 lead    2. Osteoarthritis of left knee, unspecified osteoarthritis type  Ok for planned procedure. 3. Essential hypertension    - EKG 12 lead    4. Cerumen impaction bilat. Manually removed w/ lighted curette. jenny well. Plan:     1. Preoperative workup as follows:ECG, hemoglobin, hematocrit, electrolytes, creatinine, coagulation studies  2.  Change in medication regimen before surgery: Discontinue NSAIDs (diclofenac) 7 days before surgery  3. Prophylaxis forcardiac events with perioperative beta-blockers: Not indicated  ACC/AHA indications for pre-operative beta-blocker use:    · Vascular surgery with history of postitive stress test  · Intermediate or high risk surgery with history of CAD   · Intermediate or high risk surgery with multiple clinical predictors of CAD- 2 of the following: history of compensated or prior heart failure, history of cerebrovascular disease, DM, or renal insufficiency    Routine administration of higher-dose, long-acting metoprolol in beta-blocker-naïve patients on the day of surgery, and in the absence of dose titration is with an overall increase in mortality. Beta-blockers should be started days to weeks prior to surgery and titrated to pulse < 70.  4. Deep vein thrombosis prophylaxis: regimen to be chosen by surgical team  5. No contraindications to planned surgery        Ramón Darling.  Nigel Tijerina MD

## 2021-12-01 LAB
ESTIMATED AVERAGE GLUCOSE: 119.8 MG/DL
HBA1C MFR BLD: 5.8 %
PROSTATE SPECIFIC ANTIGEN: <0.01 NG/ML (ref 0–4)
TRANSFERRIN: 224 MG/DL (ref 200–360)

## 2021-12-02 LAB
ORGANISM: ABNORMAL
URINE CULTURE, ROUTINE: ABNORMAL

## 2021-12-03 LAB — TESTOSTERONE TOTAL: <3 NG/DL (ref 220–1000)

## 2021-12-06 ENCOUNTER — HOSPITAL ENCOUNTER (OUTPATIENT)
Dept: GENERAL RADIOLOGY | Age: 66
Discharge: HOME | End: 2021-12-06
Payer: MEDICARE

## 2021-12-06 DIAGNOSIS — M81.0 SENILE OSTEOPOROSIS: ICD-10-CM

## 2021-12-06 PROCEDURE — 77080 DXA BONE DENSITY AXIAL: CPT

## 2022-02-01 ENCOUNTER — HOSPITAL ENCOUNTER (OUTPATIENT)
Age: 67
Discharge: HOME | End: 2022-02-01
Payer: MEDICARE

## 2022-02-01 LAB
ALBUMIN SERPL-MCNC: 4.5 G/DL (ref 3.4–5)
ANION GAP SERPL CALCULATED.3IONS-SCNC: 13 MMOL/L (ref 3–16)
APTT: 38.2 SEC (ref 26.2–38.6)
BACTERIA: ABNORMAL /HPF
BASOPHILS ABSOLUTE: 0.1 K/UL (ref 0–0.2)
BASOPHILS RELATIVE PERCENT: 0.9 %
BILIRUBIN URINE: NEGATIVE
BLOOD, URINE: NEGATIVE
BUN BLDV-MCNC: 25 MG/DL (ref 7–20)
CALCIUM SERPL-MCNC: 9.5 MG/DL (ref 8.3–10.6)
CHLORIDE BLD-SCNC: 102 MMOL/L (ref 99–110)
CLARITY: CLEAR
CO2: 25 MMOL/L (ref 21–32)
COLOR: YELLOW
CREAT SERPL-MCNC: 1 MG/DL (ref 0.8–1.3)
EOSINOPHILS ABSOLUTE: 0.5 K/UL (ref 0–0.6)
EOSINOPHILS RELATIVE PERCENT: 6 %
EPITHELIAL CELLS, UA: ABNORMAL /HPF (ref 0–5)
GFR AFRICAN AMERICAN: >60
GFR NON-AFRICAN AMERICAN: >60
GLUCOSE BLD-MCNC: 144 MG/DL (ref 70–99)
GLUCOSE URINE: NEGATIVE MG/DL
HCT VFR BLD CALC: 42.2 % (ref 40.5–52.5)
HEMOGLOBIN: 14.5 G/DL (ref 13.5–17.5)
INR BLD: 0.93 (ref 0.88–1.12)
KETONES, URINE: NEGATIVE MG/DL
LEUKOCYTE ESTERASE, URINE: ABNORMAL
LYMPHOCYTES ABSOLUTE: 2.8 K/UL (ref 1–5.1)
LYMPHOCYTES RELATIVE PERCENT: 29.9 %
MCH RBC QN AUTO: 31.2 PG (ref 26–34)
MCHC RBC AUTO-ENTMCNC: 34.4 G/DL (ref 31–36)
MCV RBC AUTO: 90.6 FL (ref 80–100)
MICROSCOPIC EXAMINATION: YES
MONOCYTES ABSOLUTE: 0.6 K/UL (ref 0–1.3)
MONOCYTES RELATIVE PERCENT: 7 %
NEUTROPHILS ABSOLUTE: 5.2 K/UL (ref 1.7–7.7)
NEUTROPHILS RELATIVE PERCENT: 56.2 %
NITRITE, URINE: NEGATIVE
PDW BLD-RTO: 14 % (ref 12.4–15.4)
PH UA: 5.5 (ref 5–8)
PLATELET # BLD: 239 K/UL (ref 135–450)
PMV BLD AUTO: 6.8 FL (ref 5–10.5)
POTASSIUM SERPL-SCNC: 3.7 MMOL/L (ref 3.5–5.1)
PROTEIN UA: NEGATIVE MG/DL
PROTHROMBIN TIME: 10.6 SEC (ref 9.9–12.7)
RBC # BLD: 4.65 M/UL (ref 4.2–5.9)
RBC UA: ABNORMAL /HPF (ref 0–4)
SODIUM BLD-SCNC: 140 MMOL/L (ref 136–145)
SPECIFIC GRAVITY UA: <=1.005 (ref 1–1.03)
URINE TYPE: ABNORMAL
UROBILINOGEN, URINE: 0.2 E.U./DL
WBC # BLD: 9.2 K/UL (ref 4–11)
WBC UA: ABNORMAL /HPF (ref 0–5)

## 2022-02-01 PROCEDURE — 87077 CULTURE AEROBIC IDENTIFY: CPT

## 2022-02-01 PROCEDURE — 84466 ASSAY OF TRANSFERRIN: CPT

## 2022-02-01 PROCEDURE — 83036 HEMOGLOBIN GLYCOSYLATED A1C: CPT

## 2022-02-01 PROCEDURE — 87086 URINE CULTURE/COLONY COUNT: CPT

## 2022-02-01 PROCEDURE — 80048 BASIC METABOLIC PNL TOTAL CA: CPT

## 2022-02-01 PROCEDURE — 85025 COMPLETE CBC W/AUTO DIFF WBC: CPT

## 2022-02-01 PROCEDURE — 82040 ASSAY OF SERUM ALBUMIN: CPT

## 2022-02-01 PROCEDURE — 85610 PROTHROMBIN TIME: CPT

## 2022-02-01 PROCEDURE — 87186 SC STD MICRODIL/AGAR DIL: CPT

## 2022-02-01 PROCEDURE — 81001 URINALYSIS AUTO W/SCOPE: CPT

## 2022-02-01 PROCEDURE — 36415 COLL VENOUS BLD VENIPUNCTURE: CPT

## 2022-02-01 PROCEDURE — 85730 THROMBOPLASTIN TIME PARTIAL: CPT

## 2022-02-02 ENCOUNTER — OFFICE VISIT (OUTPATIENT)
Dept: FAMILY MEDICINE CLINIC | Age: 67
End: 2022-02-02
Payer: MEDICARE

## 2022-02-02 VITALS
WEIGHT: 179 LBS | HEIGHT: 63 IN | BODY MASS INDEX: 31.71 KG/M2 | SYSTOLIC BLOOD PRESSURE: 132 MMHG | DIASTOLIC BLOOD PRESSURE: 76 MMHG | HEART RATE: 76 BPM | OXYGEN SATURATION: 98 %

## 2022-02-02 DIAGNOSIS — M17.11 OSTEOARTHRITIS OF RIGHT KNEE, UNSPECIFIED OSTEOARTHRITIS TYPE: ICD-10-CM

## 2022-02-02 DIAGNOSIS — I10 ESSENTIAL HYPERTENSION: ICD-10-CM

## 2022-02-02 DIAGNOSIS — C61 PROSTATE CANCER (HCC): ICD-10-CM

## 2022-02-02 DIAGNOSIS — Z01.818 PREOP EXAMINATION: Primary | ICD-10-CM

## 2022-02-02 LAB
ESTIMATED AVERAGE GLUCOSE: 114 MG/DL
HBA1C MFR BLD: 5.6 %
TRANSFERRIN: 241 MG/DL (ref 200–360)

## 2022-02-02 PROCEDURE — G8427 DOCREV CUR MEDS BY ELIG CLIN: HCPCS | Performed by: FAMILY MEDICINE

## 2022-02-02 PROCEDURE — 4040F PNEUMOC VAC/ADMIN/RCVD: CPT | Performed by: FAMILY MEDICINE

## 2022-02-02 PROCEDURE — 1036F TOBACCO NON-USER: CPT | Performed by: FAMILY MEDICINE

## 2022-02-02 PROCEDURE — G8484 FLU IMMUNIZE NO ADMIN: HCPCS | Performed by: FAMILY MEDICINE

## 2022-02-02 PROCEDURE — 3017F COLORECTAL CA SCREEN DOC REV: CPT | Performed by: FAMILY MEDICINE

## 2022-02-02 PROCEDURE — 1123F ACP DISCUSS/DSCN MKR DOCD: CPT | Performed by: FAMILY MEDICINE

## 2022-02-02 PROCEDURE — 99214 OFFICE O/P EST MOD 30 MIN: CPT | Performed by: FAMILY MEDICINE

## 2022-02-02 PROCEDURE — 3288F FALL RISK ASSESSMENT DOCD: CPT | Performed by: FAMILY MEDICINE

## 2022-02-02 PROCEDURE — G8417 CALC BMI ABV UP PARAM F/U: HCPCS | Performed by: FAMILY MEDICINE

## 2022-02-02 ASSESSMENT — PATIENT HEALTH QUESTIONNAIRE - PHQ9
SUM OF ALL RESPONSES TO PHQ QUESTIONS 1-9: 0
1. LITTLE INTEREST OR PLEASURE IN DOING THINGS: 0
SUM OF ALL RESPONSES TO PHQ9 QUESTIONS 1 & 2: 0
SUM OF ALL RESPONSES TO PHQ QUESTIONS 1-9: 0
2. FEELING DOWN, DEPRESSED OR HOPELESS: 0

## 2022-02-02 ASSESSMENT — ENCOUNTER SYMPTOMS
CHEST TIGHTNESS: 0
BLOOD IN STOOL: 0
DIARRHEA: 0
CONSTIPATION: 0
SHORTNESS OF BREATH: 0

## 2022-02-02 NOTE — PROGRESS NOTES
Mariano Whelan  YOB: 1955    Date of Service:  2/2/2022      Wt Readings from Last 2 Encounters:   02/02/22 179 lb (81.2 kg)   11/30/21 176 lb (79.8 kg)       BP Readings from Last 3 Encounters:   02/02/22 132/76   11/30/21 132/82   08/25/21 130/76        Chief Complaint   Patient presents with   Lucio Pearce Pre-op Exam     Right Knee Replacement/2-15-22/Dr. Green/[facility]       Allergies   Allergen Reactions    Cephalexin Hives    Lisinopril      angio edema        Outpatient Medications Marked as Taking for the 2/2/22 encounter (Office Visit) with Radha Linder MD   Medication Sig Dispense Refill    zinc sulfate (ZINCATE) 220 (50 Zn) MG capsule Take 50 mg by mouth daily      NONFORMULARY Quercetin      hydroCHLOROthiazide (MICROZIDE) 12.5 MG capsule Take 1 capsule by mouth every morning 90 capsule 3    loratadine-pseudoephedrine (ALLERGY RELIEF D-24)  MG per extended release tablet TAKE ONE TABLET BY MOUTH DAILY 90 tablet 3    donepezil (ARICEPT) 10 MG tablet Take 1 tablet by mouth nightly 90 tablet 3    clindamycin (CLEOCIN-T) 1 % external solution Apply to new lesions bid. 60 mL 2    diclofenac (VOLTAREN) 75 MG EC tablet Take 1 tablet by mouth 2 times daily 60 tablet 3    Ascorbic Acid (VITAMIN C) 1000 MG tablet Take 2,000 mg by mouth daily       Denosumab (PROLIA SC) Inject into the skin every 6 months       Red Yeast Rice 600 MG TABS Take 600 mg by mouth 2 times daily      Turmeric 500 MG TABS Take 500 mg by mouth 2 times daily      Cinnamon 500 MG CAPS Take 500 mg by mouth 2 times daily      Omega-3 Fatty Acids (FISH OIL) 1000 MG CAPS Take 1,000 mg by mouth 2 times daily       POTASSIUM CHLORIDE PO Take by mouth daily 99 mg takes 1/2 tab unless having cramps then takes whole tab.       fluticasone (FLONASE) 50 MCG/ACT nasal spray 2 sprays bilaterally qd 3 Bottle 3    vitamin D 1000 units CAPS Take by mouth      Sennosides (SENNA LAXATIVE PO) Take by mouth  MAGNESIUM PO Take by mouth 2 times daily       Calcium Carb-Cholecalciferol 600-500 MG-UNIT CAPS Take 1,000 Units by mouth daily      leuprolide (LUPRON) 11.25 MG injection Inject 11.25 mg into the muscle every 6 months          This patient presents to the office today for a preoperative consultation at the request of surgeon, Dr. Terra Santamaria, who plans on performing Right Knee Replacement on February 15 at [facility]. The current problem began 2 years ago, and symptoms have been worsening with time.   Conservative therapy: No.    Planned anesthesia: General   Known anesthesia problems: Nausea  Bleeding risk: No recent or remote history of abnormal bleeding  Personal or FH of DVT/PE: No    Patient objection to receiving blood products: No    Past Medical History:   Diagnosis Date    Actinic keratoses     Allergic rhinitis     Arthritis     Environmental allergies     Malignant melanoma of face excluding eyelid, nose, lip, and ear ([facility] [address]     Prostate cancer ([facility] [address]     PROSTATE       Past Surgical History:   Procedure Laterality Date    COLONOSCOPY  02/16/2016    EPIDURAL STEROID INJECTION N/A 02/26/2019    MIDLINE LUMBAR FIVE SACRAL ONE EPIDURAL STEROID INJECTION SITE CONFIRMED BY FLUOROSCOPY performed by Elizabeth Winston MD at [facility] N/A 07/30/2019    MIDLINE LUMBAR FIVE SACRAL ONE EPIDURAL STEROID INJECTION SITE CONFIRMED BY FLUOROSCOPY performed by Elizabeth Winston MD at [facility] N/A 10/22/2019    MIDLINE LUMBAR FIVE SACRAL ONE EPIDURAL STEROID INJECTION SITE CONFIRMED BY FLUOROSCOPY performed by Elizabeth Winston MD at [address] ARTHROSCOPY Left 04/26/2016    partial medial and lateral meniscectomy, chondroplasty    KNEE SURGERY Right 10/15/2013    RT KNEE PARTIAL MEDIAL, LATERAL MENISECTOMY AND CHONDROPLASTY WITH REMOVAL OF LOSSE HARDWARE    KNEE SURGERY Right 10/15/2013    RT KNEE ARTHROSCOPY PARTIAL LATERAL AND MEDIAL MENISECTOMY WITH CHONDROPLASTY AND REMOVAL OF LOOSE BODIES    MOHS SURGERY  03/01/2021    melanoma    NERVE SURGERY Bilateral 09/21/2020    BILATERAL LUMBAR THREE LUMBAR FOUR LUMBAR FIVE RADIOFREQUENCY ABLATION SITE CONFIRMED BY FLUOROSCOPY performed by Maribell Dela Cruz MD at [facility] N/A 05/18/2020    MIDLINE LUMBAR FIVE SACRAL ONE EPIDURAL STEROID INJECTION SITE CONFIRMED BY FLUOROSCOPY performed by Maribell Dela Cruz MD at [address] [place] [address] Bilateral 08/24/2020    BILATERAL LUMBAR THREE, FOUR, FIVE MEDIAL BRANCH BLOCK SITE CONFIRMED BY FLUOROSCOPY performed by Maribell Dela Cruz MD at [address] [place] [address] Bilateral 09/01/2020    BILATERAL LUMBAR THREE LUMBAR FOUR LUMBAR FIVE LUMBAR MEDIAL BRANCH BLOCK SITE CONFIRMED BY FLUOROSCOPY performed by Maribell Dela Cruz MD at [facility] Left 01/04/2021    LEFT LUMBAR FIVE SACRAL ONE EPIDURAL STEROID INJECTION SITE CONFIRMED BY FLUOROSCOPY performed by Maribell Dela Cruz MD at [facility]  07/17/2015    DaVinci prostatectomy    SHOULDER SURGERY         Family History   Problem Relation Age of Onset    Diabetes Mother     Heart Disease Mother 79        2 vessel bypass    High Blood Pressure Mother     Alzheimer's Disease Mother     Arthritis Father     Heart Disease Father 68        stents    High Blood Pressure Father     Stroke Father     Heart Disease Paternal Uncle     Alzheimer's Disease Maternal Grandmother     Cancer Paternal Grandfather     Arthritis Paternal Grandfather        Social History     Socioeconomic History    Marital status:      Spouse name: Not on file    Number of children: Not on file    Years of education: Not on file    Highest education level: Not on file   Occupational History    Not on file   Tobacco Use    Smoking status: Never Smoker    Smokeless tobacco: Never Used    Tobacco comment: no history smoking, used to raise a lot of tobacco   Substance and Sexual Activity    Alcohol use: Yes     Alcohol/week: 1.0 standard drink     Types: 1 Shots of liquor per week     Comment: nightly    Drug use: No    Sexual activity: Yes     Partners: Female   Other Topics Concern    Not on file   Social History Narrative    Not on file     Social Determinants of Health     Financial Resource Strain: Low Risk     Difficulty of Paying Living Expenses: Not hard at all   Food Insecurity: No Food Insecurity    Worried About [facility] in the Last Year: Never true    [facility] in the Last Year: Never true   Transportation Needs:     Lack of Transportation (Medical): Not on file    Lack of Transportation (Non-Medical): Not on file   Physical Activity:     Days of Exercise per Week: Not on file    Minutes of Exercise per Session: Not on file   Stress:     Feeling of Stress : Not on file   Social Connections:     Frequency of Communication with Friends and Family: Not on file    Frequency of Social Gatherings with Friends and Family: Not on file    Attends Mandaeism Services: Not on file    Active Member of [address] [place] [address] or Organizations: Not on file    Attends Club or Organization Meetings: Not on file    Marital Status: Not on file   Intimate Partner Violence:     Fear of Current or Ex-Partner: Not on file    Emotionally Abused: Not on file    Physically Abused: Not on file    Sexually Abused: Not on file   Housing Stability:     Unable to Pay for Housing in the Last Year: Not on file    Number of [place] in the Last Year: Not on file    Unstable Housing in the Last Year: Not on file       Review of Systems  Review of Systems   Constitutional: Negative for chills, fatigue and fever. Respiratory: Negative for chest tightness and shortness of breath.     Cardiovascular: Negative for chest pain and palpitations. Gastrointestinal: Negative for blood in stool, constipation and diarrhea. Musculoskeletal: Positive for arthralgias. Neurological: Negative for dizziness, light-headedness and headaches. Vitals:    02/02/22 0830   BP: 132/76   Pulse: 76   SpO2: 98%   Weight: 179 lb (81.2 kg)   Height: 5' 3\" (1.6 m)        Physical Exam   Physical Exam  Constitutional:       Appearance: Normal appearance. HENT:      Head: Normocephalic and atraumatic. Eyes:      Extraocular Movements: Extraocular movements intact. Neck:      Vascular: No carotid bruit. Cardiovascular:      Rate and Rhythm: Normal rate and regular rhythm. Pulmonary:      Effort: Pulmonary effort is normal.      Breath sounds: Normal breath sounds. Abdominal:      Palpations: Abdomen is soft. Tenderness: There is no abdominal tenderness. Musculoskeletal:      Right lower leg: No edema. Left lower leg: No edema. Skin:     General: Skin is warm and dry. Neurological:      General: No focal deficit present. Mental Status: He is alert and oriented to person, place, and time. Psychiatric:         Mood and Affect: Mood normal.         Behavior: Behavior normal.         EKG Interpretation:  normal EKG, normal sinus rhythm, unchanged from previous tracings.   Last ekg done 11/30/21    Lab Review   Hospital Outpatient Visit on 02/01/2022   Component Date Value    Color, UA 02/01/2022 Yellow     Clarity, UA 02/01/2022 Clear     Glucose, Ur 02/01/2022 Negative     Bilirubin Urine 02/01/2022 Negative     Ketones, Urine 02/01/2022 Negative     Specific Gravity, UA 02/01/2022 <=1.005     Blood, Urine 02/01/2022 Negative     pH, UA 02/01/2022 5.5     Protein, UA 02/01/2022 Negative     Urobilinogen, Urine 02/01/2022 0.2     Nitrite, Urine 02/01/2022 Negative     Leukocyte Esterase, Urine 02/01/2022 MODERATE*    Microscopic Examination 02/01/2022 YES     Urine Type 02/01/2022 NotGiven     WBC 02/01/2022 9.2  RBC 02/01/2022 4.65     Hemoglobin 02/01/2022 14.5     Hematocrit 02/01/2022 42.2     MCV 02/01/2022 90.6     MCH 02/01/2022 31.2     MCHC 02/01/2022 34.4     RDW 02/01/2022 14.0     Platelets 60/68/3419 239     MPV 02/01/2022 6.8     Neutrophils % 02/01/2022 56.2     Lymphocytes % 02/01/2022 29.9     Monocytes % 02/01/2022 7.0     Eosinophils % 02/01/2022 6.0     Basophils % 02/01/2022 0.9     Neutrophils Absolute 02/01/2022 5.2     Lymphocytes Absolute 02/01/2022 2.8     Monocytes Absolute 02/01/2022 0.6     Eosinophils Absolute 02/01/2022 0.5     Basophils Absolute 02/01/2022 0.1     Protime 02/01/2022 10.6     INR 02/01/2022 0.93     aPTT 02/01/2022 38.2     Transferrin 02/01/2022 241.0     Albumin 02/01/2022 4.5     Sodium 02/01/2022 140     Potassium 02/01/2022 3.7     Chloride 02/01/2022 102     CO2 02/01/2022 25     Anion Gap 02/01/2022 13     Glucose 02/01/2022 144*    BUN 02/01/2022 25*    CREATININE 02/01/2022 1.0     GFR Non- 02/01/2022 >60     GFR  02/01/2022 >60     Calcium 02/01/2022 9.5     Hemoglobin A1C 02/01/2022 5.6     eAG 02/01/2022 114.0     WBC, UA 02/01/2022 10-20*    RBC, UA 02/01/2022 0-2     Epithelial Cells, UA 02/01/2022 0-1     Bacteria, UA 02/01/2022 Rare*           Assessment:       74 y.o. patient with planned surgery as above. Known risk factors for perioperative complications: hx prostate cancer. Current medications which may produce withdrawal symptoms if withheld perioperatively: none   1. Preop examination  Did well w/ left TKR. Reviewed labs and prior ekg.  ua w/ mild abn. O/w labs ok. Urine cx pending. May need abx prior to surgery. 2. Osteoarthritis of right knee, unspecified osteoarthritis type  Ok to proceed. See above. 3. Prostate cancer ([facility] [address]  No new issues. F/u w/ urology as sched.       4. Essential hypertension  The current medical regimen is effective;  continue present plan and medications. Plan:     1. Preoperative workup as follows:none  2. Change in medication regimen before surgery: Discontinue NSAIDs (voltaren) 5 days before surgery  3. Prophylaxis forcardiac events with perioperative beta-blockers: Not indicated  ACC/AHA indications for pre-operative beta-blocker use:    · Vascular surgery with history of postitive stress test  · Intermediate or high risk surgery with history of CAD   · Intermediate or high risk surgery with multiple clinical predictors of CAD- 2 of the following: history of compensated or prior heart failure, history of cerebrovascular disease, DM, or renal insufficiency    Routine administration of higher-dose, long-acting metoprolol in beta-blocker-naïve patients on the day of surgery, and in the absence of dose titration is with an overall increase in mortality. Beta-blockers should be started days to weeks prior to surgery and titrated to pulse < 70.  4. Deep vein thrombosis prophylaxis: regimen to be chosen by surgical team  5. No contraindications to planned surgery    Urine cx pending. May need abx prior to surgery. O/w ok to proceed. Regi Almaraz.  Adeola Godwin MD

## 2022-02-03 LAB
ORGANISM: ABNORMAL
URINE CULTURE, ROUTINE: ABNORMAL

## 2022-04-18 ENCOUNTER — OFFICE VISIT (OUTPATIENT)
Dept: FAMILY MEDICINE CLINIC | Age: 67
End: 2022-04-18
Payer: MEDICARE

## 2022-04-18 VITALS
HEART RATE: 64 BPM | HEIGHT: 63 IN | DIASTOLIC BLOOD PRESSURE: 86 MMHG | OXYGEN SATURATION: 98 % | BODY MASS INDEX: 32 KG/M2 | WEIGHT: 180.6 LBS | SYSTOLIC BLOOD PRESSURE: 134 MMHG

## 2022-04-18 DIAGNOSIS — Z00.00 MEDICARE ANNUAL WELLNESS VISIT, SUBSEQUENT: Primary | ICD-10-CM

## 2022-04-18 DIAGNOSIS — M25.50 PAIN IN JOINT INVOLVING MULTIPLE SITES: ICD-10-CM

## 2022-04-18 DIAGNOSIS — R73.09 ABNORMAL GLUCOSE: ICD-10-CM

## 2022-04-18 DIAGNOSIS — I10 ESSENTIAL HYPERTENSION: ICD-10-CM

## 2022-04-18 DIAGNOSIS — E78.5 HYPERLIPIDEMIA, UNSPECIFIED HYPERLIPIDEMIA TYPE: ICD-10-CM

## 2022-04-18 PROCEDURE — G0439 PPPS, SUBSEQ VISIT: HCPCS | Performed by: FAMILY MEDICINE

## 2022-04-18 PROCEDURE — 3017F COLORECTAL CA SCREEN DOC REV: CPT | Performed by: FAMILY MEDICINE

## 2022-04-18 PROCEDURE — 99213 OFFICE O/P EST LOW 20 MIN: CPT | Performed by: FAMILY MEDICINE

## 2022-04-18 PROCEDURE — G8427 DOCREV CUR MEDS BY ELIG CLIN: HCPCS | Performed by: FAMILY MEDICINE

## 2022-04-18 PROCEDURE — 1123F ACP DISCUSS/DSCN MKR DOCD: CPT | Performed by: FAMILY MEDICINE

## 2022-04-18 PROCEDURE — 1036F TOBACCO NON-USER: CPT | Performed by: FAMILY MEDICINE

## 2022-04-18 PROCEDURE — 4040F PNEUMOC VAC/ADMIN/RCVD: CPT | Performed by: FAMILY MEDICINE

## 2022-04-18 PROCEDURE — G8417 CALC BMI ABV UP PARAM F/U: HCPCS | Performed by: FAMILY MEDICINE

## 2022-04-18 ASSESSMENT — PATIENT HEALTH QUESTIONNAIRE - PHQ9
SUM OF ALL RESPONSES TO PHQ9 QUESTIONS 1 & 2: 0
SUM OF ALL RESPONSES TO PHQ QUESTIONS 1-9: 0
1. LITTLE INTEREST OR PLEASURE IN DOING THINGS: 0
2. FEELING DOWN, DEPRESSED OR HOPELESS: 0
SUM OF ALL RESPONSES TO PHQ QUESTIONS 1-9: 0

## 2022-04-18 ASSESSMENT — LIFESTYLE VARIABLES
HOW OFTEN DO YOU HAVE A DRINK CONTAINING ALCOHOL: 4 OR MORE TIMES A WEEK
HOW MANY STANDARD DRINKS CONTAINING ALCOHOL DO YOU HAVE ON A TYPICAL DAY: 1 OR 2

## 2022-04-18 NOTE — PROGRESS NOTES
Medicare Annual Wellness Visit    Micheline Hammer is here for Medicare AWV    Assessment & Plan   Medicare annual wellness visit, subsequent  Overall doing well. Prior knee replacement. Pain in joint involving multiple sites  -     CBC with Auto Differential; Future  Still using diclofenac. Discussed minimize nsaid use as much as possible. Alternate w/ tylenol. Had been on mobic in the past.     Essential hypertension  The current medical regimen is effective;  continue present plan and medications. Hyperlipidemia, unspecified hyperlipidemia type  -     Lipid Panel; Future  -     Comprehensive Metabolic Panel; Future  Rpt labs w/ next draw  Abnormal glucose  -     Hemoglobin A1C; Future  Rpt labs this summer w/ next draw. Recommendations for Preventive Services Due: see orders and patient instructions/AVS.  Recommended screening schedule for the next 5-10 years is provided to the patient in written form: see Patient Instructions/AVS.     Return for Medicare Annual Wellness Visit in 1 year. Subjective   The following acute and/or chronic problems were also addressed today:  Htn. Well controlled on hctz.  jenny meds. No other new cp or sob. Memory overall near baseline. No sig changes. jenny meds. Still some joint pain. Knees doing better. Has been on diclofenac for some time. Seems to help other joints (hands, others). mobic prior to that. Patient's complete Health Risk Assessment and screening values have been reviewed and are found in Flowsheets. The following problems were reviewed today and where indicated follow up appointments were made and/or referrals ordered. Positive Risk Factor Screenings with Interventions:    Fall Risk:  Do you feel unsteady or are you worried about falling? : no  2 or more falls in past year?: (!) yes  Fall with injury in past year?: no     Fall Risk Interventions:    · prior to knee surgery, in the woods. o/w doing well.         Alcohol Screening:       A score of 8 or more is associated with harmful or hazardous drinking. A score of 13 or more in women, and 15 or more in men, is likely to indicate alcohol dependence. Substance Abuse - Alcohol Interventions:  one shot per evening. helps sleep         Health Habits/Nutrition:     Physical Activity: Insufficiently Active    Days of Exercise per Week: 4 days    Minutes of Exercise per Session: 20 min     Have you lost any weight without trying in the past 3 months?: No  Body mass index: (!) 31.99  Have you seen the dentist within the past year?: (!) No    Health Habits/Nutrition Interventions:  · cont to work on activity. working on weight loss. Objective   Vitals:    04/18/22 0747   BP: 134/86   Pulse: 64   SpO2: 98%   Weight: 180 lb 9.6 oz (81.9 kg)   Height: 5' 3\" (1.6 m)      Body mass index is 31.99 kg/m². General Appearance: alert and oriented to person, place and time  Pulmonary/Chest: clear to auscultation bilaterally- no wheezes, rales or rhonchi, normal air movement, no respiratory distress  Cardiovascular: normal rate and normal S1 and S2  Abdomen: soft, non-tender  Extremities: no edema        Allergies   Allergen Reactions    Cephalexin Hives    Lisinopril      angio edema      Prior to Visit Medications    Medication Sig Taking?  Authorizing Provider   Niacinamide-Zn-Cu-[id number]-Pb (NICOTINAMIDE PO) Take by mouth daily Yes Historical Provider, MD   zinc sulfate (ZINCATE) 220 (50 Zn) MG capsule Take 50 mg by mouth daily Yes Historical Provider, MD   hydroCHLOROthiazide (MICROZIDE) 12.5 MG capsule Take 1 capsule by mouth every morning Yes Mary Zhu MD   loratadine-pseudoephedrine (ALLERGY RELIEF D-24)  MG per extended release tablet TAKE ONE TABLET BY MOUTH DAILY Yes WILI Bolton CNP   donepezil (ARICEPT) 10 MG tablet Take 1 tablet by mouth nightly Yes WILI Bolton CNP   diclofenac (VOLTAREN) 75 MG EC tablet Take 1 tablet by mouth 2 times daily Yes Victoria Wills DO   Ascorbic Acid (VITAMIN C) 1000 MG tablet Take 2,000 mg by mouth daily  Yes Historical Provider, MD   Denosumab (PROLIA SC) Inject into the skin every 6 months  Yes Historical Provider, MD   Red Yeast Rice 600 MG TABS Take 600 mg by mouth 2 times daily Yes Historical Provider, MD   Turmeric 500 MG TABS Take 500 mg by mouth 2 times daily Yes Historical Provider, MD   Cinnamon 500 MG CAPS Take 500 mg by mouth 2 times daily Yes Historical Provider, MD   Omega-3 Fatty Acids (FISH OIL) 1000 MG CAPS Take 1,000 mg by mouth 2 times daily  Yes Historical Provider, MD   POTASSIUM CHLORIDE PO Take by mouth daily 99 mg takes 1/2 tab unless having cramps then takes whole tab. Yes Historical Provider, MD   fluticasone (FLONASE) 50 MCG/ACT nasal spray 2 sprays bilaterally qd Yes Ramon Collins MD   vitamin D 1000 units CAPS Take by mouth Yes Historical Provider, MD   Sennosides (SENNA LAXATIVE PO) Take by mouth Yes Historical Provider, MD   MAGNESIUM PO Take by mouth 2 times daily  Yes Historical Provider, MD   Calcium Carb-Cholecalciferol 600-500 MG-UNIT CAPS Take 1,000 Units by mouth daily Yes Carla Albarran MD   leuprolide (LUPRON) 11.25 MG injection Inject 11.25 mg into the muscle every 6 months  Yes Historical Provider, MD   NONFORMULARY Quercetin  Patient not taking: Reported on 4/18/2022  Historical Provider, MD   clindamycin (CLEOCIN-T) 1 % external solution Apply to new lesions bid.   Cathryn Heard MD       [facility] (Including outside providers/suppliers regularly involved in providing care):   Patient Care Team:  Ramon Collins MD as PCP - Negrito Bolivar MD as PCP - [facility] Empaneled Provider  Javier Flynn MD (Orthopedic Surgery)  Navneet Stoner as Physician Assistant (Orthopedic Surgery)  Risa Ferreira MD (Orthopedic Surgery)    Reviewed and updated this visit:  Tobacco  Allergies  Meds  Med Hx  Surg Hx  Soc Hx  Fam Hx

## 2022-04-18 NOTE — PATIENT INSTRUCTIONS
Personalized Preventive Plan for Justin Marcos - 4/18/2022  Medicare offers a range of preventive health benefits. Some of the tests and screenings are paid in full while other may be subject to a deductible, co-insurance, and/or copay. Some of these benefits include a comprehensive review of your medical history including lifestyle, illnesses that may run in your family, and various assessments and screenings as appropriate. After reviewing your medical record and screening and assessments performed today your provider may have ordered immunizations, labs, imaging, and/or referrals for you. A list of these orders (if applicable) as well as your Preventive Care list are included within your After Visit Summary for your review. Other Preventive Recommendations:    · A preventive eye exam performed by an eye specialist is recommended every 1-2 years to screen for glaucoma; cataracts, macular degeneration, and other eye disorders. · A preventive dental visit is recommended every 6 months. · Try to get at least 150 minutes of exercise per week or 10,000 steps per day on a pedometer . · Order or download the FREE \"Exercise & Physical Activity: Your Everyday Guide\" from [employer]. Call 9-727.808.8558 or search [employer] online. · You need 9369-1783 mg of calcium and 1079-2397 IU of vitamin D per day. It is possible to meet your calcium requirement with diet alone, but a vitamin D supplement is usually necessary to meet this goal.  · When exposed to the sun, use a sunscreen that protects against both UVA and UVB radiation with an SPF of 30 or greater. Reapply every 2 to 3 hours or after sweating, drying off with a towel, or swimming. · Always wear a seat belt when traveling in a car. Always wear a helmet when riding a bicycle or motorcycle.

## 2022-05-11 ENCOUNTER — PATIENT MESSAGE (OUTPATIENT)
Dept: FAMILY MEDICINE CLINIC | Age: 67
End: 2022-05-11

## 2022-05-11 DIAGNOSIS — R41.3 MEMORY DISTURBANCE: ICD-10-CM

## 2022-05-12 RX ORDER — DONEPEZIL HYDROCHLORIDE 10 MG/1
10 TABLET, FILM COATED ORAL NIGHTLY
Qty: 90 TABLET | Refills: 3 | Status: SHIPPED | OUTPATIENT
Start: 2022-05-12

## 2022-05-12 NOTE — TELEPHONE ENCOUNTER
From: Joss Cardozo  To: Dr. Varinder Holguin  Sent: 5/11/2022 6:00 PM EDT  Subject: Prescription refill    I am out of refills on my prescription for Donepezil HCL 10mg. Send to [facility].    Thank you

## 2022-05-30 ENCOUNTER — PATIENT MESSAGE (OUTPATIENT)
Dept: FAMILY MEDICINE CLINIC | Age: 67
End: 2022-05-30

## 2022-05-30 DIAGNOSIS — J30.1 SEASONAL ALLERGIC RHINITIS DUE TO POLLEN: ICD-10-CM

## 2022-05-31 RX ORDER — LORATADINE AND PSEUDOEPHEDRINE SULFATE 10; 240 MG/1; MG/1
TABLET, FILM COATED, EXTENDED RELEASE ORAL
Qty: 90 TABLET | Refills: 3 | Status: SHIPPED | OUTPATIENT
Start: 2022-05-31

## 2022-05-31 NOTE — TELEPHONE ENCOUNTER
From: Ayaan Max  To: Dr. Mary Zhu  Sent: 5/30/2022 7:56 AM EDT  Subject: Prescription refill    I need to have a new prescription for my generic Claritin D 24. I should have 1 refill left on my current prescription, but for some reason it says I have . 99 refills left? The pharmacy will not fill due to it being less than 1 refill. Please send prescription to the [address] [place] [address] in [place], [place]. Thank you.

## 2022-06-02 ENCOUNTER — HOSPITAL ENCOUNTER (OUTPATIENT)
Age: 67
Discharge: HOME | End: 2022-06-02
Payer: MEDICARE

## 2022-06-02 DIAGNOSIS — E78.5 HYPERLIPIDEMIA, UNSPECIFIED HYPERLIPIDEMIA TYPE: ICD-10-CM

## 2022-06-02 DIAGNOSIS — I10 ESSENTIAL HYPERTENSION: Primary | ICD-10-CM

## 2022-06-02 DIAGNOSIS — I10 ESSENTIAL HYPERTENSION: ICD-10-CM

## 2022-06-02 LAB
A/G RATIO: 1.9 (ref 1.1–2.2)
ALBUMIN SERPL-MCNC: 4.3 G/DL (ref 3.4–5)
ALP BLD-CCNC: 113 U/L (ref 40–129)
ALT SERPL-CCNC: 21 U/L (ref 10–40)
ANION GAP SERPL CALCULATED.3IONS-SCNC: 12 MMOL/L (ref 3–16)
AST SERPL-CCNC: 21 U/L (ref 15–37)
BASOPHILS ABSOLUTE: 0.1 K/UL (ref 0–0.2)
BASOPHILS RELATIVE PERCENT: 0.9 %
BILIRUB SERPL-MCNC: 0.4 MG/DL (ref 0–1)
BUN BLDV-MCNC: 35 MG/DL (ref 7–20)
CALCIUM SERPL-MCNC: 10.3 MG/DL (ref 8.3–10.6)
CHLORIDE BLD-SCNC: 100 MMOL/L (ref 99–110)
CO2: 25 MMOL/L (ref 21–32)
CREAT SERPL-MCNC: 1 MG/DL (ref 0.8–1.3)
EOSINOPHILS ABSOLUTE: 0.8 K/UL (ref 0–0.6)
EOSINOPHILS RELATIVE PERCENT: 8.8 %
GFR AFRICAN AMERICAN: >60
GFR NON-AFRICAN AMERICAN: >60
GLUCOSE BLD-MCNC: 104 MG/DL (ref 70–99)
HCT VFR BLD CALC: 42.2 % (ref 40.5–52.5)
HEMOGLOBIN: 14.2 G/DL (ref 13.5–17.5)
LYMPHOCYTES ABSOLUTE: 2.4 K/UL (ref 1–5.1)
LYMPHOCYTES RELATIVE PERCENT: 27 %
MCH RBC QN AUTO: 30.8 PG (ref 26–34)
MCHC RBC AUTO-ENTMCNC: 33.6 G/DL (ref 31–36)
MCV RBC AUTO: 91.7 FL (ref 80–100)
MONOCYTES ABSOLUTE: 0.9 K/UL (ref 0–1.3)
MONOCYTES RELATIVE PERCENT: 9.7 %
NEUTROPHILS ABSOLUTE: 4.7 K/UL (ref 1.7–7.7)
NEUTROPHILS RELATIVE PERCENT: 53.6 %
PDW BLD-RTO: 14.5 % (ref 12.4–15.4)
PLATELET # BLD: 234 K/UL (ref 135–450)
PMV BLD AUTO: 7.8 FL (ref 5–10.5)
POTASSIUM SERPL-SCNC: 4.5 MMOL/L (ref 3.5–5.1)
PROSTATE SPECIFIC ANTIGEN: <0.01 NG/ML (ref 0–4)
RBC # BLD: 4.6 M/UL (ref 4.2–5.9)
SODIUM BLD-SCNC: 137 MMOL/L (ref 136–145)
TOTAL PROTEIN: 6.6 G/DL (ref 6.4–8.2)
WBC # BLD: 8.8 K/UL (ref 4–11)

## 2022-06-02 PROCEDURE — 80053 COMPREHEN METABOLIC PANEL: CPT

## 2022-06-02 PROCEDURE — 80061 LIPID PANEL: CPT

## 2022-06-02 PROCEDURE — 84403 ASSAY OF TOTAL TESTOSTERONE: CPT

## 2022-06-02 PROCEDURE — 36415 COLL VENOUS BLD VENIPUNCTURE: CPT

## 2022-06-02 PROCEDURE — 85025 COMPLETE CBC W/AUTO DIFF WBC: CPT

## 2022-06-02 PROCEDURE — 83036 HEMOGLOBIN GLYCOSYLATED A1C: CPT

## 2022-06-02 PROCEDURE — 84153 ASSAY OF PSA TOTAL: CPT

## 2022-06-03 LAB
CHOLESTEROL, TOTAL: 246 MG/DL (ref 0–199)
ESTIMATED AVERAGE GLUCOSE: 122.6 MG/DL
HBA1C MFR BLD: 5.9 %
HDLC SERPL-MCNC: 80 MG/DL (ref 40–60)
LDL CHOLESTEROL CALCULATED: 145 MG/DL
TRIGL SERPL-MCNC: 104 MG/DL (ref 0–150)
VLDLC SERPL CALC-MCNC: 21 MG/DL

## 2022-06-04 LAB — TESTOSTERONE TOTAL: 4 NG/DL (ref 220–1000)

## 2022-08-15 DIAGNOSIS — I10 ESSENTIAL HYPERTENSION: ICD-10-CM

## 2022-08-15 RX ORDER — HYDROCHLOROTHIAZIDE 12.5 MG/1
CAPSULE, GELATIN COATED ORAL
Qty: 90 CAPSULE | Refills: 3 | Status: SHIPPED | OUTPATIENT
Start: 2022-08-15

## 2022-10-04 NOTE — ED PROVIDER NOTES
[facility]      Pt Name: Ruslan Tripp  MRN: [id number]  Jeradgfyovany 1955  Date of evaluation: 7/20/2020  Provider:  Bang Gregorio MD                  HISTORY OF PRESENT ILLNESS   (Location/Symptom, Timing/Onset, Context/Setting, Quality, Duration, Modifying Factors, Severity)  Note limiting factors. Patient presents emergency department with complaint of left arm pain. Patient states it is a deep pain there are positions where it feels better it is worse if it hangs down it is better if it is up over his head but he otherwise cannot fully reproduce the discomfort. He states he does not have shortness of breath with it except when the pain was intense he felt slightly short of breath he denies any chest pain. He states that on Friday today is Monday he began with a tingling sensation to the shoulder he states that he could take Tylenol and it would go away he states that he went to bed last night with it that he drinks a small amount of whiskey and took Tylenol and then went to bed but if the pain was there when he went to bed it was there when he woke up. He states that then got better but then recurred he would guess between 1030 and 11:00 this morning it is currently 4:14. He states that it is a deep pain that is not reproducible and he is never had it before. He cannot describe it. Patient is a non-smoker he does have a history of prostate cancer is on Lupron and Prolia. Has not had any swelling in the arm he does have a bruise over the bicep but he cannot explain where it came from and palpating the area does not cause him discomfort. Patient has had an EKG in the past prior to his surgery 5 years ago but has never had a cardiac work-up. He is a non-smoker. Nursing Notes were reviewed.     REVIEW OF SYSTEMS    (2-9 systems for level 4, 10 or more for level 5)     Review of Systems   Constitutional: Negative for chills Called patient to advise her to complete evisit in mychart. No asnwer , left voicemail to return call.    and fever. HENT: Negative for ear pain and sore throat. Eyes: Negative for visual disturbance. Respiratory: Negative for cough and shortness of breath. Cardiovascular: Negative for chest pain. Gastrointestinal: Negative for abdominal pain, diarrhea, nausea and vomiting. Genitourinary: Negative for dysuria. Musculoskeletal: Positive for back pain. Djd in back   Skin: Negative for rash. Neurological: Negative for headaches. PAST MEDICAL HISTORY   has a past medical history of Actinic keratoses, Allergic rhinitis, Arthritis, Environmental allergies, and Prostate cancer ([facility] [address] PAST SURGICAL HISTORY   has a past surgical history that includes shoulder surgery; knee surgery (Right, 10/15/13); knee surgery (Right, 10/15/13); Prostatectomy (07/17/15); Colonoscopy (2-16-16); Knee arthroscopy (Left, 4/26/16); epidural steroid injection (N/A, 2/26/2019); epidural steroid injection (N/A, 7/30/2019); epidural steroid injection (N/A, 10/22/2019); and Pain management procedure (N/A, 5/18/2020). FAMILY HISTORY  family history includes Alzheimer's Disease in his maternal grandmother and mother; Arthritis in his father and paternal grandfather; Cancer in his paternal grandfather; Diabetes in his mother; Heart Disease in his paternal uncle; Heart Disease (age of onset: 79) in his mother; Heart Disease (age of onset: 68) in his father; High Blood Pressure in his father and mother; Stroke in his father. SOCIAL HISTORY   reports that he has never smoked. He has never used smokeless tobacco. He reports that he does not drink alcohol or use drugs. HOME MEDICATIONS     Prior to Admission medications    Medication Sig Start Date End Date Taking?  Authorizing Provider   nitrofurantoin (MACRODANTIN) 100 MG capsule Take 100 mg by mouth daily    Historical Provider, MD   Ascorbic Acid (VITAMIN C) 1000 MG tablet Take 1,000 mg by mouth daily    Historical Provider, MD   Denosumab (PROLIA SC) Inject into the skin every 6 months    Historical Provider, MD   donepezil (ARICEPT) 10 MG tablet Take 1 tablet by mouth nightly 4/13/20   Rashard Rangel MD   meloxicam (MOBIC) 15 MG tablet TAKE 1 TABLET BY MOUTH ONCE DAILY 10/8/19   Rashard Rangel MD   diclofenac sodium 1 % GEL Apply 2 g topically 4 times daily 9/16/19   Rashard Rangel MD   Red Yeast Rice 600 MG TABS Take 600 mg by mouth 2 times daily    Historical Provider, MD   Turmeric 500 MG TABS Take 500 mg by mouth 2 times daily    Historical Provider, MD   Cinnamon 500 MG CAPS Take 500 mg by mouth 2 times daily    Historical Provider, MD   Omega-3 Fatty Acids (FISH OIL) 1000 MG CAPS Take 2,000 mg by mouth 2 times daily    Historical Provider, MD   loratadine-pseudoephedrine (ALLERGY RELIEF D-24)  MG per extended release tablet TAKE ONE TABLET BY MOUTH DAILY 7/3/19   Rashard Rangel MD   potassium chloride (KLOR-CON) 20 MEQ packet Take 20 mEq by mouth 2 times daily    Historical Provider, MD   fluticasone Nathaneil Hausen) 50 MCG/ACT nasal spray 2 sprays bilaterally qd 4/2/18   Rashard Rangel MD   vitamin D 1000 units CAPS Take by mouth    Historical Provider, MD   aspirin 81 MG tablet Take 81 mg by mouth daily    Historical Provider, MD   Sennosides (SENNA LAXATIVE PO) Take by mouth    Historical Provider, MD   MAGNESIUM PO Take by mouth    Historical Provider, MD   Calcium Carb-Cholecalciferol 600-500 MG-UNIT CAPS Take 1,000 Units by mouth daily 4/4/16   Courtney Gutierrez MD   leuprolide (LUPRON) 11.25 MG injection Inject 11.25 mg into the muscle once    Historical Provider, MD   acetaminophen (TYLENOL) 500 MG tablet Take 1 tablet by mouth every 6 hours as needed for Pain 7/9/15   Courtney Gutierrez MD        ALLERGIES  is allergic to cephalexin.             PHYSICAL EXAM    (up to 7 for level 4, 8 or more for level 5)         ED TRIAGE VITALS      BP (!) 174/100   Pulse 72   Resp 18   Ht 5' 5\" (1.651 m)   Wt 185 lb (83.9 kg)   SpO2 98%   BMI 30.79 kg/m² Physical Exam  Constitutional:       General: He is not in acute distress. Appearance: He is well-developed. He is not ill-appearing, toxic-appearing or diaphoretic. HENT:      Head: Normocephalic and atraumatic. Right Ear: Tympanic membrane and ear canal normal.      Left Ear: Tympanic membrane and ear canal normal.      Nose: Nose normal. No congestion or rhinorrhea. Mouth/Throat:      Mouth: Mucous membranes are moist.      Pharynx: Oropharynx is clear. No oropharyngeal exudate or posterior oropharyngeal erythema. Eyes:      Conjunctiva/sclera: Conjunctivae normal.      Pupils: Pupils are equal, round, and reactive to light. Neck:      Musculoskeletal: Normal range of motion and neck supple. No neck rigidity. Cardiovascular:      Rate and Rhythm: Normal rate and regular rhythm. Pulses: Normal pulses. Heart sounds: Normal heart sounds. No murmur. No friction rub. No gallop. Pulmonary:      Effort: Pulmonary effort is normal. No respiratory distress. Breath sounds: Normal breath sounds. No stridor. No wheezing, rhonchi or rales. Abdominal:      General: Bowel sounds are normal. There is no distension. Palpations: Abdomen is soft. Abdomen is not rigid. There is no mass. Tenderness: There is no abdominal tenderness. There is no guarding or rebound. Hernia: No hernia is present. Musculoskeletal: Normal range of motion. General: No swelling or tenderness. Left shoulder: He exhibits normal range of motion, no tenderness, no bony tenderness, no swelling, no crepitus, no deformity, normal pulse and normal strength. Cervical back: He exhibits normal range of motion, no tenderness, no bony tenderness, no swelling, no edema and normal pulse. Hands:       Right lower leg: No edema. Left lower leg: No edema. Lymphadenopathy:      Cervical: No cervical adenopathy. Skin:     General: Skin is warm and dry.       Capillary Refill: musculoskeletal injury there is no indication of a bicep tendon rupture. Position does not make a difference. We discussed whether or not to give him nitroglycerin. She understands and is agreeable to the nitroglycerin then possibly pain medication and aspirin and possible admission to the hospital for further cardiac rule out we discussed the possibility that this could be related to medications as well such as his parole. Pulmonary review of his x-rays did not show any lytic type lesions. HEART SCORE:    HISTORY: Slightly Suspicious:0  ECG: Normal:0  AGE:>65 years:2,  RISK FACTORS:, No risk factors known: 0  TROPONINS:>3x normal limit:2, >1 and<3x normal limit:2, <1x normal limit:0    Spoke with the patient and his wife. They are very well-informed and understand the risks and limitations of work-up and initially are agreeable to admission to the hospital and pain management but then decided that they would prefer to go home. Again they are awake alert oriented cooperative understand the risks of going home and the benefits of staying they will agreed with 3-hour troponin and will contact the primary care physician. It is felt that the likelihood that this is cardiac is limited. Again is pain is worse with positioning it is worse with a blood pressure cuff being on and has been present intermittently for days. I spoke with Dr. Cosmo Mosley. We thoroughly discussed the history, physical exam, laboratory and imaging studies, as well as, emergency department course. Based upon that discussion, we've decided to discharge Lisa Neff home. We've agreed upon prompt follow in their office for a re-assessment. 7:18 PM EDT: I discussed with them the patient and his wife the limitations of the work-up they understand he has not had a stress test is not an angiogram is not had serial troponins. They are very comfortable with the idea of being discharged home.   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 That he must rest and drink plenty of fluids. He is following up with Dr. Tawanda Elmore tomorrow and will return for any chest pain shortness of breath swelling to the arm or any other problems. Again we discussed that clinically at this time does not appear to be consistent with DVT they are comfortable with no d-dimer being done he is again is had no shortness of breath. There is no evidence of any muscle disruption or tendon disruption. He is neurovascularly intact. We discussed again different possible etiologies and the need for follow-up and return for any problems they are agreeable plan voiced understanding of all instructions and he is discharged in good condition. He is awake alert oriented discussed results, diagnosis and plan with patient and/or family. Questions addressed. Disposition and follow-up agreed upon. Specific discharge instructions explained. The patient and/or family and I have discussed the diagnosis and risks, and we agree with discharging home to follow-up with their primary care, specialist or referral doctor. We also discussed returning to the Emergency Department immediately if new or worsening symptoms occur. We have discussed the symptoms which are most concerning that necessitate immediate return. The Clinical Impression is left arm pain            No flowsheet data found. (Please note that portions of this note were completed with a voice recognition program.  Efforts were made to edit the dictations but occasionally words are mis-transcribed.)    Cara Bonds MD (electronically signed)  Attending Emergency Physician      This document serves as a record of the services and decisions personally performed by myself, Christy Schneider MD.   This dictation was generated by voice recognition computer software.   Although all attempts are made to edit the dictation for accuracy, there may be errors in the transcription that are not intended.             Christy Schneider MD  07/20/20 [id number]

## 2022-10-17 ENCOUNTER — OFFICE VISIT (OUTPATIENT)
Dept: FAMILY MEDICINE CLINIC | Age: 67
End: 2022-10-17
Payer: MEDICARE

## 2022-10-17 VITALS
DIASTOLIC BLOOD PRESSURE: 80 MMHG | WEIGHT: 191.6 LBS | SYSTOLIC BLOOD PRESSURE: 130 MMHG | BODY MASS INDEX: 33.95 KG/M2 | HEIGHT: 63 IN | HEART RATE: 84 BPM | OXYGEN SATURATION: 97 %

## 2022-10-17 DIAGNOSIS — R41.3 MEMORY DISTURBANCE: ICD-10-CM

## 2022-10-17 DIAGNOSIS — I10 ESSENTIAL HYPERTENSION: Primary | ICD-10-CM

## 2022-10-17 DIAGNOSIS — M19.90 ARTHRITIS: ICD-10-CM

## 2022-10-17 DIAGNOSIS — E78.5 HYPERLIPIDEMIA, UNSPECIFIED HYPERLIPIDEMIA TYPE: ICD-10-CM

## 2022-10-17 DIAGNOSIS — Z23 NEED FOR IMMUNIZATION AGAINST INFLUENZA: ICD-10-CM

## 2022-10-17 DIAGNOSIS — R73.09 ABNORMAL GLUCOSE: ICD-10-CM

## 2022-10-17 DIAGNOSIS — C61 PROSTATE CANCER (HCC): ICD-10-CM

## 2022-10-17 PROCEDURE — G0008 ADMIN INFLUENZA VIRUS VAC: HCPCS | Performed by: FAMILY MEDICINE

## 2022-10-17 PROCEDURE — 1036F TOBACCO NON-USER: CPT | Performed by: FAMILY MEDICINE

## 2022-10-17 PROCEDURE — 90694 VACC AIIV4 NO PRSRV 0.5ML IM: CPT | Performed by: FAMILY MEDICINE

## 2022-10-17 PROCEDURE — G8427 DOCREV CUR MEDS BY ELIG CLIN: HCPCS | Performed by: FAMILY MEDICINE

## 2022-10-17 PROCEDURE — G8417 CALC BMI ABV UP PARAM F/U: HCPCS | Performed by: FAMILY MEDICINE

## 2022-10-17 PROCEDURE — 1123F ACP DISCUSS/DSCN MKR DOCD: CPT | Performed by: FAMILY MEDICINE

## 2022-10-17 PROCEDURE — G8484 FLU IMMUNIZE NO ADMIN: HCPCS | Performed by: FAMILY MEDICINE

## 2022-10-17 PROCEDURE — 3017F COLORECTAL CA SCREEN DOC REV: CPT | Performed by: FAMILY MEDICINE

## 2022-10-17 PROCEDURE — 99214 OFFICE O/P EST MOD 30 MIN: CPT | Performed by: FAMILY MEDICINE

## 2022-10-17 RX ORDER — MELOXICAM 15 MG/1
TABLET ORAL
Qty: 90 TABLET | Refills: 3 | Status: SHIPPED | OUTPATIENT
Start: 2022-10-17

## 2022-10-17 ASSESSMENT — ENCOUNTER SYMPTOMS
SHORTNESS OF BREATH: 0
DIARRHEA: 0
CONSTIPATION: 0

## 2022-10-17 NOTE — PROGRESS NOTES
Chief Complaint   Patient presents with    Check-Up     6 month check up       HPI:  Mackenzie Shetty is a 69 y.o. (: 1955) here today   for 6 month check up. HPI  No sig new issues noted. Knees overall doing well. Less involved w/ deer hunting. On the tractor a lot. Has still been taking dicolfenac. Taking bid due to joint pain, not just knees. Prefers to cont on medication. Next ortho appt early dec. Has had some mild hip pain. Next appt in early dec for lupron injection. Last testosterone minimally above prior. No new sxs noted. Seen at urology group. Memory near baseline. Bp has been well controlled on current meds. Patient's medications, allergies, past medical, surgical, social and family histories were reviewed and updated as appropriate. ROS:  Review of Systems   Constitutional:  Negative for fever. Respiratory:  Negative for shortness of breath. Cardiovascular:  Negative for chest pain. Gastrointestinal:  Negative for constipation and diarrhea.          Hemoglobin A1C (%)   Date Value   2022 5.9     Microscopic Examination (no units)   Date Value   2022 YES     LDL Calculated (mg/dL)   Date Value   2022 145 (H)       Past Medical History:   Diagnosis Date    Actinic keratoses     Allergic rhinitis     Arthritis     Environmental allergies     Malignant melanoma of face excluding eyelid, nose, lip, and ear ([address]     Prostate cancer ([address]     PROSTATE       Family History   Problem Relation Age of Onset    Diabetes Mother     Heart Disease Mother 79        2 vessel bypass    High Blood Pressure Mother     Alzheimer's Disease Mother     Arthritis Father     Heart Disease Father 68        stents    High Blood Pressure Father     Stroke Father     Heart Disease Paternal Uncle     Alzheimer's Disease Maternal Grandmother     Cancer Paternal Grandfather     Arthritis Paternal Grandfather        Social History     Socioeconomic History Marital status:      Spouse name: Not on file    Number of children: Not on file    Years of education: Not on file    Highest education level: Not on file   Occupational History    Not on file   Tobacco Use    Smoking status: Never    Smokeless tobacco: Never    Tobacco comments:     no history smoking, used to raise a lot of tobacco   Substance and Sexual Activity    Alcohol use: Yes     Alcohol/week: 1.0 standard drink     Types: 1 Shots of liquor per week     Comment: nightly    Drug use: No    Sexual activity: Yes     Partners: Female   Other Topics Concern    Not on file   Social History Narrative    Not on file     Social Determinants of Health     Financial Resource Strain: Not on file   Food Insecurity: Not on file   Transportation Needs: Not on file   Physical Activity: Insufficiently Active    Days of Exercise per Week: 4 days    Minutes of Exercise per Session: 20 min   Stress: Not on file   Social Connections: Not on file   Intimate Partner Violence: Not on file   Housing Stability: Not on file       Prior to Visit Medications    Medication Sig Taking?  Authorizing Provider   meloxicam (MOBIC) 15 MG tablet TAKE 1 TABLET BY MOUTH ONCE DAILY Yes Martin Rowley MD   hydroCHLOROthiazide (MICROZIDE) 12.5 MG capsule Take 1 capsule by mouth once daily in the morning Yes Caroline Villegas APRN - CNP   loratadine-pseudoephedrine (ALLERGY RELIEF D-24)  MG per extended release tablet TAKE ONE TABLET BY MOUTH DAILY Yes Martin Rowley MD   donepezil (ARICEPT) 10 MG tablet Take 1 tablet by mouth nightly Yes Martin Rowley MD   Niacinamide-Zn-Cu-Hpjtzg-Ly-Xg (NICOTINAMIDE PO) Take by mouth daily Yes Historical Provider, MD   zinc sulfate (ZINCATE) 220 (50 Zn) MG capsule Take 50 mg by mouth daily Yes Historical Provider, MD   NONFORMULARY Quercetin Yes Historical Provider, MD   diclofenac (VOLTAREN) 75 MG EC tablet Take 1 tablet by mouth 2 times daily Yes Rashmi Baca DO   Ascorbic Acid (VITAMIN C) 1000 MG tablet Take 2,000 mg by mouth daily  Yes Historical Provider, MD   Denosumab (PROLIA SC) Inject into the skin every 6 months  Yes Historical Provider, MD   Red Yeast Rice 600 MG TABS Take 600 mg by mouth 2 times daily Yes Historical Provider, MD   Turmeric 500 MG TABS Take 500 mg by mouth 2 times daily Yes Historical Provider, MD   Cinnamon 500 MG CAPS Take 500 mg by mouth 2 times daily Yes Historical Provider, MD   Omega-3 Fatty Acids (FISH OIL) 1000 MG CAPS Take 1,000 mg by mouth 2 times daily  Yes Historical Provider, MD   POTASSIUM CHLORIDE PO Take by mouth daily 99 mg takes 1/2 tab unless having cramps then takes whole tab. Yes Historical Provider, MD   fluticasone (FLONASE) 50 MCG/ACT nasal spray 2 sprays bilaterally qd Yes Lev Sepulveda MD   vitamin D 1000 units CAPS Take by mouth Yes Historical Provider, MD   Sennosides (SENNA LAXATIVE PO) Take by mouth Yes Historical Provider, MD   MAGNESIUM PO Take by mouth 2 times daily  Yes Historical Provider, MD   Calcium Carb-Cholecalciferol 600-500 MG-UNIT CAPS Take 1,000 Units by mouth daily Yes Sophie Reid MD   leuprolide (LUPRON) 11.25 MG injection Inject 11.25 mg into the muscle every 6 months  Yes Historical Provider, MD   clindamycin (CLEOCIN-T) 1 % external solution Apply to new lesions bid. Viktoriya Parker MD       Allergies   Allergen Reactions    Cephalexin Hives    Lisinopril      angio edema        OBJECTIVE:    /80   Pulse 84   Ht 5' 3\" (1.6 m)   Wt 191 lb 9.6 oz (86.9 kg)   SpO2 97%   BMI 33.94 kg/m²     BP Readings from Last 2 Encounters:   10/17/22 130/80   04/18/22 134/86       Wt Readings from Last 3 Encounters:   10/17/22 191 lb 9.6 oz (86.9 kg)   04/18/22 180 lb 9.6 oz (81.9 kg)   02/02/22 179 lb (81.2 kg)       Physical Exam  Constitutional:       Appearance: Normal appearance. HENT:      Head: Normocephalic and atraumatic. Eyes:      Extraocular Movements: Extraocular movements intact.    Neck:      Vascular: No carotid bruit. Cardiovascular:      Rate and Rhythm: Normal rate and regular rhythm. Pulmonary:      Effort: Pulmonary effort is normal.      Breath sounds: Normal breath sounds. Abdominal:      Palpations: Abdomen is soft. Tenderness: There is no abdominal tenderness. Musculoskeletal:      Right lower leg: No edema. Left lower leg: No edema. Skin:     General: Skin is warm and dry. Neurological:      General: No focal deficit present. Mental Status: He is alert and oriented to person, place, and time. Psychiatric:         Mood and Affect: Mood normal.         Behavior: Behavior normal.         ASSESSMENT/PLAN:    1. Essential hypertension  The current medical regimen is effective;  continue present plan and medications. Rpt labs w/ next draw  - Comprehensive Metabolic Panel; Future  - CBC with Auto Differential; Future    2. Prostate cancer ([facility] [address]  Psa has been approp suppressed. F/u w/ Uro as sched. - CBC with Auto Differential; Future    3. Memory disturbance  Near baseline. 4. Hyperlipidemia, unspecified hyperlipidemia type  Rpt labs. - Lipid Panel; Future    5. Abnormal glucose  Rpt labs at next draw. - Hemoglobin A1C; Future    6. Arthritis  Overall knee pain improved. Other joint pain. Refill meds. - meloxicam (MOBIC) 15 MG tablet; TAKE 1 TABLET BY MOUTH ONCE DAILY  Dispense: 90 tablet; Refill: 3    7.  Need for immunization against influenza    - Influenza, FLUAD, (age 72 y+), IM, Preservative Free, 0.5 mL

## 2022-11-29 ENCOUNTER — HOSPITAL ENCOUNTER (OUTPATIENT)
Age: 67
Discharge: HOME | End: 2022-11-29
Payer: MEDICARE

## 2022-11-29 DIAGNOSIS — C61 PROSTATE CANCER (HCC): ICD-10-CM

## 2022-11-29 DIAGNOSIS — R73.09 ABNORMAL GLUCOSE: ICD-10-CM

## 2022-11-29 DIAGNOSIS — I10 ESSENTIAL HYPERTENSION: ICD-10-CM

## 2022-11-29 DIAGNOSIS — E78.5 HYPERLIPIDEMIA, UNSPECIFIED HYPERLIPIDEMIA TYPE: ICD-10-CM

## 2022-11-29 LAB
A/G RATIO: 1.4 (ref 1.1–2.2)
ALBUMIN SERPL-MCNC: 4.1 G/DL (ref 3.4–5)
ALP BLD-CCNC: 96 U/L (ref 40–129)
ALT SERPL-CCNC: 21 U/L (ref 10–40)
ANION GAP SERPL CALCULATED.3IONS-SCNC: 12 MMOL/L (ref 3–16)
AST SERPL-CCNC: 21 U/L (ref 15–37)
BASOPHILS ABSOLUTE: 0.1 K/UL (ref 0–0.2)
BASOPHILS RELATIVE PERCENT: 0.8 %
BILIRUB SERPL-MCNC: 0.4 MG/DL (ref 0–1)
BUN BLDV-MCNC: 28 MG/DL (ref 7–20)
CALCIUM SERPL-MCNC: 10.4 MG/DL (ref 8.3–10.6)
CHLORIDE BLD-SCNC: 103 MMOL/L (ref 99–110)
CHOLESTEROL, TOTAL: 223 MG/DL (ref 0–199)
CO2: 24 MMOL/L (ref 21–32)
CREAT SERPL-MCNC: 1 MG/DL (ref 0.8–1.3)
EOSINOPHILS ABSOLUTE: 0.7 K/UL (ref 0–0.6)
EOSINOPHILS RELATIVE PERCENT: 7.8 %
GFR SERPL CREATININE-BSD FRML MDRD: >60 ML/MIN/{1.73_M2}
GLUCOSE BLD-MCNC: 102 MG/DL (ref 70–99)
HCT VFR BLD CALC: 41.4 % (ref 40.5–52.5)
HDLC SERPL-MCNC: 86 MG/DL (ref 40–60)
HEMOGLOBIN: 14.2 G/DL (ref 13.5–17.5)
LDL CHOLESTEROL CALCULATED: 120 MG/DL
LYMPHOCYTES ABSOLUTE: 2.5 K/UL (ref 1–5.1)
LYMPHOCYTES RELATIVE PERCENT: 28 %
MCH RBC QN AUTO: 31.8 PG (ref 26–34)
MCHC RBC AUTO-ENTMCNC: 34.2 G/DL (ref 31–36)
MCV RBC AUTO: 93 FL (ref 80–100)
MONOCYTES ABSOLUTE: 0.7 K/UL (ref 0–1.3)
MONOCYTES RELATIVE PERCENT: 8.2 %
NEUTROPHILS ABSOLUTE: 4.9 K/UL (ref 1.7–7.7)
NEUTROPHILS RELATIVE PERCENT: 55.2 %
PDW BLD-RTO: 13.4 % (ref 12.4–15.4)
PLATELET # BLD: 269 K/UL (ref 135–450)
PMV BLD AUTO: 6.6 FL (ref 5–10.5)
POTASSIUM SERPL-SCNC: 4 MMOL/L (ref 3.5–5.1)
PROSTATE SPECIFIC ANTIGEN: <0.01 NG/ML (ref 0–4)
RBC # BLD: 4.45 M/UL (ref 4.2–5.9)
SODIUM BLD-SCNC: 139 MMOL/L (ref 136–145)
TOTAL PROTEIN: 7 G/DL (ref 6.4–8.2)
TRIGL SERPL-MCNC: 87 MG/DL (ref 0–150)
VLDLC SERPL CALC-MCNC: 17 MG/DL
WBC # BLD: 8.9 K/UL (ref 4–11)

## 2022-11-29 PROCEDURE — 84403 ASSAY OF TOTAL TESTOSTERONE: CPT

## 2022-11-29 PROCEDURE — 36415 COLL VENOUS BLD VENIPUNCTURE: CPT

## 2022-11-29 PROCEDURE — 85025 COMPLETE CBC W/AUTO DIFF WBC: CPT

## 2022-11-29 PROCEDURE — 83036 HEMOGLOBIN GLYCOSYLATED A1C: CPT

## 2022-11-29 PROCEDURE — 80053 COMPREHEN METABOLIC PANEL: CPT

## 2022-11-29 PROCEDURE — 84153 ASSAY OF PSA TOTAL: CPT

## 2022-11-29 PROCEDURE — 80061 LIPID PANEL: CPT

## 2022-11-30 LAB
ESTIMATED AVERAGE GLUCOSE: 125.5 MG/DL
HBA1C MFR BLD: 6 %

## 2022-12-01 LAB — TESTOSTERONE TOTAL: <3 NG/DL (ref 220–1000)

## 2023-04-17 ENCOUNTER — OFFICE VISIT (OUTPATIENT)
Dept: FAMILY MEDICINE CLINIC | Age: 68
End: 2023-04-17
Payer: MEDICARE

## 2023-04-17 VITALS
BODY MASS INDEX: 34.2 KG/M2 | DIASTOLIC BLOOD PRESSURE: 82 MMHG | HEART RATE: 74 BPM | HEIGHT: 63 IN | OXYGEN SATURATION: 98 % | SYSTOLIC BLOOD PRESSURE: 128 MMHG | WEIGHT: 193 LBS

## 2023-04-17 DIAGNOSIS — C61 PROSTATE CANCER (HCC): ICD-10-CM

## 2023-04-17 DIAGNOSIS — R41.3 MEMORY DISTURBANCE: ICD-10-CM

## 2023-04-17 DIAGNOSIS — I10 ESSENTIAL HYPERTENSION: Primary | ICD-10-CM

## 2023-04-17 DIAGNOSIS — E78.5 HYPERLIPIDEMIA, UNSPECIFIED HYPERLIPIDEMIA TYPE: ICD-10-CM

## 2023-04-17 DIAGNOSIS — R73.09 ABNORMAL GLUCOSE: ICD-10-CM

## 2023-04-17 DIAGNOSIS — M25.50 PAIN IN JOINT INVOLVING MULTIPLE SITES: ICD-10-CM

## 2023-04-17 PROCEDURE — G8427 DOCREV CUR MEDS BY ELIG CLIN: HCPCS | Performed by: FAMILY MEDICINE

## 2023-04-17 PROCEDURE — 1123F ACP DISCUSS/DSCN MKR DOCD: CPT | Performed by: FAMILY MEDICINE

## 2023-04-17 PROCEDURE — G8417 CALC BMI ABV UP PARAM F/U: HCPCS | Performed by: FAMILY MEDICINE

## 2023-04-17 PROCEDURE — 3074F SYST BP LT 130 MM HG: CPT | Performed by: FAMILY MEDICINE

## 2023-04-17 PROCEDURE — 3017F COLORECTAL CA SCREEN DOC REV: CPT | Performed by: FAMILY MEDICINE

## 2023-04-17 PROCEDURE — 1036F TOBACCO NON-USER: CPT | Performed by: FAMILY MEDICINE

## 2023-04-17 PROCEDURE — 99214 OFFICE O/P EST MOD 30 MIN: CPT | Performed by: FAMILY MEDICINE

## 2023-04-17 PROCEDURE — 3079F DIAST BP 80-89 MM HG: CPT | Performed by: FAMILY MEDICINE

## 2023-04-17 RX ORDER — DICLOFENAC SODIUM 75 MG/1
75 TABLET, DELAYED RELEASE ORAL 2 TIMES DAILY
Qty: 60 TABLET | Refills: 3 | Status: SHIPPED | OUTPATIENT
Start: 2023-04-17

## 2023-04-17 SDOH — ECONOMIC STABILITY: FOOD INSECURITY: WITHIN THE PAST 12 MONTHS, THE FOOD YOU BOUGHT JUST DIDN'T LAST AND YOU DIDN'T HAVE MONEY TO GET MORE.: NEVER TRUE

## 2023-04-17 SDOH — ECONOMIC STABILITY: INCOME INSECURITY: HOW HARD IS IT FOR YOU TO PAY FOR THE VERY BASICS LIKE FOOD, HOUSING, MEDICAL CARE, AND HEATING?: NOT HARD AT ALL

## 2023-04-17 SDOH — ECONOMIC STABILITY: HOUSING INSECURITY
IN THE LAST 12 MONTHS, WAS THERE A TIME WHEN YOU DID NOT HAVE A STEADY PLACE TO SLEEP OR SLEPT IN A SHELTER (INCLUDING NOW)?: NO

## 2023-04-17 SDOH — ECONOMIC STABILITY: FOOD INSECURITY: WITHIN THE PAST 12 MONTHS, YOU WORRIED THAT YOUR FOOD WOULD RUN OUT BEFORE YOU GOT MONEY TO BUY MORE.: NEVER TRUE

## 2023-04-17 ASSESSMENT — PATIENT HEALTH QUESTIONNAIRE - PHQ9
1. LITTLE INTEREST OR PLEASURE IN DOING THINGS: 0
SUM OF ALL RESPONSES TO PHQ9 QUESTIONS 1 & 2: 0
SUM OF ALL RESPONSES TO PHQ QUESTIONS 1-9: 0
2. FEELING DOWN, DEPRESSED OR HOPELESS: 0
SUM OF ALL RESPONSES TO PHQ QUESTIONS 1-9: 0

## 2023-04-17 ASSESSMENT — ENCOUNTER SYMPTOMS: SHORTNESS OF BREATH: 0

## 2023-04-17 NOTE — PROGRESS NOTES
nsaid to see if helps. If sig worse, consider ortho. - diclofenac (VOLTAREN) 75 MG EC tablet; Take 1 tablet by mouth 2 times daily  Dispense: 60 tablet; Refill: 3    5. Abnormal glucose  Rpt labs   - Hemoglobin A1C; Future    6. Memory disturbance  Near baseline. Sig family hx of dementia. Cont meds for now.

## 2023-04-19 ENCOUNTER — OFFICE VISIT (OUTPATIENT)
Dept: FAMILY MEDICINE CLINIC | Age: 68
End: 2023-04-19

## 2023-04-19 VITALS
HEIGHT: 63 IN | OXYGEN SATURATION: 96 % | WEIGHT: 193 LBS | DIASTOLIC BLOOD PRESSURE: 82 MMHG | SYSTOLIC BLOOD PRESSURE: 138 MMHG | BODY MASS INDEX: 34.2 KG/M2 | HEART RATE: 88 BPM

## 2023-04-19 DIAGNOSIS — R41.3 MEMORY DISTURBANCE: ICD-10-CM

## 2023-04-19 DIAGNOSIS — Z00.00 MEDICARE ANNUAL WELLNESS VISIT, SUBSEQUENT: Primary | ICD-10-CM

## 2023-04-19 ASSESSMENT — PATIENT HEALTH QUESTIONNAIRE - PHQ9
1. LITTLE INTEREST OR PLEASURE IN DOING THINGS: 0
SUM OF ALL RESPONSES TO PHQ QUESTIONS 1-9: 0
SUM OF ALL RESPONSES TO PHQ QUESTIONS 1-9: 0
SUM OF ALL RESPONSES TO PHQ9 QUESTIONS 1 & 2: 0
SUM OF ALL RESPONSES TO PHQ QUESTIONS 1-9: 0
SUM OF ALL RESPONSES TO PHQ QUESTIONS 1-9: 0
2. FEELING DOWN, DEPRESSED OR HOPELESS: 0

## 2023-04-19 ASSESSMENT — LIFESTYLE VARIABLES: HOW MANY STANDARD DRINKS CONTAINING ALCOHOL DO YOU HAVE ON A TYPICAL DAY: 1 OR 2

## 2023-04-19 NOTE — PROGRESS NOTES
times daily  Yes Historical Provider, MD   Calcium Carb-Cholecalciferol 600-500 MG-UNIT CAPS Take 1,000 Units by mouth daily Yes Joslyn Boas, MD   leuprolide (LUPRON) 11.25 MG injection Inject 11.25 mg into the muscle every 6 months Yes Historical Provider, MD Velez (Including outside providers/suppliers regularly involved in providing care):   Patient Care Team:  Lyle Lima MD as PCP - Damian Melara MD as PCP - Empaneled Provider  Alonso Pozo MD (Orthopedic Surgery)  Eric Lawson [place] as Physician Assistant (Orthopedic Surgery)  Yanet Menjivar MD (Orthopedic Surgery)     Reviewed and updated this visit:  Tobacco  Allergies  Meds  Med Hx  Surg Hx  Soc Hx  Fam Hx             Lyle Lima MD

## 2023-04-19 NOTE — PATIENT INSTRUCTIONS
medical history including lifestyle, illnesses that may run in your family, and various assessments and screenings as appropriate. After reviewing your medical record and screening and assessments performed today your provider may have ordered immunizations, labs, imaging, and/or referrals for you. A list of these orders (if applicable) as well as your Preventive Care list are included within your After Visit Summary for your review. Other Preventive Recommendations:    A preventive eye exam performed by an eye specialist is recommended every 1-2 years to screen for glaucoma; cataracts, macular degeneration, and other eye disorders. A preventive dental visit is recommended every 6 months. Try to get at least 150 minutes of exercise per week or 10,000 steps per day on a pedometer . Order or download the FREE \"Exercise & Physical Activity: Your Everyday Guide\" from [employer]. Call 2-345.730.7610 or search [employer] online. You need 4360-5050 mg of calcium and 5346-5602 IU of vitamin D per day. It is possible to meet your calcium requirement with diet alone, but a vitamin D supplement is usually necessary to meet this goal.  When exposed to the sun, use a sunscreen that protects against both UVA and UVB radiation with an SPF of 30 or greater. Reapply every 2 to 3 hours or after sweating, drying off with a towel, or swimming. Always wear a seat belt when traveling in a car. Always wear a helmet when riding a bicycle or motorcycle.

## 2023-06-02 ENCOUNTER — HOSPITAL ENCOUNTER (OUTPATIENT)
Age: 68
Discharge: HOME | End: 2023-06-02
Payer: MEDICARE

## 2023-06-02 DIAGNOSIS — E78.5 HYPERLIPIDEMIA, UNSPECIFIED HYPERLIPIDEMIA TYPE: ICD-10-CM

## 2023-06-02 DIAGNOSIS — R73.09 ABNORMAL GLUCOSE: ICD-10-CM

## 2023-06-02 DIAGNOSIS — C61 PROSTATE CANCER (HCC): ICD-10-CM

## 2023-06-02 LAB
ALBUMIN SERPL-MCNC: 4.3 G/DL (ref 3.4–5)
ALBUMIN/GLOB SERPL: 1.5 {RATIO} (ref 1.1–2.2)
ALP SERPL-CCNC: 126 U/L (ref 40–129)
ALT SERPL-CCNC: 23 U/L (ref 10–40)
ANION GAP SERPL CALCULATED.3IONS-SCNC: 13 MMOL/L (ref 3–16)
AST SERPL-CCNC: 22 U/L (ref 15–37)
BASOPHILS # BLD: 0.1 K/UL (ref 0–0.2)
BASOPHILS NFR BLD: 0.7 %
BILIRUB SERPL-MCNC: 0.4 MG/DL (ref 0–1)
BUN SERPL-MCNC: 33 MG/DL (ref 7–20)
CALCIUM SERPL-MCNC: 10.4 MG/DL (ref 8.3–10.6)
CHLORIDE SERPL-SCNC: 105 MMOL/L (ref 99–110)
CHOLEST SERPL-MCNC: 249 MG/DL (ref 0–199)
CO2 SERPL-SCNC: 23 MMOL/L (ref 21–32)
CREAT SERPL-MCNC: 1.1 MG/DL (ref 0.8–1.3)
DEPRECATED RDW RBC AUTO: 13.3 % (ref 12.4–15.4)
EOSINOPHIL # BLD: 0.7 K/UL (ref 0–0.6)
EOSINOPHIL NFR BLD: 9.5 %
GFR SERPLBLD CREATININE-BSD FMLA CKD-EPI: >60 ML/MIN/{1.73_M2}
GLUCOSE SERPL-MCNC: 114 MG/DL (ref 70–99)
HCT VFR BLD AUTO: 43.9 % (ref 40.5–52.5)
HDLC SERPL-MCNC: 70 MG/DL (ref 40–60)
HGB BLD-MCNC: 15 G/DL (ref 13.5–17.5)
LDLC SERPL CALC-MCNC: 160 MG/DL
LYMPHOCYTES # BLD: 2 K/UL (ref 1–5.1)
LYMPHOCYTES NFR BLD: 26.9 %
MCH RBC QN AUTO: 31.9 PG (ref 26–34)
MCHC RBC AUTO-ENTMCNC: 34.1 G/DL (ref 31–36)
MCV RBC AUTO: 93.4 FL (ref 80–100)
MONOCYTES # BLD: 0.7 K/UL (ref 0–1.3)
MONOCYTES NFR BLD: 9.8 %
NEUTROPHILS # BLD: 4 K/UL (ref 1.7–7.7)
NEUTROPHILS NFR BLD: 53.1 %
PLATELET # BLD AUTO: 227 K/UL (ref 135–450)
PMV BLD AUTO: 7.1 FL (ref 5–10.5)
POTASSIUM SERPL-SCNC: 4.2 MMOL/L (ref 3.5–5.1)
PROT SERPL-MCNC: 7.1 G/DL (ref 6.4–8.2)
PSA SERPL DL<=0.01 NG/ML-MCNC: <0.01 NG/ML (ref 0–4)
RBC # BLD AUTO: 4.7 M/UL (ref 4.2–5.9)
SODIUM SERPL-SCNC: 141 MMOL/L (ref 136–145)
TRIGL SERPL-MCNC: 96 MG/DL (ref 0–150)
VLDLC SERPL CALC-MCNC: 19 MG/DL
WBC # BLD AUTO: 7.6 K/UL (ref 4–11)

## 2023-06-02 PROCEDURE — 80053 COMPREHEN METABOLIC PANEL: CPT

## 2023-06-02 PROCEDURE — 36415 COLL VENOUS BLD VENIPUNCTURE: CPT

## 2023-06-02 PROCEDURE — 84403 ASSAY OF TOTAL TESTOSTERONE: CPT

## 2023-06-02 PROCEDURE — 83036 HEMOGLOBIN GLYCOSYLATED A1C: CPT

## 2023-06-02 PROCEDURE — 80061 LIPID PANEL: CPT

## 2023-06-02 PROCEDURE — 85025 COMPLETE CBC W/AUTO DIFF WBC: CPT

## 2023-06-02 PROCEDURE — 84153 ASSAY OF PSA TOTAL: CPT

## 2023-06-03 LAB
EST. AVERAGE GLUCOSE BLD GHB EST-MCNC: 122.6 MG/DL
HBA1C MFR BLD: 5.9 %

## 2023-06-04 DIAGNOSIS — J30.1 SEASONAL ALLERGIC RHINITIS DUE TO POLLEN: ICD-10-CM

## 2023-06-05 DIAGNOSIS — R41.3 MEMORY DISTURBANCE: ICD-10-CM

## 2023-06-05 RX ORDER — LORATADINE AND PSEUDOEPHEDRINE SULFATE 10; 240 MG/1; MG/1
TABLET, FILM COATED, EXTENDED RELEASE ORAL
Qty: 90 TABLET | Refills: 3 | Status: SHIPPED | OUTPATIENT
Start: 2023-06-05

## 2023-06-05 RX ORDER — DONEPEZIL HYDROCHLORIDE 10 MG/1
10 TABLET, FILM COATED ORAL NIGHTLY
Qty: 90 TABLET | Refills: 3 | Status: SHIPPED | OUTPATIENT
Start: 2023-06-05

## 2023-06-06 LAB — TESTOST SERPL-MCNC: <3 NG/DL (ref 220–1000)

## 2023-08-13 ENCOUNTER — PATIENT MESSAGE (OUTPATIENT)
Dept: FAMILY MEDICINE CLINIC | Age: 68
End: 2023-08-13

## 2023-08-13 DIAGNOSIS — R41.3 MEMORY DISTURBANCE: ICD-10-CM

## 2023-08-13 DIAGNOSIS — M25.50 PAIN IN JOINT INVOLVING MULTIPLE SITES: ICD-10-CM

## 2023-08-14 RX ORDER — DONEPEZIL HYDROCHLORIDE 10 MG/1
10 TABLET, FILM COATED ORAL NIGHTLY
Qty: 90 TABLET | Refills: 3 | Status: SHIPPED | OUTPATIENT
Start: 2023-08-14

## 2023-08-14 RX ORDER — DICLOFENAC SODIUM 75 MG/1
75 TABLET, DELAYED RELEASE ORAL 2 TIMES DAILY
Qty: 180 TABLET | Refills: 1 | Status: SHIPPED | OUTPATIENT
Start: 2023-08-14 | End: 2023-11-12

## 2023-08-14 NOTE — TELEPHONE ENCOUNTER
From: Boy Bunch  To: Dr. Sondra Barton  Sent: 8/13/2023 8:43 PM EDT  Subject: Refill of Diclofenac    I have requested refill through [facility]. I want to stay on Diclofenac, so a 90 day prescription instead of 30 day would be preferred.    Thank you

## 2023-08-16 DIAGNOSIS — I10 ESSENTIAL HYPERTENSION: ICD-10-CM

## 2023-08-16 RX ORDER — HYDROCHLOROTHIAZIDE 12.5 MG/1
12.5 CAPSULE, GELATIN COATED ORAL EVERY MORNING
Qty: 90 CAPSULE | Refills: 3 | Status: SHIPPED | OUTPATIENT
Start: 2023-08-16

## 2023-08-16 RX ORDER — HYDROCHLOROTHIAZIDE 12.5 MG/1
CAPSULE, GELATIN COATED ORAL
Qty: 90 CAPSULE | Refills: 3 | OUTPATIENT
Start: 2023-08-16

## 2023-08-16 RX ORDER — HYDROCHLOROTHIAZIDE 12.5 MG/1
CAPSULE, GELATIN COATED ORAL
Qty: 90 CAPSULE | Refills: 0 | OUTPATIENT
Start: 2023-08-16

## 2023-12-11 ENCOUNTER — HOSPITAL ENCOUNTER (OUTPATIENT)
Age: 68
Discharge: HOME | End: 2023-12-11
Payer: MEDICARE

## 2023-12-11 PROCEDURE — 36415 COLL VENOUS BLD VENIPUNCTURE: CPT

## 2023-12-11 PROCEDURE — 84153 ASSAY OF PSA TOTAL: CPT

## 2023-12-12 ENCOUNTER — HOSPITAL ENCOUNTER (OUTPATIENT)
Dept: GENERAL RADIOLOGY | Age: 68
Discharge: HOME | End: 2023-12-12
Attending: UROLOGY
Payer: MEDICARE

## 2023-12-12 DIAGNOSIS — M81.8 OTHER OSTEOPOROSIS WITHOUT CURRENT PATHOLOGICAL FRACTURE: ICD-10-CM

## 2023-12-12 DIAGNOSIS — C61 MALIGNANT NEOPLASM OF PROSTATE (HCC): ICD-10-CM

## 2023-12-12 LAB — PSA SERPL DL<=0.01 NG/ML-MCNC: <0.01 NG/ML (ref 0–4)

## 2023-12-12 PROCEDURE — 77080 DXA BONE DENSITY AXIAL: CPT

## 2024-02-12 DIAGNOSIS — M25.50 PAIN IN JOINT INVOLVING MULTIPLE SITES: ICD-10-CM

## 2024-02-12 RX ORDER — DICLOFENAC SODIUM 75 MG/1
75 TABLET, DELAYED RELEASE ORAL 2 TIMES DAILY
Qty: 180 TABLET | Refills: 1 | Status: SHIPPED | OUTPATIENT
Start: 2024-02-12 | End: 2024-05-12

## 2024-04-20 SDOH — ECONOMIC STABILITY: INCOME INSECURITY: HOW HARD IS IT FOR YOU TO PAY FOR THE VERY BASICS LIKE FOOD, HOUSING, MEDICAL CARE, AND HEATING?: NOT HARD AT ALL

## 2024-04-20 SDOH — ECONOMIC STABILITY: FOOD INSECURITY: WITHIN THE PAST 12 MONTHS, THE FOOD YOU BOUGHT JUST DIDN'T LAST AND YOU DIDN'T HAVE MONEY TO GET MORE.: NEVER TRUE

## 2024-04-20 SDOH — ECONOMIC STABILITY: FOOD INSECURITY: WITHIN THE PAST 12 MONTHS, YOU WORRIED THAT YOUR FOOD WOULD RUN OUT BEFORE YOU GOT MONEY TO BUY MORE.: NEVER TRUE

## 2024-04-20 SDOH — ECONOMIC STABILITY: TRANSPORTATION INSECURITY
IN THE PAST 12 MONTHS, HAS LACK OF TRANSPORTATION KEPT YOU FROM MEETINGS, WORK, OR FROM GETTING THINGS NEEDED FOR DAILY LIVING?: NO

## 2024-04-20 SDOH — HEALTH STABILITY: PHYSICAL HEALTH
ON AVERAGE, HOW MANY DAYS PER WEEK DO YOU ENGAGE IN MODERATE TO STRENUOUS EXERCISE (LIKE A BRISK WALK)?: PATIENT DECLINED

## 2024-04-20 ASSESSMENT — LIFESTYLE VARIABLES
HOW MANY STANDARD DRINKS CONTAINING ALCOHOL DO YOU HAVE ON A TYPICAL DAY: 98
HOW OFTEN DO YOU HAVE SIX OR MORE DRINKS ON ONE OCCASION: 1
HOW OFTEN DO YOU HAVE A DRINK CONTAINING ALCOHOL: PATIENT DECLINED
HOW OFTEN DO YOU HAVE A DRINK CONTAINING ALCOHOL: 98
HOW MANY STANDARD DRINKS CONTAINING ALCOHOL DO YOU HAVE ON A TYPICAL DAY: PATIENT DECLINED

## 2024-04-20 ASSESSMENT — PATIENT HEALTH QUESTIONNAIRE - PHQ9
SUM OF ALL RESPONSES TO PHQ QUESTIONS 1-9: 0
SUM OF ALL RESPONSES TO PHQ QUESTIONS 1-9: 0
SUM OF ALL RESPONSES TO PHQ9 QUESTIONS 1 & 2: 0
SUM OF ALL RESPONSES TO PHQ QUESTIONS 1-9: 0
2. FEELING DOWN, DEPRESSED OR HOPELESS: NOT AT ALL
SUM OF ALL RESPONSES TO PHQ QUESTIONS 1-9: 0
1. LITTLE INTEREST OR PLEASURE IN DOING THINGS: NOT AT ALL

## 2024-04-22 ENCOUNTER — OFFICE VISIT (OUTPATIENT)
Dept: FAMILY MEDICINE CLINIC | Age: 69
End: 2024-04-22
Payer: MEDICARE

## 2024-04-22 VITALS
SYSTOLIC BLOOD PRESSURE: 136 MMHG | HEIGHT: 63 IN | WEIGHT: 195 LBS | BODY MASS INDEX: 34.55 KG/M2 | HEART RATE: 58 BPM | DIASTOLIC BLOOD PRESSURE: 86 MMHG | OXYGEN SATURATION: 93 %

## 2024-04-22 DIAGNOSIS — Z23 NEED FOR VACCINATION FOR PNEUMOCOCCUS: ICD-10-CM

## 2024-04-22 DIAGNOSIS — Z00.00 MEDICARE ANNUAL WELLNESS VISIT, SUBSEQUENT: Primary | ICD-10-CM

## 2024-04-22 DIAGNOSIS — I10 ESSENTIAL HYPERTENSION: ICD-10-CM

## 2024-04-22 DIAGNOSIS — Z85.820 HISTORY OF MELANOMA: ICD-10-CM

## 2024-04-22 DIAGNOSIS — R73.09 ABNORMAL GLUCOSE: ICD-10-CM

## 2024-04-22 DIAGNOSIS — C61 MALIGNANT NEOPLASM OF PROSTATE (HCC): ICD-10-CM

## 2024-04-22 DIAGNOSIS — E78.5 HYPERLIPIDEMIA, UNSPECIFIED HYPERLIPIDEMIA TYPE: ICD-10-CM

## 2024-04-22 DIAGNOSIS — R41.3 MEMORY DISTURBANCE: ICD-10-CM

## 2024-04-22 PROCEDURE — 99214 OFFICE O/P EST MOD 30 MIN: CPT | Performed by: FAMILY MEDICINE

## 2024-04-22 PROCEDURE — 3017F COLORECTAL CA SCREEN DOC REV: CPT | Performed by: FAMILY MEDICINE

## 2024-04-22 PROCEDURE — 1123F ACP DISCUSS/DSCN MKR DOCD: CPT | Performed by: FAMILY MEDICINE

## 2024-04-22 PROCEDURE — G8427 DOCREV CUR MEDS BY ELIG CLIN: HCPCS | Performed by: FAMILY MEDICINE

## 2024-04-22 PROCEDURE — 1036F TOBACCO NON-USER: CPT | Performed by: FAMILY MEDICINE

## 2024-04-22 PROCEDURE — G0439 PPPS, SUBSEQ VISIT: HCPCS | Performed by: FAMILY MEDICINE

## 2024-04-22 PROCEDURE — G0009 ADMIN PNEUMOCOCCAL VACCINE: HCPCS | Performed by: FAMILY MEDICINE

## 2024-04-22 PROCEDURE — 3079F DIAST BP 80-89 MM HG: CPT | Performed by: FAMILY MEDICINE

## 2024-04-22 PROCEDURE — 3075F SYST BP GE 130 - 139MM HG: CPT | Performed by: FAMILY MEDICINE

## 2024-04-22 PROCEDURE — 90677 PCV20 VACCINE IM: CPT | Performed by: FAMILY MEDICINE

## 2024-04-22 PROCEDURE — G8417 CALC BMI ABV UP PARAM F/U: HCPCS | Performed by: FAMILY MEDICINE

## 2024-04-22 ASSESSMENT — ENCOUNTER SYMPTOMS
DIARRHEA: 0
BLOOD IN STOOL: 0
CONSTIPATION: 0
SHORTNESS OF BREATH: 0

## 2024-04-22 NOTE — PROGRESS NOTES
Medicare Annual Wellness Visit    Shaquille Brunner is here for Medicare AWV, Hypertension, and Hyperlipidemia    Assessment & Plan   Medicare annual wellness visit, subsequent  Discussed dentist appointment.  Patient working on weight loss.  Otherwise her other issues are fairly stable.  Prevnar today    Malignant neoplasm of prostate (HCC)  -     CBC with Auto Differential; Future  -     PSA, Prostatic Specific Antigen ([place]); Future  -     Testosterone, free, total; Future  Patient continues to get Lupron injections.  He is getting Prolia as well.  Follow-up with urology as planned.  PSA has typically been appropriately low and testosterone suppressed.  Repeat labs this summer    Need for vaccination for pneumococcus  -     Pneumococcal, PCV20, PREVNAR 20, (age 6w+), IM, PF    Hyperlipidemia, unspecified hyperlipidemia type  -     Lipid Panel; Future  -     Comprehensive Metabolic Panel; Future  Repeat labs with next draw.  Patient has been using red yeast rice.  He is hoping to avoid cholesterol medication.  Working on diet    Essential hypertension  The current medical regimen is effective;  continue present plan and medications.    Abnormal glucose  -     Hemoglobin A1C; Future  Repeat labs with next draw    Memory disturbance  Some mild memory issues that appear stable.  Significant family history of dementia.  Patient continues to do fairly well on donepezil    History of melanoma  Follow-up with dermatology recommended.  Patient considering a new dermatologist.    Small area of redness that is slightly raised to the left side of the scalp.  Improving.  Suspect was a mild superficial skin infection.  Call if fails to continue to improve    Recommendations for Preventive Services Due: see orders and patient instructions/AVS.  Recommended screening schedule for the next 5-10 years is provided to the patient in written form: see Patient Instructions/AVS.     No follow-ups on file.     Subjective

## 2024-04-22 NOTE — PATIENT INSTRUCTIONS
Press firmly, and move the brush in small circles over the surface of the teeth.  Be careful not to brush too hard. Vigorous brushing can make the gums pull away from the teeth and can scratch the tooth enamel.  Brush all surfaces of the teeth, on the tongue side and on the cheek side. Pay special attention to the front teeth and all surfaces of the back teeth.  Brush chewing surfaces with short back-and-forth strokes.  After you've finished, help the person rinse the remaining toothpaste from their mouth.  Where can you learn more?  Go to https://www.[employer].net/patientEd and enter F944 to learn more about \"Learning About Dental Care for Older Adults.\"  Current as of: August 6, 2023               Content Version: 14.0  © 2006-2024 [employer].   Care instructions adapted under license by [employer]. If you have questions about a medical condition or this instruction, always ask your healthcare professional. [employer] disclaims any warranty or liability for your use of this information.           Starting a Weight Loss Plan: Care Instructions  Overview     It can be a challenge to lose weight. But your doctor can help you make a weight-loss plan that meets your needs.  You don't have to make a lot of big changes at once. A better idea might be to focus on small changes and stick with them. When those changes become habit, you can add a few more changes.  Some people find it helpful to take an exercise or nutrition class. If you have questions, ask your doctor about seeing a registered dietitian or an exercise specialist. You might also think about joining a weight-loss support group.  If you're not ready to make changes right now, try to pick a date in the future. Then make an appointment with your doctor to talk about when and how you'll get started with a plan.  Follow-up care is a key part of your treatment and safety. Be sure to make and go to all appointments, and call your

## 2024-05-27 ENCOUNTER — PATIENT MESSAGE (OUTPATIENT)
Dept: FAMILY MEDICINE CLINIC | Age: 69
End: 2024-05-27

## 2024-05-27 DIAGNOSIS — J30.1 SEASONAL ALLERGIC RHINITIS DUE TO POLLEN: ICD-10-CM

## 2024-05-28 RX ORDER — LORATADINE AND PSEUDOEPHEDRINE SULFATE 10; 240 MG/1; MG/1
TABLET, FILM COATED, EXTENDED RELEASE ORAL
Qty: 90 TABLET | Refills: 3 | Status: SHIPPED | OUTPATIENT
Start: 2024-05-28

## 2024-05-28 NOTE — TELEPHONE ENCOUNTER
From: Shaquille Brunner  To: Dr. Stevie Rodriguez  Sent: 5/27/2024 8:59 AM EDT  Subject: prescription renewal    I need my prescription for loratadine-D, 24 hr renewed.  please send to [facility].    Thank you

## 2024-06-19 ENCOUNTER — HOSPITAL ENCOUNTER (OUTPATIENT)
Age: 69
Discharge: HOME | End: 2024-06-19
Payer: MEDICARE

## 2024-06-19 DIAGNOSIS — C61 MALIGNANT NEOPLASM OF PROSTATE (HCC): ICD-10-CM

## 2024-06-19 DIAGNOSIS — R73.09 ABNORMAL GLUCOSE: ICD-10-CM

## 2024-06-19 DIAGNOSIS — E78.5 HYPERLIPIDEMIA, UNSPECIFIED HYPERLIPIDEMIA TYPE: ICD-10-CM

## 2024-06-19 LAB
ALBUMIN SERPL-MCNC: 3.8 G/DL (ref 3.4–5)
ALBUMIN/GLOB SERPL: 1.3 {RATIO} (ref 1.1–2.2)
ALP SERPL-CCNC: 87 U/L (ref 40–129)
ALT SERPL-CCNC: 29 U/L (ref 10–40)
ANION GAP SERPL CALCULATED.3IONS-SCNC: 13 MMOL/L (ref 3–16)
AST SERPL-CCNC: 28 U/L (ref 15–37)
BASOPHILS # BLD: 0.1 K/UL (ref 0–0.2)
BASOPHILS NFR BLD: 0.9 %
BILIRUB SERPL-MCNC: 0.3 MG/DL (ref 0–1)
BUN SERPL-MCNC: 34 MG/DL (ref 7–20)
CALCIUM SERPL-MCNC: 9.5 MG/DL (ref 8.3–10.6)
CHLORIDE SERPL-SCNC: 102 MMOL/L (ref 99–110)
CO2 SERPL-SCNC: 21 MMOL/L (ref 21–32)
CREAT SERPL-MCNC: 1.3 MG/DL (ref 0.8–1.3)
DEPRECATED RDW RBC AUTO: 13.8 % (ref 12.4–15.4)
EOSINOPHIL # BLD: 0.9 K/UL (ref 0–0.6)
EOSINOPHIL NFR BLD: 9.4 %
GFR SERPLBLD CREATININE-BSD FMLA CKD-EPI: 59 ML/MIN/{1.73_M2}
GLUCOSE SERPL-MCNC: 118 MG/DL (ref 70–99)
HCT VFR BLD AUTO: 39.4 % (ref 40.5–52.5)
HGB BLD-MCNC: 13.5 G/DL (ref 13.5–17.5)
LYMPHOCYTES # BLD: 2.6 K/UL (ref 1–5.1)
LYMPHOCYTES NFR BLD: 28.8 %
MCH RBC QN AUTO: 31.8 PG (ref 26–34)
MCHC RBC AUTO-ENTMCNC: 34.2 G/DL (ref 31–36)
MCV RBC AUTO: 93.1 FL (ref 80–100)
MONOCYTES # BLD: 0.9 K/UL (ref 0–1.3)
MONOCYTES NFR BLD: 9.8 %
NEUTROPHILS # BLD: 4.6 K/UL (ref 1.7–7.7)
NEUTROPHILS NFR BLD: 51.1 %
PLATELET # BLD AUTO: 254 K/UL (ref 135–450)
PMV BLD AUTO: 6.9 FL (ref 5–10.5)
POTASSIUM SERPL-SCNC: 4.1 MMOL/L (ref 3.5–5.1)
PROT SERPL-MCNC: 6.7 G/DL (ref 6.4–8.2)
PSA SERPL DL<=0.01 NG/ML-MCNC: <0.01 NG/ML (ref 0–4)
RBC # BLD AUTO: 4.23 M/UL (ref 4.2–5.9)
SODIUM SERPL-SCNC: 136 MMOL/L (ref 136–145)
WBC # BLD AUTO: 9.1 K/UL (ref 4–11)

## 2024-06-19 PROCEDURE — 85025 COMPLETE CBC W/AUTO DIFF WBC: CPT

## 2024-06-19 PROCEDURE — 84153 ASSAY OF PSA TOTAL: CPT

## 2024-06-19 PROCEDURE — 36415 COLL VENOUS BLD VENIPUNCTURE: CPT

## 2024-06-19 PROCEDURE — 80053 COMPREHEN METABOLIC PANEL: CPT

## 2024-06-19 PROCEDURE — 84403 ASSAY OF TOTAL TESTOSTERONE: CPT

## 2024-06-19 PROCEDURE — 84270 ASSAY OF SEX HORMONE GLOBUL: CPT

## 2024-06-19 PROCEDURE — 83036 HEMOGLOBIN GLYCOSYLATED A1C: CPT

## 2024-06-19 PROCEDURE — 80061 LIPID PANEL: CPT

## 2024-06-20 LAB
CHOLEST SERPL-MCNC: 218 MG/DL (ref 0–199)
EST. AVERAGE GLUCOSE BLD GHB EST-MCNC: 125.5 MG/DL
HBA1C MFR BLD: 6 %
HDLC SERPL-MCNC: 74 MG/DL (ref 40–60)
LDLC SERPL CALC-MCNC: 124 MG/DL
TRIGL SERPL-MCNC: 102 MG/DL (ref 0–150)
VLDLC SERPL CALC-MCNC: 20 MG/DL

## 2024-06-20 NOTE — RESULT ENCOUNTER NOTE
Cholesterol overall significantly better than last check.  Glucose 118.  Renal function slightly worse than last check, but still better than what it was 2 years ago.  Continue to work on staying well-hydrated.  CBC essentially normal.  Hemoglobin A1c similar to prior.  Testosterone pending

## 2024-06-22 LAB
SHBG SERPL-SCNC: 53 NMOL/L (ref 19–76)
TESTOST FREE SERPL-MCNC: <1 PG/ML (ref 47–244)
TESTOST SERPL-MCNC: 3 NG/DL (ref 193–740)

## 2024-08-12 DIAGNOSIS — M25.50 PAIN IN JOINT INVOLVING MULTIPLE SITES: ICD-10-CM

## 2024-08-12 DIAGNOSIS — I10 ESSENTIAL HYPERTENSION: ICD-10-CM

## 2024-08-12 RX ORDER — HYDROCHLOROTHIAZIDE 12.5 MG/1
12.5 CAPSULE, GELATIN COATED ORAL EVERY MORNING
Qty: 90 CAPSULE | Refills: 3 | Status: SHIPPED | OUTPATIENT
Start: 2024-08-12

## 2024-08-12 RX ORDER — DICLOFENAC SODIUM 75 MG/1
75 TABLET, DELAYED RELEASE ORAL 2 TIMES DAILY
Qty: 180 TABLET | Refills: 3 | Status: SHIPPED | OUTPATIENT
Start: 2024-08-12 | End: 2025-08-07

## 2024-09-30 ENCOUNTER — PATIENT MESSAGE (OUTPATIENT)
Dept: FAMILY MEDICINE CLINIC | Age: 69
End: 2024-09-30

## 2024-09-30 DIAGNOSIS — R41.3 MEMORY DISTURBANCE: ICD-10-CM

## 2024-09-30 RX ORDER — DONEPEZIL HYDROCHLORIDE 10 MG/1
10 TABLET, FILM COATED ORAL NIGHTLY
Qty: 90 TABLET | Refills: 0 | Status: SHIPPED | OUTPATIENT
Start: 2024-09-30

## 2024-12-16 ENCOUNTER — HOSPITAL ENCOUNTER (OUTPATIENT)
Age: 69
Discharge: HOME | End: 2024-12-16
Payer: MEDICARE

## 2024-12-16 PROCEDURE — 84153 ASSAY OF PSA TOTAL: CPT

## 2024-12-16 PROCEDURE — 84403 ASSAY OF TOTAL TESTOSTERONE: CPT

## 2024-12-17 LAB — PSA SERPL DL<=0.01 NG/ML-MCNC: <0.02 NG/ML (ref 0–4)

## 2024-12-19 LAB — TESTOST SERPL-MCNC: 3 NG/DL (ref 193–740)

## 2024-12-23 ENCOUNTER — PATIENT MESSAGE (OUTPATIENT)
Dept: FAMILY MEDICINE CLINIC | Age: 69
End: 2024-12-23

## 2024-12-23 DIAGNOSIS — R41.3 MEMORY DISTURBANCE: ICD-10-CM

## 2024-12-23 RX ORDER — DONEPEZIL HYDROCHLORIDE 10 MG/1
10 TABLET, FILM COATED ORAL NIGHTLY
Qty: 90 TABLET | Refills: 3 | Status: SHIPPED | OUTPATIENT
Start: 2024-12-23

## 2025-01-10 ENCOUNTER — HOSPITAL ENCOUNTER (OUTPATIENT)
Dept: CT IMAGING | Age: 70
Discharge: HOME | End: 2025-01-10
Attending: UROLOGY
Payer: MEDICARE

## 2025-01-10 ENCOUNTER — HOSPITAL ENCOUNTER (OUTPATIENT)
Age: 70
Discharge: HOME | End: 2025-01-10
Attending: UROLOGY
Payer: MEDICARE

## 2025-01-10 DIAGNOSIS — R35.1 NOCTURIA: ICD-10-CM

## 2025-01-10 DIAGNOSIS — M85.80 AGE-RELATED BONE LOSS: ICD-10-CM

## 2025-01-10 DIAGNOSIS — M81.8: ICD-10-CM

## 2025-01-10 DIAGNOSIS — N52.31 ERECTILE DYSFUNCTION FOLLOWING RADICAL PROSTATECTOMY: ICD-10-CM

## 2025-01-10 DIAGNOSIS — N20.0 CALCULUS OF KIDNEY: ICD-10-CM

## 2025-01-10 DIAGNOSIS — C61 MALIGNANT NEOPLASM OF PROSTATE (HCC): ICD-10-CM

## 2025-01-10 PROCEDURE — 6360000004 HC RX CONTRAST MEDICATION: Performed by: UROLOGY

## 2025-01-10 PROCEDURE — 71260 CT THORAX DX C+: CPT

## 2025-01-10 RX ORDER — IOPAMIDOL 755 MG/ML
75 INJECTION, SOLUTION INTRAVASCULAR
Status: COMPLETED | OUTPATIENT
Start: 2025-01-10 | End: 2025-01-10

## 2025-01-10 RX ADMIN — IOPAMIDOL 75 ML: 755 INJECTION, SOLUTION INTRAVENOUS at 14:30

## 2025-04-25 SDOH — ECONOMIC STABILITY: FOOD INSECURITY: WITHIN THE PAST 12 MONTHS, THE FOOD YOU BOUGHT JUST DIDN'T LAST AND YOU DIDN'T HAVE MONEY TO GET MORE.: NEVER TRUE

## 2025-04-25 SDOH — ECONOMIC STABILITY: TRANSPORTATION INSECURITY
IN THE PAST 12 MONTHS, HAS THE LACK OF TRANSPORTATION KEPT YOU FROM MEDICAL APPOINTMENTS OR FROM GETTING MEDICATIONS?: NO

## 2025-04-25 SDOH — HEALTH STABILITY: PHYSICAL HEALTH: ON AVERAGE, HOW MANY DAYS PER WEEK DO YOU ENGAGE IN MODERATE TO STRENUOUS EXERCISE (LIKE A BRISK WALK)?: 0 DAYS

## 2025-04-25 SDOH — ECONOMIC STABILITY: FOOD INSECURITY: WITHIN THE PAST 12 MONTHS, YOU WORRIED THAT YOUR FOOD WOULD RUN OUT BEFORE YOU GOT MONEY TO BUY MORE.: NEVER TRUE

## 2025-04-25 SDOH — HEALTH STABILITY: PHYSICAL HEALTH: ON AVERAGE, HOW MANY MINUTES DO YOU ENGAGE IN EXERCISE AT THIS LEVEL?: 10 MIN

## 2025-04-25 ASSESSMENT — PATIENT HEALTH QUESTIONNAIRE - PHQ9
SUM OF ALL RESPONSES TO PHQ QUESTIONS 1-9: 0
2. FEELING DOWN, DEPRESSED OR HOPELESS: NOT AT ALL
SUM OF ALL RESPONSES TO PHQ QUESTIONS 1-9: 0
1. LITTLE INTEREST OR PLEASURE IN DOING THINGS: NOT AT ALL
SUM OF ALL RESPONSES TO PHQ QUESTIONS 1-9: 0
SUM OF ALL RESPONSES TO PHQ QUESTIONS 1-9: 0

## 2025-04-25 ASSESSMENT — LIFESTYLE VARIABLES
HOW OFTEN DURING THE LAST YEAR HAVE YOU HAD A FEELING OF GUILT OR REMORSE AFTER DRINKING: NEVER
HOW OFTEN DURING THE LAST YEAR HAVE YOU BEEN UNABLE TO REMEMBER WHAT HAPPENED THE NIGHT BEFORE BECAUSE YOU HAD BEEN DRINKING: NEVER
HOW OFTEN DURING THE LAST YEAR HAVE YOU FAILED TO DO WHAT WAS NORMALLY EXPECTED FROM YOU BECAUSE OF DRINKING: NEVER
HAS A RELATIVE, FRIEND, DOCTOR, OR ANOTHER HEALTH PROFESSIONAL EXPRESSED CONCERN ABOUT YOUR DRINKING OR SUGGESTED YOU CUT DOWN: NO
HAS A RELATIVE, FRIEND, DOCTOR, OR ANOTHER HEALTH PROFESSIONAL EXPRESSED CONCERN ABOUT YOUR DRINKING OR SUGGESTED YOU CUT DOWN: NO
HOW OFTEN DURING THE LAST YEAR HAVE YOU BEEN UNABLE TO REMEMBER WHAT HAPPENED THE NIGHT BEFORE BECAUSE YOU HAD BEEN DRINKING: NEVER
HOW OFTEN DURING THE LAST YEAR HAVE YOU NEEDED AN ALCOHOLIC DRINK FIRST THING IN THE MORNING TO GET YOURSELF GOING AFTER A NIGHT OF HEAVY DRINKING: NEVER
HOW OFTEN DURING THE LAST YEAR HAVE YOU FOUND THAT YOU WERE NOT ABLE TO STOP DRINKING ONCE YOU HAD STARTED: NEVER
HOW OFTEN DO YOU HAVE A DRINK CONTAINING ALCOHOL: 5
HAVE YOU OR SOMEONE ELSE BEEN INJURED AS A RESULT OF YOUR DRINKING: NO
HAVE YOU OR SOMEONE ELSE BEEN INJURED AS A RESULT OF YOUR DRINKING: NO
HOW MANY STANDARD DRINKS CONTAINING ALCOHOL DO YOU HAVE ON A TYPICAL DAY: 1
HOW OFTEN DO YOU HAVE A DRINK CONTAINING ALCOHOL: 4 OR MORE TIMES A WEEK
HOW OFTEN DURING THE LAST YEAR HAVE YOU HAD A FEELING OF GUILT OR REMORSE AFTER DRINKING: NEVER
HOW OFTEN DO YOU HAVE SIX OR MORE DRINKS ON ONE OCCASION: 1
HOW MANY STANDARD DRINKS CONTAINING ALCOHOL DO YOU HAVE ON A TYPICAL DAY: 1 OR 2
HOW OFTEN DURING THE LAST YEAR HAVE YOU FOUND THAT YOU WERE NOT ABLE TO STOP DRINKING ONCE YOU HAD STARTED: NEVER
HOW OFTEN DURING THE LAST YEAR HAVE YOU NEEDED AN ALCOHOLIC DRINK FIRST THING IN THE MORNING TO GET YOURSELF GOING AFTER A NIGHT OF HEAVY DRINKING: NEVER
HOW OFTEN DURING THE LAST YEAR HAVE YOU FAILED TO DO WHAT WAS NORMALLY EXPECTED FROM YOU BECAUSE OF DRINKING: NEVER

## 2025-04-28 ENCOUNTER — OFFICE VISIT (OUTPATIENT)
Dept: FAMILY MEDICINE CLINIC | Age: 70
End: 2025-04-28
Payer: MEDICARE

## 2025-04-28 VITALS
HEIGHT: 63 IN | OXYGEN SATURATION: 98 % | HEART RATE: 68 BPM | BODY MASS INDEX: 34.02 KG/M2 | DIASTOLIC BLOOD PRESSURE: 86 MMHG | SYSTOLIC BLOOD PRESSURE: 136 MMHG | WEIGHT: 192 LBS

## 2025-04-28 DIAGNOSIS — Z85.820 HISTORY OF MELANOMA: ICD-10-CM

## 2025-04-28 DIAGNOSIS — R41.3 MEMORY DISTURBANCE: ICD-10-CM

## 2025-04-28 DIAGNOSIS — C61 PROSTATE CANCER (HCC): ICD-10-CM

## 2025-04-28 DIAGNOSIS — E78.5 HYPERLIPIDEMIA, UNSPECIFIED HYPERLIPIDEMIA TYPE: ICD-10-CM

## 2025-04-28 DIAGNOSIS — R73.09 ABNORMAL GLUCOSE: ICD-10-CM

## 2025-04-28 DIAGNOSIS — Z00.00 MEDICARE ANNUAL WELLNESS VISIT, SUBSEQUENT: Primary | ICD-10-CM

## 2025-04-28 DIAGNOSIS — I10 ESSENTIAL HYPERTENSION: ICD-10-CM

## 2025-04-28 PROCEDURE — G8427 DOCREV CUR MEDS BY ELIG CLIN: HCPCS | Performed by: FAMILY MEDICINE

## 2025-04-28 PROCEDURE — 3079F DIAST BP 80-89 MM HG: CPT | Performed by: FAMILY MEDICINE

## 2025-04-28 PROCEDURE — 1159F MED LIST DOCD IN RCRD: CPT | Performed by: FAMILY MEDICINE

## 2025-04-28 PROCEDURE — 3017F COLORECTAL CA SCREEN DOC REV: CPT | Performed by: FAMILY MEDICINE

## 2025-04-28 PROCEDURE — G8417 CALC BMI ABV UP PARAM F/U: HCPCS | Performed by: FAMILY MEDICINE

## 2025-04-28 PROCEDURE — 3075F SYST BP GE 130 - 139MM HG: CPT | Performed by: FAMILY MEDICINE

## 2025-04-28 PROCEDURE — 99214 OFFICE O/P EST MOD 30 MIN: CPT | Performed by: FAMILY MEDICINE

## 2025-04-28 PROCEDURE — 1036F TOBACCO NON-USER: CPT | Performed by: FAMILY MEDICINE

## 2025-04-28 PROCEDURE — 1123F ACP DISCUSS/DSCN MKR DOCD: CPT | Performed by: FAMILY MEDICINE

## 2025-04-28 PROCEDURE — G0439 PPPS, SUBSEQ VISIT: HCPCS | Performed by: FAMILY MEDICINE

## 2025-04-28 ASSESSMENT — ENCOUNTER SYMPTOMS
BLOOD IN STOOL: 0
CONSTIPATION: 0
DIARRHEA: 0
SHORTNESS OF BREATH: 0

## 2025-04-28 NOTE — PATIENT INSTRUCTIONS
instruction, always ask your healthcare professional. [employer], disclaims any warranty or liability for your use of this information.         Learning About Vision Tests  What are vision tests?     The four most common vision tests are visual acuity tests, refraction, visual field tests, and color vision tests.  Visual acuity (sharpness) tests  These tests are used:  To see if you need glasses or contact lenses.  To monitor an eye problem.  To check an eye injury.  Visual acuity tests are done as part of routine exams. You may also have this test when you get your 's license or apply for some types of jobs.  Visual field tests  These tests are used:  To check for vision loss in any area of your range of vision.  To screen for certain eye diseases.  To look for nerve damage after a stroke, head injury, or other problem that could reduce blood flow to the brain.  Refraction and color tests  A refraction test is done to find the right prescription for glasses and contact lenses.  A color vision test is done to check for color blindness.  Color vision is often tested as part of a routine exam. You may also have this test when you apply for a job where recognizing different colors is important, such as , electronics, or the .  How are vision tests done?  Visual acuity test   You cover one eye at a time.  You read aloud from a wall chart across the room.  You read aloud from a small card that you hold in your hand.  Refraction   You look into a special device.  The device puts lenses of different strengths in front of each eye to see how strong your glasses or contact lenses need to be.  Visual field tests   Your doctor may have you look through special machines.  Or your doctor may simply have you stare straight ahead while they move a finger into and out of your field of vision.  Color vision test   You look at pieces of printed test patterns in various colors. You say what

## 2025-04-28 NOTE — PROGRESS NOTES
Pulmonary:      Effort: Pulmonary effort is normal.      Breath sounds: Normal breath sounds.   Abdominal:      Palpations: Abdomen is soft.      Tenderness: There is no abdominal tenderness.   Skin:     General: Skin is warm and dry.   Neurological:      General: No focal deficit present.      Mental Status: He is alert and oriented to person, place, and time.   Psychiatric:         Mood and Affect: Mood normal.         Behavior: Behavior normal.                  Allergies   Allergen Reactions    Cephalexin Hives    Lisinopril      angio edema      Prior to Visit Medications    Medication Sig Taking? Authorizing Provider   ELDERBERRY PO Take by mouth Yes Pat Wyatt MD   Glucosamine HCl (GLUCOSAMINE PO) Take by mouth Yes Pat Wyatt MD   donepezil (ARICEPT) 10 MG tablet Take 1 tablet by mouth nightly Yes Stevie Rodriguez MD   hydroCHLOROthiazide 12.5 MG capsule Take 1 capsule by mouth every morning Yes Stevie Rodriguez MD   diclofenac (VOLTAREN) 75 MG EC tablet Take 1 tablet by mouth 2 times daily Yes Stevie Rodriguez MD   loratadine-pseudoephedrine (ALLERGY RELIEF D-24)  MG per extended release tablet TAKE ONE TABLET BY MOUTH DAILY Yes Kirk Elizondo APRN - CNP   Ascorbic Acid (VITAMIN C) 1000 MG tablet Take 2 tablets by mouth daily Yes Pat Wyatt MD   Denosumab (PROLIA SC) Inject into the skin every 6 months  Yes Pat Wyatt MD   Red Yeast Rice 600 MG TABS Take 600 mg by mouth 2 times daily Yes Pat Wyatt MD   Turmeric 500 MG TABS Take 500 mg by mouth 2 times daily Yes Pat Wyatt MD   Cinnamon 500 MG CAPS Take 1 capsule by mouth 2 times daily Yes Pat Wyatt MD   Omega-3 Fatty Acids (FISH OIL) 1000 MG CAPS Take 1 capsule by mouth 2 times daily Yes Pat Wyatt MD   POTASSIUM CHLORIDE PO Take by mouth daily 99 mg takes 1/2 tab unless having cramps then takes whole tab. Yes Pat Wyatt MD   fluticasone (FLONASE)

## 2025-06-30 ENCOUNTER — HOSPITAL ENCOUNTER (OUTPATIENT)
Dept: LAB | Age: 70
Discharge: HOME | End: 2025-06-30
Payer: MEDICARE

## 2025-06-30 DIAGNOSIS — E78.5 HYPERLIPIDEMIA, UNSPECIFIED HYPERLIPIDEMIA TYPE: ICD-10-CM

## 2025-06-30 DIAGNOSIS — C61 PROSTATE CANCER (HCC): ICD-10-CM

## 2025-06-30 DIAGNOSIS — R73.09 ABNORMAL GLUCOSE: ICD-10-CM

## 2025-06-30 LAB
ALBUMIN SERPL-MCNC: 4 G/DL (ref 3.4–5)
ALBUMIN/GLOB SERPL: 1.4 {RATIO} (ref 1.1–2.2)
ALP SERPL-CCNC: 78 U/L (ref 40–129)
ALT SERPL-CCNC: 17 U/L (ref 10–40)
ANION GAP SERPL CALCULATED.3IONS-SCNC: 12 MMOL/L (ref 3–16)
AST SERPL-CCNC: 17 U/L (ref 15–37)
BASOPHILS # BLD: 0.1 K/UL (ref 0–0.2)
BASOPHILS NFR BLD: 0.7 %
BILIRUB SERPL-MCNC: 0.3 MG/DL (ref 0–1)
BUN SERPL-MCNC: 37 MG/DL (ref 7–20)
CALCIUM SERPL-MCNC: 10.5 MG/DL (ref 8.3–10.6)
CHLORIDE SERPL-SCNC: 99 MMOL/L (ref 99–110)
CHOLEST SERPL-MCNC: 213 MG/DL (ref 0–199)
CO2 SERPL-SCNC: 26 MMOL/L (ref 21–32)
CREAT SERPL-MCNC: 1.7 MG/DL (ref 0.8–1.3)
DEPRECATED RDW RBC AUTO: 13 % (ref 12.4–15.4)
EOSINOPHIL # BLD: 0.9 K/UL (ref 0–0.6)
EOSINOPHIL NFR BLD: 9.2 %
GFR SERPLBLD CREATININE-BSD FMLA CKD-EPI: 42 ML/MIN/{1.73_M2}
GLUCOSE SERPL-MCNC: 117 MG/DL (ref 70–99)
HCT VFR BLD AUTO: 42.5 % (ref 40.5–52.5)
HDLC SERPL-MCNC: 68 MG/DL (ref 40–60)
HGB BLD-MCNC: 14 G/DL (ref 13.5–17.5)
LDLC SERPL CALC-MCNC: 125 MG/DL
LYMPHOCYTES # BLD: 2.1 K/UL (ref 1–5.1)
LYMPHOCYTES NFR BLD: 22.1 %
MCH RBC QN AUTO: 32.3 PG (ref 26–34)
MCHC RBC AUTO-ENTMCNC: 33 G/DL (ref 31–36)
MCV RBC AUTO: 97.8 FL (ref 80–100)
MONOCYTES # BLD: 0.8 K/UL (ref 0–1.3)
MONOCYTES NFR BLD: 8.7 %
NEUTROPHILS # BLD: 5.7 K/UL (ref 1.7–7.7)
NEUTROPHILS NFR BLD: 59.3 %
PLATELET # BLD AUTO: 238 K/UL (ref 135–450)
PMV BLD AUTO: 7.1 FL (ref 5–10.5)
POTASSIUM SERPL-SCNC: 4.7 MMOL/L (ref 3.5–5.1)
PROT SERPL-MCNC: 6.8 G/DL (ref 6.4–8.2)
PSA SERPL DL<=0.01 NG/ML-MCNC: <0.02 NG/ML (ref 0–4)
RBC # BLD AUTO: 4.34 M/UL (ref 4.2–5.9)
SODIUM SERPL-SCNC: 137 MMOL/L (ref 136–145)
TRIGL SERPL-MCNC: 98 MG/DL (ref 0–150)
VLDLC SERPL CALC-MCNC: 20 MG/DL
WBC # BLD AUTO: 9.6 K/UL (ref 4–11)

## 2025-06-30 PROCEDURE — 36415 COLL VENOUS BLD VENIPUNCTURE: CPT

## 2025-06-30 PROCEDURE — 83036 HEMOGLOBIN GLYCOSYLATED A1C: CPT

## 2025-06-30 PROCEDURE — 85025 COMPLETE CBC W/AUTO DIFF WBC: CPT

## 2025-06-30 PROCEDURE — 80053 COMPREHEN METABOLIC PANEL: CPT

## 2025-06-30 PROCEDURE — 84153 ASSAY OF PSA TOTAL: CPT

## 2025-06-30 PROCEDURE — 80061 LIPID PANEL: CPT

## 2025-06-30 PROCEDURE — 84403 ASSAY OF TOTAL TESTOSTERONE: CPT

## 2025-07-01 ENCOUNTER — RESULTS FOLLOW-UP (OUTPATIENT)
Dept: FAMILY MEDICINE CLINIC | Age: 70
End: 2025-07-01

## 2025-07-01 LAB
EST. AVERAGE GLUCOSE BLD GHB EST-MCNC: 119.8 MG/DL
HBA1C MFR BLD: 5.8 %

## 2025-07-02 DIAGNOSIS — N28.9 FUNCTION KIDNEY DECREASED: Primary | ICD-10-CM

## 2025-07-03 LAB — TESTOST SERPL-MCNC: 4 NG/DL (ref 193–740)

## 2025-07-21 ENCOUNTER — HOSPITAL ENCOUNTER (OUTPATIENT)
Dept: LAB | Age: 70
Discharge: HOME | End: 2025-07-21
Payer: MEDICARE

## 2025-07-21 ENCOUNTER — PATIENT MESSAGE (OUTPATIENT)
Dept: FAMILY MEDICINE CLINIC | Age: 70
End: 2025-07-21

## 2025-07-21 DIAGNOSIS — C61 PROSTATE CANCER (HCC): ICD-10-CM

## 2025-07-21 DIAGNOSIS — N28.9 RENAL FUNCTION IMPAIRMENT: ICD-10-CM

## 2025-07-21 DIAGNOSIS — N28.9 FUNCTION KIDNEY DECREASED: ICD-10-CM

## 2025-07-21 LAB
ANION GAP SERPL CALCULATED.3IONS-SCNC: 13 MMOL/L (ref 3–16)
BUN SERPL-MCNC: 30 MG/DL (ref 7–20)
CALCIUM SERPL-MCNC: 10.3 MG/DL (ref 8.3–10.6)
CHLORIDE SERPL-SCNC: 102 MMOL/L (ref 99–110)
CO2 SERPL-SCNC: 22 MMOL/L (ref 21–32)
CREAT SERPL-MCNC: 1.8 MG/DL (ref 0.8–1.3)
GFR SERPLBLD CREATININE-BSD FMLA CKD-EPI: 39 ML/MIN/{1.73_M2}
GLUCOSE SERPL-MCNC: 97 MG/DL (ref 70–99)
POTASSIUM SERPL-SCNC: 4.6 MMOL/L (ref 3.5–5.1)
SODIUM SERPL-SCNC: 137 MMOL/L (ref 136–145)

## 2025-07-21 PROCEDURE — 80048 BASIC METABOLIC PNL TOTAL CA: CPT

## 2025-07-21 PROCEDURE — 36415 COLL VENOUS BLD VENIPUNCTURE: CPT

## 2025-07-22 RX ORDER — AMLODIPINE BESYLATE 5 MG/1
5 TABLET ORAL DAILY
Qty: 90 TABLET | Refills: 1 | Status: SHIPPED | OUTPATIENT
Start: 2025-07-22

## 2025-07-24 ENCOUNTER — HOSPITAL ENCOUNTER (OUTPATIENT)
Dept: ULTRASOUND IMAGING | Age: 70
Discharge: HOME | End: 2025-07-24
Attending: FAMILY MEDICINE
Payer: MEDICARE

## 2025-07-24 DIAGNOSIS — N28.9 RENAL FUNCTION IMPAIRMENT: ICD-10-CM

## 2025-07-24 PROCEDURE — 76770 US EXAM ABDO BACK WALL COMP: CPT

## 2025-08-06 ENCOUNTER — TRANSCRIBE ORDERS (OUTPATIENT)
Dept: ADMINISTRATIVE | Age: 70
End: 2025-08-06

## 2025-08-06 DIAGNOSIS — N20.0 CALCULUS OF KIDNEY: Primary | ICD-10-CM

## 2025-08-08 ENCOUNTER — HOSPITAL ENCOUNTER (OUTPATIENT)
Dept: CT IMAGING | Age: 70
Discharge: HOME | End: 2025-08-08
Attending: UROLOGY
Payer: MEDICARE

## 2025-08-08 DIAGNOSIS — N20.0 CALCULUS OF KIDNEY: ICD-10-CM

## 2025-08-08 PROCEDURE — 74176 CT ABD & PELVIS W/O CONTRAST: CPT

## 2025-08-31 ENCOUNTER — HOSPITAL ENCOUNTER (OUTPATIENT)
Dept: LAB | Age: 70
Discharge: HOME | End: 2025-08-31
Payer: MEDICARE

## 2025-08-31 LAB
ALBUMIN SERPL-MCNC: 3.9 G/DL (ref 3.4–5)
ANION GAP SERPL CALCULATED.3IONS-SCNC: 11 MMOL/L (ref 3–16)
BILIRUB UR QL STRIP.AUTO: NEGATIVE
BUN SERPL-MCNC: 21 MG/DL (ref 7–20)
CALCIUM SERPL-MCNC: 9.7 MG/DL (ref 8.3–10.6)
CHARACTER UR: ABNORMAL
CHLORIDE SERPL-SCNC: 103 MMOL/L (ref 99–110)
CLARITY UR: ABNORMAL
CO2 SERPL-SCNC: 23 MMOL/L (ref 21–32)
COLOR UR: YELLOW
CREAT SERPL-MCNC: 1.2 MG/DL (ref 0.8–1.3)
DEPRECATED RDW RBC AUTO: 12.9 % (ref 12.4–15.4)
GFR SERPLBLD CREATININE-BSD FMLA CKD-EPI: 64 ML/MIN/{1.73_M2}
GLUCOSE SERPL-MCNC: 110 MG/DL (ref 70–99)
GLUCOSE UR STRIP.AUTO-MCNC: NEGATIVE MG/DL
HCT VFR BLD AUTO: 39.8 % (ref 40.5–52.5)
HGB BLD-MCNC: 13.7 G/DL (ref 13.5–17.5)
HGB UR QL STRIP.AUTO: ABNORMAL
KETONES UR STRIP.AUTO-MCNC: NEGATIVE MG/DL
LEUKOCYTE ESTERASE UR QL STRIP.AUTO: ABNORMAL
MAGNESIUM SERPL-MCNC: 2.1 MG/DL (ref 1.8–2.4)
MCH RBC QN AUTO: 32.5 PG (ref 26–34)
MCHC RBC AUTO-ENTMCNC: 34.5 G/DL (ref 31–36)
MCV RBC AUTO: 94.1 FL (ref 80–100)
NITRITE UR QL STRIP.AUTO: NEGATIVE
PH UR STRIP.AUTO: 6.5 [PH] (ref 5–8)
PHOSPHATE SERPL-MCNC: 2.9 MG/DL (ref 2.5–4.9)
PLATELET # BLD AUTO: 207 K/UL (ref 135–450)
PMV BLD AUTO: 7.1 FL (ref 5–10.5)
POTASSIUM SERPL-SCNC: 4.1 MMOL/L (ref 3.5–5.1)
PROT UR STRIP.AUTO-MCNC: 30 MG/DL
RBC # BLD AUTO: 4.22 M/UL (ref 4.2–5.9)
RBC #/AREA URNS HPF: ABNORMAL /HPF (ref 0–4)
SODIUM SERPL-SCNC: 137 MMOL/L (ref 136–145)
SP GR UR STRIP.AUTO: 1.01 (ref 1–1.03)
UA DIPSTICK W REFLEX MICRO PNL UR: YES
URATE SERPL-MCNC: 5.5 MG/DL (ref 3.5–7.2)
URN SPEC COLLECT METH UR: ABNORMAL
UROBILINOGEN UR STRIP-ACNC: 0.2 E.U./DL
WBC # BLD AUTO: 8.9 K/UL (ref 4–11)
WBC #/AREA URNS HPF: >100 /HPF (ref 0–5)

## 2025-08-31 PROCEDURE — 85027 COMPLETE CBC AUTOMATED: CPT

## 2025-08-31 PROCEDURE — 36415 COLL VENOUS BLD VENIPUNCTURE: CPT

## 2025-08-31 PROCEDURE — 82570 ASSAY OF URINE CREATININE: CPT

## 2025-08-31 PROCEDURE — 82043 UR ALBUMIN QUANTITATIVE: CPT

## 2025-08-31 PROCEDURE — 81001 URINALYSIS AUTO W/SCOPE: CPT

## 2025-08-31 PROCEDURE — 84550 ASSAY OF BLOOD/URIC ACID: CPT

## 2025-08-31 PROCEDURE — 83735 ASSAY OF MAGNESIUM: CPT

## 2025-08-31 PROCEDURE — 83970 ASSAY OF PARATHORMONE: CPT

## 2025-08-31 PROCEDURE — 82306 VITAMIN D 25 HYDROXY: CPT

## 2025-08-31 PROCEDURE — 84156 ASSAY OF PROTEIN URINE: CPT

## 2025-08-31 PROCEDURE — 80069 RENAL FUNCTION PANEL: CPT

## 2025-09-01 LAB
25(OH)D3 SERPL-MCNC: 117 NG/ML
CREAT UR-MCNC: 62 MG/DL (ref 39–259)
MICROALBUMIN UR DL<=1MG/L-MCNC: 10.1 MG/DL
MICROALBUMIN/CREAT UR: 162.9 MG/G (ref 0–30)
PROT UR-MCNC: 76.2 MG/DL
PROT/CREAT UR-RTO: 1.2 MG/DL
PTH-INTACT SERPL-MCNC: 26.2 PG/ML (ref 14–72)

## (undated) DEVICE — NEEDLE HYPO 18GA L1.5IN PNK POLYPR HUB S STL THN WALL FILL

## (undated) DEVICE — CHLORAPREP 26ML ORANGE

## (undated) DEVICE — UNIVERSAL BLOCK TRAY: Brand: MEDLINE INDUSTRIES, INC.

## (undated) DEVICE — ALCOHOL RUBBING 16OZ 70% ISO

## (undated) DEVICE — TOWEL OR BLUEE 16X26IN ST 8 PACK ORB08 16X26ORTWL

## (undated) DEVICE — GAUZE,SPONGE,4"X4",16PLY,STRL,LF,10/TRAY: Brand: MEDLINE

## (undated) DEVICE — AVANOS* UNIVERSAL BLOCK TRAYS: Brand: AVANOS

## (undated) DEVICE — STERILE POLYISOPRENE POWDER-FREE SURGICAL GLOVES: Brand: PROTEXIS